# Patient Record
Sex: FEMALE | Race: WHITE | NOT HISPANIC OR LATINO | Employment: FULL TIME | URBAN - METROPOLITAN AREA
[De-identification: names, ages, dates, MRNs, and addresses within clinical notes are randomized per-mention and may not be internally consistent; named-entity substitution may affect disease eponyms.]

---

## 2021-03-24 LAB
EXTERNAL ABO GROUPING: NORMAL
EXTERNAL ANTIBODY SCREEN: NORMAL
EXTERNAL HEMATOCRIT: 38.2 %
EXTERNAL HEMOGLOBIN: 12.8 G/DL
EXTERNAL HEPATITIS B SURFACE ANTIGEN: NEGATIVE
EXTERNAL HIV-1/2 AB-AG: NORMAL
EXTERNAL PLATELET COUNT: 271 K/ΜL
EXTERNAL RH FACTOR: POSITIVE
EXTERNAL RUBELLA IGG QUANTITATION: NORMAL
EXTERNAL SYPHILIS RPR SCREEN: NORMAL

## 2021-05-24 LAB — EXTERNAL AFP: NORMAL

## 2021-07-22 LAB
EXTERNAL HEMATOCRIT: 34.4 %
EXTERNAL HEMOGLOBIN: 11.2 G/DL
EXTERNAL PLATELET COUNT: 212 K/ΜL
EXTERNAL SYPHILIS RPR SCREEN: NORMAL

## 2021-08-27 ENCOUNTER — INITIAL PRENATAL (OUTPATIENT)
Dept: OBGYN CLINIC | Facility: CLINIC | Age: 21
End: 2021-08-27
Payer: COMMERCIAL

## 2021-08-27 ENCOUNTER — TELEPHONE (OUTPATIENT)
Dept: OBGYN CLINIC | Facility: CLINIC | Age: 21
End: 2021-08-27

## 2021-08-27 VITALS — SYSTOLIC BLOOD PRESSURE: 125 MMHG | DIASTOLIC BLOOD PRESSURE: 70 MMHG | WEIGHT: 198 LBS

## 2021-08-27 DIAGNOSIS — Z34.03 ENCOUNTER FOR SUPERVISION OF NORMAL FIRST PREGNANCY IN THIRD TRIMESTER: ICD-10-CM

## 2021-08-27 DIAGNOSIS — O99.210 MATERNAL OBESITY, ANTEPARTUM: ICD-10-CM

## 2021-08-27 DIAGNOSIS — Z34.03 ENCOUNTER FOR SUPERVISION OF NORMAL FIRST PREGNANCY IN THIRD TRIMESTER: Primary | ICD-10-CM

## 2021-08-27 DIAGNOSIS — Z3A.33 33 WEEKS GESTATION OF PREGNANCY: ICD-10-CM

## 2021-08-27 DIAGNOSIS — U07.1 COVID-19 AFFECTING PREGNANCY IN SECOND TRIMESTER: Primary | ICD-10-CM

## 2021-08-27 DIAGNOSIS — O98.512 COVID-19 AFFECTING PREGNANCY IN SECOND TRIMESTER: ICD-10-CM

## 2021-08-27 DIAGNOSIS — U07.1 COVID-19 AFFECTING PREGNANCY IN SECOND TRIMESTER: ICD-10-CM

## 2021-08-27 DIAGNOSIS — O98.512 COVID-19 AFFECTING PREGNANCY IN SECOND TRIMESTER: Primary | ICD-10-CM

## 2021-08-27 PROBLEM — Z34.00 SUPERVISION OF NORMAL FIRST PREGNANCY: Status: ACTIVE | Noted: 2021-08-27

## 2021-08-27 LAB
SL AMB  POCT GLUCOSE, UA: NEGATIVE
SL AMB POCT URINE PROTEIN: NEGATIVE

## 2021-08-27 PROCEDURE — OBC: Performed by: CLINICAL NURSE SPECIALIST

## 2021-08-27 PROCEDURE — PNV: Performed by: CLINICAL NURSE SPECIALIST

## 2021-08-27 PROCEDURE — 81002 URINALYSIS NONAUTO W/O SCOPE: CPT | Performed by: CLINICAL NURSE SPECIALIST

## 2021-08-27 NOTE — PROGRESS NOTES
Prenatal Visit  Subjective:   Anibal Moreno is a 23 y o  female  She is a No obstetric history on file  here for initial PN visit/New OB exam  New ob transfer  Up to date on prenatal care  Records in care everywhere    She is reporting the following pregnancy related complaints:   Mild b/l lower abd/pelvic discomfort  Reports related to babies position and comes and goes   Denies ctx other than occasional BH type ctx   Denies LOF, VB or unusual d/c   Reports very active fetus    Just completed OB intake today- see other visit same day  Past history review including family genetic history reveal the following risk factors:  ·  COVID-19 affecting pregnancy in second trimester    ·  Maternal obesity, antepartum         Objective:  Patient's last menstrual period was 01/10/2021  LMP 01/10/2021   Pregravid Weight/BMI: 77 1 kg (170 lb) (BMI 30 12)      OBGyn Exam- see prenatal physical form    Assessment & Plan:       1  Encounter for supervision of normal first pregnancy in third trimester  Assessment & Plan:  She is a  who is 32w5d   Transfer of care due to relocation  We are able to see most prenatal records in Care everywhere and she is up to date    She completed both her initial prenatal blood work as well as her 3rd trimester blood work  Opted for alternate glucose testing for gestational diabetes by doing 2 weeks of glucose monitoring through [address]  Those records were requested and are not in the chart    She declined optional genetic screening but did have AFP which was normal  Level 2 ultrasound normal   Does not know gender is planning surprise for delivery   she declined flu vaccine as well as Tdap vaccine   pregnancy has been complicated by COVID infection in the 2nd trimester when she was 16 weeks pregnant    She has recovered and denies any symptoms    A follow-up growth scan was ordered through our Maternal-Fetal Medicine Department fundal height is measuring slightly low at 31 cm and she is 32 weeks 5 days  She was oriented to practice and reviewed the expected course of the remainder of the pregnancy  She is planning to breastfeed      labor and preeclampsia precautions were reviewed and educational handout given -see AVS     return to office in 2 weeks for routine prenatal visit        2  COVID-19 affecting pregnancy in second trimester  Assessment & Plan:  +Covid hx around 16 wks  Recovered   Denies lingering sxs  Growth US ordered      3  Maternal obesity, antepartum  Assessment & Plan:   Pre gestational BMI is 30  Was not started on low-dose aspirin by previous OB gyn and at this point would not be recommended since  She is > 28 weeks  Normal early hemoglobin A1c  she declined Glucola  In 3rd trimester and opted for alternate testing with 2 weeks of blood glucose monitoring through [facility]  We do not have those records as of yet and they were requested today  Patient reports testing within normal and not diagnosed with gestational diabetes      4  33 weeks gestation of pregnancy  -     POCT urine dip  -     Ambulatory Referral to Maternal Fetal Medicine;  Future; Expected date: 2021        CODI Louise  2021

## 2021-08-27 NOTE — ASSESSMENT & PLAN NOTE
She is a  who is 32w5d   Transfer of care due to relocation  We are able to see most prenatal records in Care everywhere and she is up to date    She completed both her initial prenatal blood work as well as her 3rd trimester blood work  Opted for alternate glucose testing for gestational diabetes by doing 2 weeks of glucose monitoring through [address]  Those records were requested and are not in the chart    She declined optional genetic screening but did have AFP which was normal  Level 2 ultrasound normal   Does not know gender is planning surprise for delivery   she declined flu vaccine as well as Tdap vaccine   pregnancy has been complicated by COVID infection in the 2nd trimester when she was 16 weeks pregnant  She has recovered and denies any symptoms    A follow-up growth scan was ordered through our Maternal-Fetal Medicine Department fundal height is measuring slightly low at 31 cm and she is 32 weeks 5 days  She was oriented to practice and reviewed the expected course of the remainder of the pregnancy       She is planning to breastfeed      labor and preeclampsia precautions were reviewed and educational handout given -see AVS     return to office in 2 weeks for routine prenatal visit

## 2021-08-27 NOTE — ASSESSMENT & PLAN NOTE
Pre gestational BMI is 30  Was not started on low-dose aspirin by previous OB gyn and at this point would not be recommended since  She is > 28 weeks  Normal early hemoglobin A1c  she declined Glucola  In 3rd trimester and opted for alternate testing with 2 weeks of blood glucose monitoring through [facility]  We do not have those records as of yet and they were requested today    Patient reports testing within normal and not diagnosed with gestational diabetes

## 2021-08-27 NOTE — PROGRESS NOTES
Subjective  Patient ID: Shelle Halsted is a 23 y o  female here for OB intake  She is accompanied by: self  Transfer to practice  Previous prenatal care at [facility]  Pregnancy was unplanned/surpised  Does not know Gender    Her Patient's last menstrual period was 01/10/2021  giving her an MARQUEZ of 10/17/21  She reports her menstrual cycle is regular    She is up to date on her Prenatal care  PNBW up to date  Declined optional genetic screening  MSAFP nml  Did alternate GDM testing- nml per pt - records requested  nml NT and Level 2 US  Declined Flu and tdap    Obstetric history reviewed/updated:  OB History    Para Term  AB Living   1 0 0 0 0 0   SAB TAB Ectopic Multiple Live Births   0 0 0 0 0      # Outcome Date GA Lbr Parth/2nd Weight Sex Delivery Anes PTL Lv   1 Current                 Previous Pregnancy Complications/Hx: n/a- G1    The following portions of the patient's history were reviewed and updated as appropriate: allergies, current medications, past family history, past medical history, past social history, past surgical history, and problem list     Denies personal hx of thyroid disorder, DM/Pre-diabetes, PCOS, metformin use, CHTN  Negative family hx of DM (T1 or T2), Pre-eclampsia/eclampsia, thyroid disorder  Family genetic history completed: unremarkable     Pre-Pregnancy weight: 77 1 kg (170 lb)  Pre-gravid BMI: 30 12  Infection/Exposure Risk assessment  Employed: Yes  Occupation: graduate admissions counselors    Infection Screening    Prior hx of MRSA?  no  Recent COVID Infection or exposure:   Yes Covid at 16 wks       Immunizations:   Vaccinated against Hep B: yes   Previous VZV vaccine or chicken pox disease   Yes + vaccine   influenza vaccine given this flu season: no    Discussed Tdap vaccine administration at 27-28 weeks  o Declines    Substance and Domestic Abuse Screening  None             Depression screening     PHQ-A Depression Screening ____________________________________________             /70   Wt 89 8 kg (198 lb)   LMP 01/10/2021   PE N/A-see other visit same day          Assessment/Plan:     OB intake completed  She is a  at [address]  Transfer in the 3rd trimester  She is up to date on her Prenatal care  · PNBW up to date  · Declined optional genetic screening  · MSAFP nml  · Did alternate GDM testing- nml per pt - records requested  · nml NT and Level 2 US  · Declined Flu and tdap    Oriented to practice and on call schedule  I reviewed risk factors for pregnancy based on her medical history     Diabetes risk Factors: The following hx was assessed r/t risk for diabetes in pregnancy: Pregestational DM, hx of GDM, BMI >35, 1st degree relative w/ T2 DM, personal hx of PCOS, Metformin use, Prior baby LGA/macrosomia  Pertinent positive: obesity     Hypertension  The following hx was assessed r/t risk for HTN disorder in pregnancy:   (Risk level High) prior hx of CHTN, gHTN, Pre-eclampsia (or eclampsia or HELLP syndrome), FH  pre-eclampsia, Multifetal gestation, Type 1 or Type 2 DM, renal disease, Autoimmune disease, Nulliparity;   (Risk level Mod) BMI > 30, > age 28, > 10 yr pregnancy interval, Previous IUGR/SGA baby, race  Pertinent positive: obesity & nulliparity    Prenatal lab work reviewed    Pregnancy Education  [facility]  [place] P[facility] Ess[facility] Book reviewed and discussed  Call group and instructions to call the office/answering service in case of an emergency  Discussed appropriate weight gain in pregnancy based on pre-gravid BMI    Discussed healthy lifestyle choices for pregnancy     OB packet/Handouts given addressing   Nutrition, Immunizations, and Medications during pregnancy   Warning Signs During Pregnancy   Baby and Me phone [facility]  [address] [place] [address] and Parenting Classes   [address] and Ultrasounds in Pregnancy    CoronaVirus and [place] precautions discussed and encouraged patient to visit CDC website for up-to-date information  Warning signs reviewed as well as reasons to call

## 2021-08-27 NOTE — TELEPHONE ENCOUNTER
Fax send to [facility] maternal fetal medicine #[phone] for records of glucose monitoring  Phone #[phone]

## 2021-08-27 NOTE — PATIENT INSTRUCTIONS
LABOR PRECAUTIONS   Call if having > 4-6 contractions in an hour (average occurring every 10-15 minutes)   Feeling of significant pelvic, lower abdomen, or vaginal pressure   Lower back pain that comes and goes   Any vaginal bleeding or spotting   Watery or bloody vaginal discharge       Labor   WHAT YOU NEED TO KNOW:   What is  labor?  (premature) labor occurs when the uterus contracts and your cervix opens earlier than normal  The cervix is the opening of your uterus   labor happens after the 20th week of pregnancy but before the 37th week  You may have premature rupture of membranes (PROM)  PROM means your water broke before labor began  An early labor could cause you to have your baby before he or she is ready to be born  What causes  labor? The cause is sometimes unknown  The following may cause early labor:  · A large uterus or a short cervix  can cause you to go into labor early  · A chronic illness , such as high blood pressure, diabetes, or obesity, can cause early labor  · An infection , such as a urinary tract infection or vaginal infection, can weaken the membranes (linings) of the amniotic sac around your baby  This could lead to premature rupture of the membranes and  labor  · Problems with the placenta , such as placenta previa or placental separation, may cause  labor  · Injury to your abdomen or uterus  may also cause some cases of  labor  What increases my risk for  labor? · You are pregnant with 2 or more babies  · You are under 16 or over 28years of age  · You get pregnant again less than 6 months after delivery  · You had a  labor or  birth in the past   · You did not have prenatal care  · You smoke, drink alcohol, or use street drugs while pregnant  · You are underweight  Too little weight gain during pregnancy may also increase your risk for early labor      What are the signs and symptoms of  labor? You may not know that you are having  labor  It is common to have  contractions (tightening and relaxing of the uterus) and not notice them  The following are signs and symptoms that suggest a  labor:  · Contractions that get stronger and closer together  · Changes in vaginal discharge, such as more discharge or discharge that is watery or bloody   · Low back pain   · Pressure in the lower abdomen   · Vaginal spotting or bleeding    How is  labor treated? Early treatment may delay delivery  You may need any of the following:  · Bed rest  may be recommended  You may need to lie on your left side, which improves circulation to your uterus and baby  Your healthcare provider will tell you when it is okay to get out of bed  · Medicine  may be given to stop contractions if your baby is not ready to be born  You may also need certain medicines if your  labor cannot be stopped and your healthcare provider thinks you will have your baby early  These medicines help your baby's lungs, brain, and digestive organs mature  They also help decrease your baby's risk of being born with cerebral palsy  If you have PROM, fluid from your vagina or rectum will be checked for a strep infection  You may be given antibiotics to prevent a strep infection during delivery  Antibiotics may also be used to prevent labor from starting  You may also need steroids to decrease the risk for complications due to  labor  Call your local emergency number (911 in the [address] [place] [address],[address]) if:   · You see or feel like there is something in your vagina  When should I call my doctor? · You have bright red, painless vaginal bleeding  · Your symptoms do not get better or they get worse  · Your water broke or you feel warm water gushing or trickling from your vagina  · You have contractions that get stronger and closer together for more than 1 hour     · You notice a decrease in your baby's movement  · You have abdominal cramps, pressure, or tightening  · You have a change in vaginal discharge  · You have a fever  · You have burning when you urinate or you are urinating less than is normal for you  · You have questions or concerns about your condition or care  CARE AGREEMENT:   You have the right to help plan your care  Learn about your health condition and how it may be treated  Discuss treatment options with your healthcare providers to decide what care you want to receive  You always have the right to refuse treatment  The above information is an  only  It is not intended as medical advice for individual conditions or treatments  Talk to your doctor, nurse or pharmacist before following any medical regimen to see if it is safe and effective for you  © Copyright [address] Information is for End User's use only and may not be sold, redistributed or otherwise used for commercial purposes  All illustrations and images included in CareNotes® are the copyrighted property of [employer] D A M [employer] Inc  or [employer]     Pt currently denies HA, vision changes or RUQ pain  No significant swelling noted  BP:  Urine : Wt:     Signs and symptoms of Pre-eclampsia - a disorder in pregnancy involving elevated Blood pressure in pregnancy  Please call immediately with any of the following:   Severe headache that does not improve with tylenol/rest/increased fluids   Vision disturances such as blurry vision, white spots or flashing lights in vision   Abdominal pain in the right upper quadrant- unrelated to fetal movement   Weight gain > 2-3 pounds in one week   Severe swelling- particularly of the hands or face

## 2021-09-01 ENCOUNTER — OB ABSTRACT (OUTPATIENT)
Dept: OBGYN CLINIC | Facility: CLINIC | Age: 21
End: 2021-09-01

## 2021-09-10 ENCOUNTER — ROUTINE PRENATAL (OUTPATIENT)
Dept: OBGYN CLINIC | Facility: CLINIC | Age: 21
End: 2021-09-10

## 2021-09-10 VITALS — SYSTOLIC BLOOD PRESSURE: 116 MMHG | DIASTOLIC BLOOD PRESSURE: 80 MMHG | WEIGHT: 201.4 LBS | HEART RATE: 81 BPM

## 2021-09-10 DIAGNOSIS — Z34.03 ENCOUNTER FOR SUPERVISION OF NORMAL FIRST PREGNANCY IN THIRD TRIMESTER: ICD-10-CM

## 2021-09-10 DIAGNOSIS — O99.213 OBESITY AFFECTING PREGNANCY, ANTEPARTUM, THIRD TRIMESTER: Primary | ICD-10-CM

## 2021-09-10 DIAGNOSIS — Z3A.34 34 WEEKS GESTATION OF PREGNANCY: ICD-10-CM

## 2021-09-10 LAB
SL AMB  POCT GLUCOSE, UA: ABNORMAL
SL AMB POCT URINE PROTEIN: ABNORMAL

## 2021-09-10 PROCEDURE — PNV: Performed by: OBSTETRICS & GYNECOLOGY

## 2021-09-10 NOTE — PROGRESS NOTES
S: 23 y o   34w5d here for routine prenatal visit  Chief Complaint   Patient presents with    Routine Prenatal Visit     patient c/o lower abdominal pain, patient denies any vaginal bleeding,LOF         OB complaints:  Contractions: no  Leakage: no  Bleeding: no  Fetal movement: yes  Some lower pelvic pressure/pain with certain movements and  position changes  Denies cramping/CTX type pain  O:  /80 (BP Location: Left arm, Patient Position: Sitting, Cuff Size: Standard)   Pulse 81   Wt 91 4 kg (201 lb 6 4 oz)   LMP 01/10/2021       Review of Systems   Constitutional: Negative for chills and fever  Eyes: Negative for visual disturbance  Respiratory: Negative for chest tightness and shortness of breath  Cardiovascular: Negative for chest pain  Gastrointestinal: Negative for abdominal pain, diarrhea, nausea and vomiting  Genitourinary: Positive for pelvic pain  Negative for vaginal bleeding  As noted in HPI   Skin: Negative for rash  Neurological: Negative for headaches  All other systems reviewed and are negative  Physical Exam  Constitutional:       General: She is not in acute distress  Appearance: Normal appearance  HENT:      Head: Normocephalic and atraumatic  Cardiovascular:      Rate and Rhythm: Normal rate  Pulmonary:      Effort: Pulmonary effort is normal  No respiratory distress  Abdominal:      General: There is no distension  Palpations: Abdomen is soft  Tenderness: There is no abdominal tenderness  There is no guarding or rebound  Comments: gravid   Neurological:      General: No focal deficit present  Mental Status: She is alert  Psychiatric:         Mood and Affect: Mood normal          Behavior: Behavior normal    Vitals and nursing note reviewed             Fundal Height (cm): 34 cm  Fetal Heart Rate: 140          Problem List Items Addressed This Visit        Other    Encounter for supervision of normal first pregnancy in third trimester     Normal discomforts of pregnancy reviewed  Labor consents and patient education materials provided today, can return next week   Again declines flu and tdap vaccines  GBS next visit   OB, COVID precautions reviewed         Obesity affecting pregnancy, antepartum, third trimester - Primary     Growth US scheduled for next week          34 weeks gestation of pregnancy    Relevant Orders    POCT urine dip (Completed)                          Chacho Hylton MD  9/10/2021  8:27 AM

## 2021-09-10 NOTE — PATIENT INSTRUCTIONS
Pregnancy at 28 to 38 Weeks   AMBULATORY CARE:   What changes are happening to your body: You are considered full term at the beginning of 37 weeks  Your breathing may be easier if your baby has moved down into a head-down position  You may need to urinate more often because the baby may be pressing on your bladder  You may also feel more discomfort and get tired easily  Seek care immediately if:   · You develop a severe headache that does not go away  · You have new or increased vision changes, such as blurred or spotted vision  · You have new or increased swelling in your face or hands  · You have vaginal spotting or bleeding  · Your water broke or you feel warm water gushing or trickling from your vagina  Contact your healthcare provider if:   · You have more than 5 contractions in 1 hour  · You notice any changes in your baby's movements  · You have abdominal cramps, pressure, or tightening  · You have a change in vaginal discharge  · You have chills or a fever  · You have vaginal itching, burning, or pain  · You have yellow, green, white, or foul-smelling vaginal discharge  · You have pain or burning when you urinate, less urine than usual, or pink or bloody urine  · You have questions or concerns about your condition or care  How to care for yourself at this stage of your pregnancy:   · Eat a variety of healthy foods  Healthy foods include fruits, vegetables, whole-grain breads, low-fat dairy foods, beans, lean meats, and fish  Drink liquids as directed  Ask how much liquid to drink each day and which liquids are best for you  Limit caffeine to less than 200 milligrams each day  Limit your intake of fish to 2 servings each week  Choose fish low in mercury such as canned light tuna, shrimp, salmon, cod, or tilapia  Do not  eat fish high in mercury such as swordfish, tilefish, lyndsay mackerel, and shark  · Take prenatal vitamins as directed    Your need for certain vitamins and minerals, such as folic acid, increases during pregnancy  Prenatal vitamins provide some of the extra vitamins and minerals you need  Prenatal vitamins may also help to decrease the risk of certain birth defects  · Rest as needed  Put your feet up if you have swelling in your ankles and feet  · Do not smoke  Smoking increases your risk of a miscarriage and other health problems during your pregnancy  Smoking can cause your baby to be born early or weigh less at birth  Ask your healthcare provider for information if you need help quitting  · Do not drink alcohol  Alcohol passes from your body to your baby through the placenta  It can affect your baby's brain development and cause fetal alcohol syndrome (FAS)  FAS is a group of conditions that causes mental, behavior, and growth problems  · Talk to your healthcare provider before you take any medicines  Many medicines may harm your baby if you take them when you are pregnant  Do not take any medicines, vitamins, herbs, or supplements without first talking to your healthcare provider  Never use illegal or street drugs (such as marijuana or cocaine) while you are pregnant  · Talk to your healthcare provider before you travel  You may not be able to travel in an airplane after 36 weeks  He may also recommend that you avoid long road trips  Safety tips during pregnancy:   · Avoid hot tubs and saunas  Do not use a hot tub or sauna while you are pregnant, especially during your first trimester  Hot tubs and saunas may raise your baby's temperature and increase the risk of birth defects  · Avoid toxoplasmosis  This is an infection caused by eating raw meat or being around infected cat feces  It can cause birth defects, miscarriages, and other problems  Wash your hands after you touch raw meat  Make sure any meat is well-cooked before you eat it  Avoid raw eggs and unpasteurized milk   Use gloves or ask someone else to clean your cat's litter box while you are pregnant  · Ask your healthcare provider about travel  The most comfortable time to travel is during the second trimester  Ask your healthcare provider if you can travel after 36 weeks  You may not be able to travel in an airplane after 36 weeks  He may also recommend that you avoid long road trips  Changes that are happening with your baby:  By 38 weeks, your baby may weigh between 6 and 9 pounds  Your baby may be about 14 inches long from the top of the head to the rump (baby's bottom)  Your baby hears well enough to know your voice  As your baby gets larger, you may feel fewer kicks and more stretching and rolling  Your baby may move into a head-down position  Your baby will also rest lower in your abdomen  What you need to know about prenatal care: Your healthcare provider will check your blood pressure and weight  You may also need the following:  · A urine test  may also be done to check for sugar and protein  These can be signs of gestational diabetes or infection  Protein in your urine may also be a sign of preeclampsia  Preeclampsia is a condition that can develop during week 20 or later of your pregnancy  It causes high blood pressure, and it can cause problems with your kidneys and other organs  · A blood test  may be done to check for anemia (low iron level)  · A Tdap vaccine  may be recommended by your healthcare provider  · A group B strep test  is a test that is done to check for group B strep infection  Group B strep is a type of bacteria that may be found in the vagina or rectum  It can be passed to your baby during delivery if you have it  Your healthcare provider will take swab your vagina or rectum and send the sample to the lab for tests  · Fundal height  is a measurement of your uterus to check your baby's growth  This number is usually the same as the number of weeks that you have been pregnant   Your healthcare provider may also check your baby's position  · Your baby's heart rate  will be checked  © Copyright [employer] 2021 Information is for End User's use only and may not be sold, redistributed or otherwise used for commercial purposes  All illustrations and images included in CareNotes® are the copyrighted property of A D A M , Inc  or Ketan Francois  The above information is an  only  It is not intended as medical advice for individual conditions or treatments  Talk to your doctor, nurse or pharmacist before following any medical regimen to see if it is safe and effective for you

## 2021-09-10 NOTE — ASSESSMENT & PLAN NOTE
Normal discomforts of pregnancy reviewed  Labor consents and patient education materials provided today, can return next week   Again declines flu and tdap vaccines  GBS next visit   OB, COVID precautions reviewed

## 2021-09-15 NOTE — PROGRESS NOTES
Please refer to the [facility] ultrasound report in Ob Procedures for additional information regarding today's visit

## 2021-09-16 ENCOUNTER — ROUTINE PRENATAL (OUTPATIENT)
Dept: PERINATAL CARE | Facility: OTHER | Age: 21
End: 2021-09-16
Payer: COMMERCIAL

## 2021-09-16 VITALS
HEIGHT: 63 IN | SYSTOLIC BLOOD PRESSURE: 122 MMHG | BODY MASS INDEX: 35.86 KG/M2 | WEIGHT: 202.4 LBS | HEART RATE: 105 BPM | DIASTOLIC BLOOD PRESSURE: 84 MMHG

## 2021-09-16 DIAGNOSIS — Z3A.33 33 WEEKS GESTATION OF PREGNANCY: ICD-10-CM

## 2021-09-16 DIAGNOSIS — Z36.3 ENCOUNTER FOR ANTENATAL SCREENING FOR MALFORMATIONS: ICD-10-CM

## 2021-09-16 DIAGNOSIS — Z3A.35 35 WEEKS GESTATION OF PREGNANCY: ICD-10-CM

## 2021-09-16 DIAGNOSIS — O43.193 MARGINAL INSERTION OF UMBILICAL CORD AFFECTING MANAGEMENT OF MOTHER IN THIRD TRIMESTER: ICD-10-CM

## 2021-09-16 DIAGNOSIS — O36.5930 INTRAUTERINE GROWTH RESTRICTION AFFECTING ANTEPARTUM CARE OF MOTHER IN THIRD TRIMESTER, SINGLE OR UNSPECIFIED FETUS: Primary | ICD-10-CM

## 2021-09-16 PROCEDURE — 76820 UMBILICAL ARTERY ECHO: CPT | Performed by: OBSTETRICS & GYNECOLOGY

## 2021-09-16 PROCEDURE — 59025 FETAL NON-STRESS TEST: CPT | Performed by: OBSTETRICS & GYNECOLOGY

## 2021-09-16 PROCEDURE — 99203 OFFICE O/P NEW LOW 30 MIN: CPT | Performed by: OBSTETRICS & GYNECOLOGY

## 2021-09-16 PROCEDURE — 76811 OB US DETAILED SNGL FETUS: CPT | Performed by: OBSTETRICS & GYNECOLOGY

## 2021-09-16 NOTE — LETTER
September 16, 2021     Refugio Carbajal, [address] [place] [id number] [facility]    Patient: Marilyn Bui   YOB: 2000   Date of Visit: 9/16/2021       Dear Dr Nydia Watson: Thank you for referring Marilyn Bui to me for evaluation  Below are my notes for this consultation  If you have questions, please do not hesitate to call me  I look forward to following your patient along with you  Sincerely,        Alexa Solis MD        CC: No Recipients  Alexa Solis MD  9/15/2021  8:10 AM  Sign when Signing Visit   Please refer to the [facility] ultrasound report in Ob Procedures for additional information regarding today's visit

## 2021-09-16 NOTE — PROGRESS NOTES
NST procedure and expected outcome explained to patient  Daily fetal kick count discussed with handout given  Patient verbalized understanding of all and was receptive      Nagi Pineda RN

## 2021-09-16 NOTE — LETTER
NST sleeve cover sheet    Patient name: Zabrina Corral  : 2000  MRN: [id number]    MARQUEZ: Estimated Date of Delivery: 10/17/21    Obstetrician: _______________________________    Reason(s) for testing:  __________________________________________      Testing frequency:    ___ 2x/wk  ___ 1x/wk  ___ Dopplers  ___ BPP?       Last growth scan: __________________________________________

## 2021-09-16 NOTE — PATIENT INSTRUCTIONS
Kick Counts in Pregnancy   WHAT YOU NEED TO KNOW:   Kick counts measure how much your baby is moving in your womb  A kick from your baby can be felt as a twist, turn, swish, roll, or jab  It is common to feel your baby kicking at 26 to 28 weeks of pregnancy  You may feel your baby kick as early as 20 weeks of pregnancy  You may want to start counting at 28 weeks  DISCHARGE INSTRUCTIONS:   Contact your healthcare provider immediately if:   · You feel a change in the number of kicks or movements of your baby  · You feel fewer than 10 kicks within 2 hours  · You have questions or concerns about your baby's movements  Why measure kick counts:  Your baby's movement may provide information about your baby's health  He or she may move less, or not at all, if there are problems  Your baby may move less if he or she is not getting enough oxygen or nutrition from the placenta  Do not smoke while you are pregnant  Smoking decreases the amount of oxygen that gets to your baby  Talk to your healthcare provider if you need help to quit smoking  Tell your healthcare provider as soon as you feel a change in your baby's movements  When to measure kick counts:   · Measure kick counts at the same time every day  · Measure kick counts when your baby is awake and most active  Your baby may be most active in the evening  How to measure kick counts:  Check that your baby is awake before you measure kick counts  You can wake up your baby by lightly pushing on your belly, walking, or drinking something cold  Your healthcare provider may tell you different ways to measure kick counts  You may be told to do the following:  · Use a chart or clock to keep track of the time you start and finish counting  · Sit in a chair or lie on your left side  · Place your hands on the largest part of your belly  · Count until you reach 10 kicks  Write down how much time it takes to count 10 kicks       · It may take 30 minutes to 2 hours to count 10 kicks  It should not take more than 2 hours to count 10 kicks  Follow up with your healthcare provider as directed:  Write down your questions so you remember to ask them during your visits  © Copyright [employer] 2021 Information is for End User's use only and may not be sold, redistributed or otherwise used for commercial purposes  All illustrations and images included in CareNotes® are the copyrighted property of A D A M , Inc  or Ketan Crabtree   The above information is an  only  It is not intended as medical advice for individual conditions or treatments  Talk to your doctor, nurse or pharmacist before following any medical regimen to see if it is safe and effective for you  Nonstress Test for Pregnancy   WHAT YOU NEED TO KNOW:   What do I need to know about a nonstress test?  A nonstress test measures your baby's heart rate and movements  Nonstress means that no stress will be placed on your baby during the test   How do I prepare for a nonstress test?  Your healthcare provider will talk to you about how to prepare for this test  He or she may tell you to eat and drink plenty of fluids before your test  If you smoke, you may be asked not to smoke within 2 hours before the test  He or she will also tell you which medicines to take or not take on the day of your test   What will happen during a nonstress test?  You may be asked to lie down or recline back for the test on a bed  One or 2 belts with sensors will be placed around your abdomen  Your baby's heart rate will be recorded with a machine  If your baby does not move, your baby may be asleep  Your healthcare provider may make a noise near your abdomen to try to wake your baby  The test usually takes about 20 minutes, but can take longer if your baby needs to be awakened  What do I need to know about the test results?   Your baby will be expected to move at least 2 times for a certain amount of time  Your baby's heart rate will be expected to go up by a certain number of beats per minute during movement  If your baby does not move as expected, the test may need to be repeated or you may need other tests  CARE AGREEMENT:   You have the right to help plan your care  Learn about your health condition and how it may be treated  Discuss treatment options with your healthcare providers to decide what care you want to receive  You always have the right to refuse treatment  The above information is an  only  It is not intended as medical advice for individual conditions or treatments  Talk to your doctor, nurse or pharmacist before following any medical regimen to see if it is safe and effective for you  © Copyright [employer] 2021 Information is for End User's use only and may not be sold, redistributed or otherwise used for commercial purposes   All illustrations and images included in CareNotes® are the copyrighted property of [employer]  or [address] [place] [address] [employer]

## 2021-09-22 ENCOUNTER — ULTRASOUND (OUTPATIENT)
Dept: PERINATAL CARE | Facility: OTHER | Age: 21
End: 2021-09-22
Payer: COMMERCIAL

## 2021-09-22 ENCOUNTER — ROUTINE PRENATAL (OUTPATIENT)
Dept: OBGYN CLINIC | Facility: CLINIC | Age: 21
End: 2021-09-22

## 2021-09-22 VITALS — SYSTOLIC BLOOD PRESSURE: 124 MMHG | BODY MASS INDEX: 35.96 KG/M2 | DIASTOLIC BLOOD PRESSURE: 78 MMHG | WEIGHT: 203 LBS

## 2021-09-22 VITALS
WEIGHT: 203.2 LBS | HEART RATE: 110 BPM | DIASTOLIC BLOOD PRESSURE: 81 MMHG | SYSTOLIC BLOOD PRESSURE: 119 MMHG | HEIGHT: 63 IN | BODY MASS INDEX: 36 KG/M2

## 2021-09-22 DIAGNOSIS — Z34.03 ENCOUNTER FOR SUPERVISION OF NORMAL FIRST PREGNANCY IN THIRD TRIMESTER: ICD-10-CM

## 2021-09-22 DIAGNOSIS — Z3A.36 36 WEEKS GESTATION OF PREGNANCY: ICD-10-CM

## 2021-09-22 DIAGNOSIS — O98.512 COVID-19 AFFECTING PREGNANCY IN SECOND TRIMESTER: ICD-10-CM

## 2021-09-22 DIAGNOSIS — O36.5930 INTRAUTERINE GROWTH RESTRICTION AFFECTING ANTEPARTUM CARE OF MOTHER IN THIRD TRIMESTER, SINGLE OR UNSPECIFIED FETUS: Primary | ICD-10-CM

## 2021-09-22 DIAGNOSIS — U07.1 COVID-19 AFFECTING PREGNANCY IN SECOND TRIMESTER: ICD-10-CM

## 2021-09-22 DIAGNOSIS — Z11.3 SCREENING FOR STDS (SEXUALLY TRANSMITTED DISEASES): ICD-10-CM

## 2021-09-22 DIAGNOSIS — O99.213 OBESITY AFFECTING PREGNANCY, ANTEPARTUM, THIRD TRIMESTER: ICD-10-CM

## 2021-09-22 LAB
SL AMB  POCT GLUCOSE, UA: NORMAL
SL AMB POCT URINE PROTEIN: NORMAL

## 2021-09-22 PROCEDURE — 87591 N.GONORRHOEAE DNA AMP PROB: CPT | Performed by: CLINICAL NURSE SPECIALIST

## 2021-09-22 PROCEDURE — 76820 UMBILICAL ARTERY ECHO: CPT | Performed by: OBSTETRICS & GYNECOLOGY

## 2021-09-22 PROCEDURE — 87150 DNA/RNA AMPLIFIED PROBE: CPT | Performed by: CLINICAL NURSE SPECIALIST

## 2021-09-22 PROCEDURE — 59025 FETAL NON-STRESS TEST: CPT | Performed by: OBSTETRICS & GYNECOLOGY

## 2021-09-22 PROCEDURE — 87491 CHLMYD TRACH DNA AMP PROBE: CPT | Performed by: CLINICAL NURSE SPECIALIST

## 2021-09-22 PROCEDURE — 76815 OB US LIMITED FETUS(S): CPT | Performed by: OBSTETRICS & GYNECOLOGY

## 2021-09-22 PROCEDURE — PNV: Performed by: CLINICAL NURSE SPECIALIST

## 2021-09-22 NOTE — ASSESSMENT & PLAN NOTE
Growth US last week showed EFW 7%  Started on a/n surveillance Just had a/n surveillance this am at [facility] -pt reports normal  Report not available at time of appt  Weekly NST/MARIAM/dopplers and rpt growth next week  Reviewed delivery timing - if growth remains stable will likely allow delivery around 39 wks  If growth drops off more will deliver prior to38 wks

## 2021-09-22 NOTE — ASSESSMENT & PLAN NOTE
No wt gain in the past few weeks    Normal Alternate GDM screening  Fetal Growth abnormal- see other A&P

## 2021-09-22 NOTE — PROGRESS NOTES
Prenatal Visit  Subjective:   Christy Devi is a 23 y o   36w3d here for Routine Prenatal Visit    Denies unusual vaginal discharge, LOF, VB, or ctx  She does report some cramping she feels more in her back like she is getting menses  Reports Fetus is active  Due for GBS  She does have increased risks for STI due to age    Objective:  Vitals:    21 0854   BP: 124/78       Pregravid Weight/BMI: 77 1 kg (170 lb) (BMI 30 12)  Current Weight: 92 1 kg (203 lb)   Total Weight Gain: 15 kg (33 lb)     OBGyn Exam  Physical Exam  Fetal Heart Rate: 150 , Fundal Height (cm): 34 cm    General: Not in acute distress and appearing well nourished and well groomed  Genitourinary:          Pelvic exam pt declined internal pelvic exam today   Cardiovascular:   Rate and Rhythm: Normal rate  Pulmonary:    Effort: normal, not labored  Abdominal:  Abdomen is soft and gravid    Musculoskeletal: Active movement of all extremities, no gross limitations of ROM     Edema: None  Neurological:   Mental Status: She is alert and oriented to person, place, and time  Skin: General: Skin is warm and dry  Psychiatric:    Mood and Affect: Mood normal       Behavior: Behavior normal    Assessment & Plan:      1  Intrauterine growth restriction affecting antepartum care of mother in third trimester, single or unspecified fetus  Assessment & Plan:  Growth US last week showed EFW 7%  Started on a/n surveillance Just had a/n surveillance this am at [facility] -pt reports normal  Report not available at time of appt  Weekly NST/MARIAM/dopplers and rpt growth next week  Reviewed delivery timing - if growth remains stable will likely allow delivery around 39 wks  If growth drops off more will deliver prior to38 wks  2  Encounter for supervision of normal first pregnancy in third trimester  Assessment & Plan:    36w3d  No S/S Labor  GBS culture was collected at this visit   Rescreen for GT/CT was collected/ordered    We reviewed the following today:  · Labor: I have reviewed the signs and symptoms of labor with the patient, including contractions q4-5 minutes for greater than 1 hour, vaginal bleeding, leaking fluid and decreased fetal movement  I have emphasized the continued importance of paying close attention to the baby's movements  I have instructed the patient to call the office with any of the above symptoms prior to coming to the hospital    · Reviewed benefits of perineal massage - to be done 1-4 times per week x 5-10 minutes- education in AVS given  · Anesthesia: I have reviewed the options for anesthesia in labor, including IV medications in early labor, regional anesthesia with an epidural or spinal, local anesthesia or general anesthesia if needed for a   I have reviewed that the anesthesia staff will see every patient on the labor floor to be prepared in the event of an emergency  They will review the risks and benefits of each option and sign an anesthesia consent  · Breastfeeding on Demand: I have reviewed the benefits to both mom and baby of breastfeeding on demand as well as exclusive breastfeeding within the first 6 months of life  3  Obesity affecting pregnancy, antepartum, third trimester  Assessment & Plan:  No wt gain in the past few weeks  Normal Alternate GDM screening  Fetal Growth abnormal- see other A&P      4  COVID-19 affecting pregnancy in second trimester    5   Screening for STDs (sexually transmitted diseases)  -     Chlamydia/GC amplified DNA by PCR    6  36 weeks gestation of pregnancy  -     POCT urine dip  -     Strep B DNA probe, amplification      CODI Diehl  2021

## 2021-09-22 NOTE — ASSESSMENT & PLAN NOTE
36w3d  No S/S Labor  GBS culture was collected at this visit  Rescreen for GT/CT was collected/ordered    We reviewed the following today:  · Labor: I have reviewed the signs and symptoms of labor with the patient, including contractions q4-5 minutes for greater than 1 hour, vaginal bleeding, leaking fluid and decreased fetal movement  I have emphasized the continued importance of paying close attention to the baby's movements  I have instructed the patient to call the office with any of the above symptoms prior to coming to the hospital    · Reviewed benefits of perineal massage - to be done 1-4 times per week x 5-10 minutes- education in AVS given  · Anesthesia: I have reviewed the options for anesthesia in labor, including IV medications in early labor, regional anesthesia with an epidural or spinal, local anesthesia or general anesthesia if needed for a   I have reviewed that the anesthesia staff will see every patient on the labor floor to be prepared in the event of an emergency  They will review the risks and benefits of each option and sign an anesthesia consent  · Breastfeeding on Demand: I have reviewed the benefits to both mom and baby of breastfeeding on demand as well as exclusive breastfeeding within the first 6 months of life

## 2021-09-22 NOTE — PATIENT INSTRUCTIONS
Perineal Massage      Starting after 34 wks- massage vaginal perineum for   - use almond, coconut or olive oil and water mixture on 1-2 fingers  - insert fingers  into the vagina and apply sweeping downward/sideward pressure from 3-9 o'clock  - Do this for 5-10 minutes at a time; 1-4 times per week    WHY? To help decrease chances of tearing during delivery       How Will I Know if Im in Labor?  Abdominal contractions will be strong and regular   The contractions will be strong enough that it will be difficult to walk, talk or breathe through them   Your water breaks - may be a gush or a slow leak    o To differentiate between amniotic fluid or urine perform a kegel exercise  - If its urine, when performing a kegel, the flow of urine will stop  - If its amniotic fluid, when performing a kegel, the flow of fluid will not stop    For a first time mom, think of the 511 rule   Your contractions are 5 minutes apart   The contractions last 1 minutes each   The contractions have been in that pattern for 1 hour    For a mom who has been through the birth process before   Contractions that occur every at least every 5-8 minutes apart and are becoming progressively  more uncomfortable  Please always call the office prior to going to the hospital   Pt currently denies HA, vision changes or RUQ pain  No significant swelling noted  BP:  Urine : Wt:     Signs and symptoms of Pre-eclampsia - a disorder in pregnancy involving elevated Blood pressure in pregnancy  Please call immediately with any of the following:   Severe headache that does not improve with tylenol/rest/increased fluids   Vision disturances such as blurry vision, white spots or flashing lights in vision   Abdominal pain in the right upper quadrant- unrelated to fetal movement   Weight gain > 2-3 pounds in one week   Severe swelling- particularly of the hands or face      Pregnancy at 35 to 38 Weeks   AMBULATORY CARE:   What changes are happening to your body: You are considered full term at the beginning of 37 weeks  Your breathing may be easier if your baby has moved down into a head-down position  You may need to urinate more often because the baby may be pressing on your bladder  You may also feel more discomfort and get tired easily  Seek care immediately if:   · You develop a severe headache that does not go away  · You have new or increased vision changes, such as blurred or spotted vision  · You have new or increased swelling in your face or hands  · You have vaginal spotting or bleeding  · Your water broke or you feel warm water gushing or trickling from your vagina  Contact your healthcare provider if:   · You have more than 5 contractions in 1 hour  · You notice any changes in your baby's movements  · You have abdominal cramps, pressure, or tightening  · You have a change in vaginal discharge  · You have chills or a fever  · You have vaginal itching, burning, or pain  · You have yellow, green, white, or foul-smelling vaginal discharge  · You have pain or burning when you urinate, less urine than usual, or pink or bloody urine  · You have questions or concerns about your condition or care  How to care for yourself at this stage of your pregnancy:   · Eat a variety of healthy foods  Healthy foods include fruits, vegetables, whole-grain breads, low-fat dairy foods, beans, lean meats, and fish  Drink liquids as directed  Ask how much liquid to drink each day and which liquids are best for you  Limit caffeine to less than 200 milligrams each day  Limit your intake of fish to 2 servings each week  Choose fish low in mercury such as canned light tuna, shrimp, salmon, cod, or tilapia  Do not  eat fish high in mercury such as swordfish, tilefish, lyndsay mackerel, and shark  · Take prenatal vitamins as directed    Your need for certain vitamins and minerals, such as folic acid, increases during pregnancy  Prenatal vitamins provide some of the extra vitamins and minerals you need  Prenatal vitamins may also help to decrease the risk of certain birth defects  · Rest as needed  Put your feet up if you have swelling in your ankles and feet  · Do not smoke  Smoking increases your risk of a miscarriage and other health problems during your pregnancy  Smoking can cause your baby to be born early or weigh less at birth  Ask your healthcare provider for information if you need help quitting  · Do not drink alcohol  Alcohol passes from your body to your baby through the placenta  It can affect your baby's brain development and cause fetal alcohol syndrome (FAS)  FAS is a group of conditions that causes mental, behavior, and growth problems  · Talk to your healthcare provider before you take any medicines  Many medicines may harm your baby if you take them when you are pregnant  Do not take any medicines, vitamins, herbs, or supplements without first talking to your healthcare provider  Never use illegal or street drugs (such as marijuana or cocaine) while you are pregnant  · Talk to your healthcare provider before you travel  You may not be able to travel in an airplane after 36 weeks  He may also recommend that you avoid long road trips  Safety tips during pregnancy:   · Avoid hot tubs and saunas  Do not use a hot tub or sauna while you are pregnant, especially during your first trimester  Hot tubs and saunas may raise your baby's temperature and increase the risk of birth defects  · Avoid toxoplasmosis  This is an infection caused by eating raw meat or being around infected cat feces  It can cause birth defects, miscarriages, and other problems  Wash your hands after you touch raw meat  Make sure any meat is well-cooked before you eat it  Avoid raw eggs and unpasteurized milk  Use gloves or ask someone else to clean your cat's litter box while you are pregnant       · Ask your healthcare provider about travel  The most comfortable time to travel is during the second trimester  Ask your healthcare provider if you can travel after 36 weeks  You may not be able to travel in an airplane after 36 weeks  He may also recommend that you avoid long road trips  Changes that are happening with your baby:  By 38 weeks, your baby may weigh between 6 and 9 pounds  Your baby may be about 14 inches long from the top of the head to the rump (baby's bottom)  Your baby hears well enough to know your voice  As your baby gets larger, you may feel fewer kicks and more stretching and rolling  Your baby may move into a head-down position  Your baby will also rest lower in your abdomen  What you need to know about prenatal care: Your healthcare provider will check your blood pressure and weight  You may also need the following:  · A urine test  may also be done to check for sugar and protein  These can be signs of gestational diabetes or infection  Protein in your urine may also be a sign of preeclampsia  Preeclampsia is a condition that can develop during week 20 or later of your pregnancy  It causes high blood pressure, and it can cause problems with your kidneys and other organs  · A blood test  may be done to check for anemia (low iron level)  · A Tdap vaccine  may be recommended by your healthcare provider  · A group B strep test  is a test that is done to check for group B strep infection  Group B strep is a type of bacteria that may be found in the vagina or rectum  It can be passed to your baby during delivery if you have it  Your healthcare provider will take swab your vagina or rectum and send the sample to the lab for tests  · Fundal height  is a measurement of your uterus to check your baby's growth  This number is usually the same as the number of weeks that you have been pregnant  Your healthcare provider may also check your baby's position       · Your baby's heart rate  will be checked  © Copyright [employer] 2021 Information is for End User's use only and may not be sold, redistributed or otherwise used for commercial purposes  All illustrations and images included in CareNotes® are the copyrighted property of A [employer] M [employer] Inc  or Ketan Francois  The above information is an  only  It is not intended as medical advice for individual conditions or treatments  Talk to your doctor, nurse or pharmacist before following any medical regimen to see if it is safe and effective for you

## 2021-09-23 LAB
C TRACH DNA SPEC QL NAA+PROBE: NEGATIVE
N GONORRHOEA DNA SPEC QL NAA+PROBE: NEGATIVE

## 2021-09-25 LAB — GP B STREP DNA SPEC QL NAA+PROBE: POSITIVE

## 2021-09-28 ENCOUNTER — ROUTINE PRENATAL (OUTPATIENT)
Dept: OBGYN CLINIC | Facility: CLINIC | Age: 21
End: 2021-09-28

## 2021-09-28 ENCOUNTER — ROUTINE PRENATAL (OUTPATIENT)
Dept: PERINATAL CARE | Facility: OTHER | Age: 21
End: 2021-09-28
Payer: COMMERCIAL

## 2021-09-28 ENCOUNTER — ULTRASOUND (OUTPATIENT)
Dept: PERINATAL CARE | Facility: OTHER | Age: 21
End: 2021-09-28
Payer: COMMERCIAL

## 2021-09-28 VITALS
HEART RATE: 91 BPM | WEIGHT: 203.4 LBS | DIASTOLIC BLOOD PRESSURE: 82 MMHG | SYSTOLIC BLOOD PRESSURE: 124 MMHG | BODY MASS INDEX: 36.04 KG/M2 | HEIGHT: 63 IN

## 2021-09-28 VITALS
BODY MASS INDEX: 36.77 KG/M2 | HEART RATE: 103 BPM | SYSTOLIC BLOOD PRESSURE: 110 MMHG | TEMPERATURE: 97.7 F | WEIGHT: 207.6 LBS | DIASTOLIC BLOOD PRESSURE: 80 MMHG

## 2021-09-28 DIAGNOSIS — O99.213 OBESITY AFFECTING PREGNANCY, ANTEPARTUM, THIRD TRIMESTER: ICD-10-CM

## 2021-09-28 DIAGNOSIS — O98.512 COVID-19 AFFECTING PREGNANCY IN SECOND TRIMESTER: ICD-10-CM

## 2021-09-28 DIAGNOSIS — U07.1 COVID-19 AFFECTING PREGNANCY IN SECOND TRIMESTER: ICD-10-CM

## 2021-09-28 DIAGNOSIS — Z3A.37 37 WEEKS GESTATION OF PREGNANCY: ICD-10-CM

## 2021-09-28 DIAGNOSIS — O36.5930 INTRAUTERINE GROWTH RESTRICTION AFFECTING ANTEPARTUM CARE OF MOTHER IN THIRD TRIMESTER, SINGLE OR UNSPECIFIED FETUS: Primary | ICD-10-CM

## 2021-09-28 DIAGNOSIS — Z3A.37 37 WEEKS GESTATION OF PREGNANCY: Primary | ICD-10-CM

## 2021-09-28 DIAGNOSIS — Z34.03 ENCOUNTER FOR SUPERVISION OF NORMAL FIRST PREGNANCY IN THIRD TRIMESTER: ICD-10-CM

## 2021-09-28 LAB
SL AMB  POCT GLUCOSE, UA: NEGATIVE
SL AMB POCT URINE PROTEIN: NEGATIVE

## 2021-09-28 PROCEDURE — 76820 UMBILICAL ARTERY ECHO: CPT | Performed by: OBSTETRICS & GYNECOLOGY

## 2021-09-28 PROCEDURE — 59025 FETAL NON-STRESS TEST: CPT | Performed by: OBSTETRICS & GYNECOLOGY

## 2021-09-28 PROCEDURE — NC001 PR NO CHARGE: Performed by: OBSTETRICS & GYNECOLOGY

## 2021-09-28 PROCEDURE — PNV: Performed by: OBSTETRICS & GYNECOLOGY

## 2021-09-28 PROCEDURE — 76816 OB US FOLLOW-UP PER FETUS: CPT | Performed by: OBSTETRICS & GYNECOLOGY

## 2021-09-28 PROCEDURE — 99214 OFFICE O/P EST MOD 30 MIN: CPT | Performed by: OBSTETRICS & GYNECOLOGY

## 2021-09-28 NOTE — LETTER
September 28, 2021     Lana Stringer, [address] [place], [place] [address] [facility]    Patient: Lenore Hinojosa   YOB: 2000   Date of Visit: 9/28/2021       Dear Dr Suha Hansen: Thank you for referring Lenore Hinojosa to me for evaluation  Below are my notes for this consultation  If you have questions, please do not hesitate to call me  I look forward to following your patient along with you  Sincerely,        Deshaun Johnston MD        CC: No Recipients  Deshaun Johnston MD  9/28/2021  9:21 AM  Sign when Signing Visit  Lenore Hinojosa has no complaints today  She reports regular fetal movements and does not report any problems  She is here today at 37w2d for an ultrasound for  Fetal growth and Dopplers as her prior ultrasound 35 weeks shows the fetus was in the 7th percentile with normal Dopplers at that visit  This is her 1st pregnancy  She declined genetic screening  This pregnancy was complicated by a COVID infection and she declined Tdap and flu vaccine  Ultrasound findings: The ultrasound today shows a fetus that is symmetric and is in the 8th percentile  The fetus has grown 12 ounces in the last 12 days which is reassuring     NST is reactive  Pregnancy ultrasound has limitations and is unable to detect all forms of fetal congenital abnormalities  The inaccuracy in the EFW can be off by 1 lb either way in the third trimester  The prior missed anatomy was reviewed and no malformations are seen but we were unable to complete all of the missed anatomy secondary to late gestational age and fetal position  Follow up recommended:   1  Recommend twice weekly NSTs and weekly MARIAM and Dopplers till delivery  2  As per ACOG delivery can be offered between 38 weeks and 0 days to 39 weeks and 6 days  Based on what I see today I would recommend delivery after 39 weeks       Pre visit time reviewing her records    5 minutes  Face to face time  5 minutes  Post visit time on documentation of note, updating her problem list, adding orders and prescriptions  5 minutes  Procedures that were completed today were charged separately  The level of decision making was straightforward       Parag Valencia MD

## 2021-09-28 NOTE — PROGRESS NOTES
OB/GYN  PN Visit  Belen Lopes  [id number]  9/28/2021  3:07 PM  Dr Nina White MD    S: 23 y o  Augustus Brown 37w2d here for PN visit  Chief Complaint   Patient presents with    Routine Prenatal Visit     1 week OBV  Patient reports no concerns         OB complaints:  Contractions: no  Leakage: no  Bleeding: no  Fetal movement: yes      O:  /80 (BP Location: Right arm, Cuff Size: Standard)   Pulse 103   Temp 97 7 °F (36 5 °C) (Temporal)   Wt 94 2 kg (207 lb 9 6 oz)   LMP 01/10/2021   BMI 36 77 kg/m²       Review of Systems   Constitutional: Negative  HENT: Negative  Eyes: Negative  Respiratory: Negative  Cardiovascular: Negative  Gastrointestinal: Negative  Endocrine: Negative  Genitourinary:        As noted in HPI   Musculoskeletal: Negative  Skin: Negative  Allergic/Immunologic: Negative  Neurological: Negative  Hematological: Negative  Psychiatric/Behavioral: Negative  Physical Exam  Constitutional:       General: She is not in acute distress  Appearance: She is well-developed  Abdominal:      Palpations: Abdomen is soft  Tenderness: There is no abdominal tenderness  There is no guarding  Neurological:      Mental Status: She is alert and oriented to person, place, and time  Skin:     General: Skin is warm and dry     Psychiatric:         Behavior: Behavior normal            Fundal Height (cm): 34 cm  Fetal Heart Rate: 150          Problem List        Other    COVID-19 affecting pregnancy in second trimester    Overview     16 wk + covid         Encounter for supervision of normal first pregnancy in third trimester    Overview     OB transfer at 32wks [facility])  Up to date  Declines Tdap and Flu  Declined genetics  Level 2 nml  Growth US ordered         Obesity affecting pregnancy, antepartum, third trimester    Overview     Pre-gest BMI 30  Declined glucola- alternate testing- 2 wk glucose monitoring (awaiting records-per pt wnl) 37 weeks gestation of pregnancy    Intrauterine growth restriction affecting antepartum care of mother in third trimester    Overview     7% at 35 weeks with nml dopplers                Patient is here for routine OB visit  She is being followed for intrauterine growth restriction  Patient had NST, MARIAM, BPP and growth scan today showing estimated fetal weight at the 8th percentile with normal fluid and normal Dopplers  discussed recommendation for delivery at 38-39 and 6/7 weeks  Patient wishes to be delivered after 39 weeks  Continue antepartum fetal surveillance with MFM  H/o COVID at 15 weeks  Labor precautions  Discussed birth plan:  She declines Hep B, Vitamin K or eye ointment  Undecided about pediatrician    She is requesting circumcision if infant is a male  Discussed that pediatricians will typically not perform circumcision if vitamin K is not administered  Birth control: undecided      Induction of labor process discussed  Discussed indication for induction such as elective (> or equal to 39 weeks), medical/obstetric indications and alternatives which is to await spontaneous labor  Discussed cervical ripening if cervix is unfavorable with prostaglandin (vaginal misoprostol) or mechanical dilation with insertion of balloon catheter  Discussed that when electively induced nulliparous or multiparous women are compared with expectantly managed women, there is no evidence that elective induction is associated with increased risk of       Follow-up in 1 week        Татьяна Maradiaga MD  2021  3:07 PM

## 2021-09-28 NOTE — PROGRESS NOTES
Leilani Alicea has no complaints today  She reports regular fetal movements and does not report any problems  She is here today at 37w2d for an ultrasound for  Fetal growth and Dopplers as her prior ultrasound 35 weeks shows the fetus was in the 7th percentile with normal Dopplers at that visit  This is her 1st pregnancy  She declined genetic screening  This pregnancy was complicated by a COVID infection and she declined Tdap and flu vaccine  Ultrasound findings: The ultrasound today shows a fetus that is symmetric and is in the 8th percentile  The fetus has grown 12 ounces in the last 12 days which is reassuring     NST is reactive  Pregnancy ultrasound has limitations and is unable to detect all forms of fetal congenital abnormalities  The inaccuracy in the EFW can be off by 1 lb either way in the third trimester  The prior missed anatomy was reviewed and no malformations are seen but we were unable to complete all of the missed anatomy secondary to late gestational age and fetal position  Follow up recommended:   1  Recommend twice weekly NSTs and weekly MARIAM and Dopplers till delivery  2  As per ACOG delivery can be offered between 38 weeks and 0 days to 39 weeks and 6 days  Based on what I see today I would recommend delivery after 39 weeks  Pre visit time reviewing her records    5 minutes  Face to face time  5 minutes  Post visit time on documentation of note, updating her problem list, adding orders and prescriptions  5 minutes  Procedures that were completed today were charged separately  The level of decision making was straightforward       David Acharya MD

## 2021-09-28 NOTE — ASSESSMENT & PLAN NOTE
She is being followed for intrauterine growth restriction  Patient had NST, MARIAM, BPP and growth scan today showing estimated fetal weight at the 8th percentile with normal fluid and normal Dopplers  discussed recommendation for delivery at 38-39 and 6/7 weeks  Patient wishes to be delivered after 39 weeks  Continue antepartum fetal surveillance with MFM    IOL set up for 10/12/21 at 39 2/7 weeks

## 2021-09-28 NOTE — PATIENT INSTRUCTIONS
Induction of Labor   WHAT YOU NEED TO KNOW:   What is induction of labor? Induction of labor is a procedure to induce (start) your labor before it begins on its own  Medicines and other methods are used to start contractions and help your cervix soften, thin (efface), and dilate (open)  You may be given antibiotics to fight a bacterial infection you have or prevent an infection during delivery  Why might I need induction of labor? · A health problem you have, such as high blood pressure or diabetes    · A health problem your baby has, such as a slow heartbeat or poor growth inside the womb     · Problems related to your pregnancy, such as infection of the amniotic fluid, your water breaks before labor begins, or you have too little amniotic fluid    What happens during induction of labor? Your healthcare provider may use one or more of the following to induce labor:  · Cervical ripening  is a process that helps to soften and thin out your cervix  Medicines called prostaglandins may be used to ripen your cervix  These medicines can be inserted into your vagina or taken as a pill  Other methods can also be used to dilate the cervix  This includes a catheter with an inflatable balloon on the end that is inserted into your cervix  Saline injected through the catheter helps the balloon to expand  A substance that absorbs water may also be inserted into your cervix to help dilate it  · Stripping the membranes  is a procedure that causes your body to release prostaglandins naturally  Prostaglandins soften the cervix and may help to cause contractions  Your healthcare provider will sweep a gloved finger over the membranes that connect the amniotic sac to the uterus wall  · Rupturing the amniotic sac  is a procedure that is used to cause your water to break  Your healthcare provider will use a small tool to make a hole in your amniotic sac  This may help contractions to start       · Oxytocin  may be given through an IV to cause contractions to start and stay strong and regular  What are the risks of induction of labor? Medicines used to induce labor may cause too many contractions  This can lower your baby's heartbeat and decrease his or her oxygen supply  Induction of labor also increases the risk of umbilical cord prolapse  This condition causes the umbilical cord to slip back into the vagina before delivery  It can compress the cord and decrease your baby's oxygen supply  Medical induction may cause an infection in you or your baby  Medical induction may also increase your risk for a  section (), especially if it is the first time you give birth  Your uterus may rupture if you have had a  before  CARE AGREEMENT:   You have the right to help plan your care  Learn about your health condition and how it may be treated  Discuss treatment options with your healthcare providers to decide what care you want to receive  You always have the right to refuse treatment  The above information is an  only  It is not intended as medical advice for individual conditions or treatments  Talk to your doctor, nurse or pharmacist before following any medical regimen to see if it is safe and effective for you  ©  [employer]  Information is for End User's use only and may not be sold, redistributed or otherwise used for commercial purposes   All illustrations and images included in CareNotes® are the copyrighted property of [employer]  or [address] [place] [address] [employer]

## 2021-10-05 ENCOUNTER — ULTRASOUND (OUTPATIENT)
Dept: PERINATAL CARE | Facility: OTHER | Age: 21
End: 2021-10-05
Payer: COMMERCIAL

## 2021-10-05 VITALS
DIASTOLIC BLOOD PRESSURE: 78 MMHG | SYSTOLIC BLOOD PRESSURE: 117 MMHG | HEART RATE: 108 BPM | BODY MASS INDEX: 36.79 KG/M2 | WEIGHT: 207.6 LBS | HEIGHT: 63 IN

## 2021-10-05 DIAGNOSIS — O36.5930 INTRAUTERINE GROWTH RESTRICTION AFFECTING ANTEPARTUM CARE OF MOTHER IN THIRD TRIMESTER, SINGLE OR UNSPECIFIED FETUS: ICD-10-CM

## 2021-10-05 DIAGNOSIS — Z3A.38 38 WEEKS GESTATION OF PREGNANCY: Primary | ICD-10-CM

## 2021-10-05 DIAGNOSIS — O36.5930 POOR FETAL GROWTH AFFECTING MANAGEMENT OF MOTHER IN THIRD TRIMESTER, SINGLE OR UNSPECIFIED FETUS: ICD-10-CM

## 2021-10-05 PROCEDURE — 76815 OB US LIMITED FETUS(S): CPT | Performed by: OBSTETRICS & GYNECOLOGY

## 2021-10-05 PROCEDURE — 59025 FETAL NON-STRESS TEST: CPT | Performed by: OBSTETRICS & GYNECOLOGY

## 2021-10-05 PROCEDURE — 76820 UMBILICAL ARTERY ECHO: CPT | Performed by: OBSTETRICS & GYNECOLOGY

## 2021-10-07 ENCOUNTER — ROUTINE PRENATAL (OUTPATIENT)
Dept: PERINATAL CARE | Facility: OTHER | Age: 21
End: 2021-10-07
Payer: COMMERCIAL

## 2021-10-07 ENCOUNTER — ROUTINE PRENATAL (OUTPATIENT)
Dept: OBGYN CLINIC | Facility: CLINIC | Age: 21
End: 2021-10-07

## 2021-10-07 VITALS — WEIGHT: 206.4 LBS | DIASTOLIC BLOOD PRESSURE: 72 MMHG | BODY MASS INDEX: 36.56 KG/M2 | SYSTOLIC BLOOD PRESSURE: 112 MMHG

## 2021-10-07 VITALS
HEART RATE: 98 BPM | SYSTOLIC BLOOD PRESSURE: 122 MMHG | DIASTOLIC BLOOD PRESSURE: 81 MMHG | HEIGHT: 63 IN | BODY MASS INDEX: 36.64 KG/M2 | WEIGHT: 206.8 LBS

## 2021-10-07 DIAGNOSIS — B95.1 POSITIVE GBS TEST: ICD-10-CM

## 2021-10-07 DIAGNOSIS — O36.5930 INTRAUTERINE GROWTH RESTRICTION AFFECTING ANTEPARTUM CARE OF MOTHER IN THIRD TRIMESTER, SINGLE OR UNSPECIFIED FETUS: ICD-10-CM

## 2021-10-07 DIAGNOSIS — O98.512 COVID-19 AFFECTING PREGNANCY IN SECOND TRIMESTER: ICD-10-CM

## 2021-10-07 DIAGNOSIS — Z3A.38 38 WEEKS GESTATION OF PREGNANCY: ICD-10-CM

## 2021-10-07 DIAGNOSIS — Z34.03 ENCOUNTER FOR SUPERVISION OF NORMAL FIRST PREGNANCY IN THIRD TRIMESTER: ICD-10-CM

## 2021-10-07 DIAGNOSIS — Z36.3 ENCOUNTER FOR ANTENATAL SCREENING FOR MALFORMATIONS: Primary | ICD-10-CM

## 2021-10-07 DIAGNOSIS — U07.1 COVID-19 AFFECTING PREGNANCY IN SECOND TRIMESTER: ICD-10-CM

## 2021-10-07 DIAGNOSIS — O99.213 OBESITY AFFECTING PREGNANCY, ANTEPARTUM, THIRD TRIMESTER: ICD-10-CM

## 2021-10-07 DIAGNOSIS — O36.5930 INTRAUTERINE GROWTH RESTRICTION AFFECTING ANTEPARTUM CARE OF MOTHER IN THIRD TRIMESTER, SINGLE OR UNSPECIFIED FETUS: Primary | ICD-10-CM

## 2021-10-07 LAB
SL AMB  POCT GLUCOSE, UA: NORMAL
SL AMB POCT URINE PROTEIN: NORMAL

## 2021-10-07 PROCEDURE — 59025 FETAL NON-STRESS TEST: CPT | Performed by: OBSTETRICS & GYNECOLOGY

## 2021-10-07 PROCEDURE — PNV: Performed by: OBSTETRICS & GYNECOLOGY

## 2021-10-11 ENCOUNTER — HOSPITAL ENCOUNTER (INPATIENT)
Facility: HOSPITAL | Age: 21
LOS: 2 days | Discharge: HOME | End: 2021-10-13
Attending: OBSTETRICS & GYNECOLOGY | Admitting: OBSTETRICS & GYNECOLOGY
Payer: COMMERCIAL

## 2021-10-11 ENCOUNTER — ROUTINE PRENATAL (OUTPATIENT)
Dept: OBGYN CLINIC | Facility: CLINIC | Age: 21
End: 2021-10-11
Payer: COMMERCIAL

## 2021-10-11 VITALS
TEMPERATURE: 97.2 F | WEIGHT: 207.8 LBS | BODY MASS INDEX: 36.81 KG/M2 | SYSTOLIC BLOOD PRESSURE: 112 MMHG | DIASTOLIC BLOOD PRESSURE: 72 MMHG | HEART RATE: 110 BPM

## 2021-10-11 DIAGNOSIS — Z3A.39 39 WEEKS GESTATION OF PREGNANCY: Primary | ICD-10-CM

## 2021-10-11 DIAGNOSIS — Z34.03 ENCOUNTER FOR SUPERVISION OF NORMAL FIRST PREGNANCY IN THIRD TRIMESTER: ICD-10-CM

## 2021-10-11 DIAGNOSIS — O99.213 OBESITY AFFECTING PREGNANCY, ANTEPARTUM, THIRD TRIMESTER: ICD-10-CM

## 2021-10-11 DIAGNOSIS — O98.512 COVID-19 AFFECTING PREGNANCY IN SECOND TRIMESTER: ICD-10-CM

## 2021-10-11 DIAGNOSIS — O36.5930 INTRAUTERINE GROWTH RESTRICTION AFFECTING ANTEPARTUM CARE OF MOTHER IN THIRD TRIMESTER, SINGLE OR UNSPECIFIED FETUS: Primary | ICD-10-CM

## 2021-10-11 DIAGNOSIS — U07.1 COVID-19 AFFECTING PREGNANCY IN SECOND TRIMESTER: ICD-10-CM

## 2021-10-11 DIAGNOSIS — O36.5930 INTRAUTERINE GROWTH RESTRICTION AFFECTING ANTEPARTUM CARE OF MOTHER IN THIRD TRIMESTER, SINGLE OR UNSPECIFIED FETUS: ICD-10-CM

## 2021-10-11 DIAGNOSIS — Z3A.38 38 WEEKS GESTATION OF PREGNANCY: ICD-10-CM

## 2021-10-11 LAB
ABO GROUP BLD: NORMAL
ALBUMIN SERPL BCP-MCNC: 3 G/DL (ref 3.5–5)
ALP SERPL-CCNC: 190 U/L (ref 46–116)
ALT SERPL W P-5'-P-CCNC: 22 U/L (ref 12–78)
ANION GAP SERPL CALCULATED.3IONS-SCNC: 8 MMOL/L (ref 4–13)
AST SERPL W P-5'-P-CCNC: 17 U/L (ref 5–45)
BACTERIA UR QL AUTO: ABNORMAL /HPF
BASOPHILS # BLD AUTO: 0.03 THOUSANDS/ΜL (ref 0–0.1)
BASOPHILS NFR BLD AUTO: 0 % (ref 0–1)
BILIRUB SERPL-MCNC: 0.16 MG/DL (ref 0.2–1)
BILIRUB UR QL STRIP: NEGATIVE
BLD GP AB SCN SERPL QL: NEGATIVE
BUN SERPL-MCNC: 10 MG/DL (ref 5–25)
CALCIUM ALBUM COR SERPL-MCNC: 9.6 MG/DL (ref 8.3–10.1)
CALCIUM SERPL-MCNC: 8.8 MG/DL (ref 8.3–10.1)
CHLORIDE SERPL-SCNC: 102 MMOL/L (ref 100–108)
CLARITY UR: ABNORMAL
CO2 SERPL-SCNC: 26 MMOL/L (ref 21–32)
COLOR UR: YELLOW
CREAT SERPL-MCNC: 0.72 MG/DL (ref 0.6–1.3)
CREAT UR-MCNC: 80.8 MG/DL
EOSINOPHIL # BLD AUTO: 0.18 THOUSAND/ΜL (ref 0–0.61)
EOSINOPHIL NFR BLD AUTO: 2 % (ref 0–6)
ERYTHROCYTE [DISTWIDTH] IN BLOOD BY AUTOMATED COUNT: 13.2 % (ref 11.6–15.1)
GFR SERPL CREATININE-BSD FRML MDRD: 118 ML/MIN/1.73SQ M
GLUCOSE SERPL-MCNC: 92 MG/DL (ref 65–140)
GLUCOSE UR STRIP-MCNC: NEGATIVE MG/DL
HCT VFR BLD AUTO: 37.1 % (ref 34.8–46.1)
HGB BLD-MCNC: 12.2 G/DL (ref 11.5–15.4)
HGB UR QL STRIP.AUTO: NEGATIVE
IMM GRANULOCYTES # BLD AUTO: 0.04 THOUSAND/UL (ref 0–0.2)
IMM GRANULOCYTES NFR BLD AUTO: 1 % (ref 0–2)
KETONES UR STRIP-MCNC: NEGATIVE MG/DL
LEUKOCYTE ESTERASE UR QL STRIP: ABNORMAL
LYMPHOCYTES # BLD AUTO: 1.81 THOUSANDS/ΜL (ref 0.6–4.47)
LYMPHOCYTES NFR BLD AUTO: 22 % (ref 14–44)
MCH RBC QN AUTO: 27.8 PG (ref 26.8–34.3)
MCHC RBC AUTO-ENTMCNC: 32.9 G/DL (ref 31.4–37.4)
MCV RBC AUTO: 85 FL (ref 82–98)
MONOCYTES # BLD AUTO: 0.57 THOUSAND/ΜL (ref 0.17–1.22)
MONOCYTES NFR BLD AUTO: 7 % (ref 4–12)
NEUTROPHILS # BLD AUTO: 5.66 THOUSANDS/ΜL (ref 1.85–7.62)
NEUTS SEG NFR BLD AUTO: 68 % (ref 43–75)
NITRITE UR QL STRIP: NEGATIVE
NON-SQ EPI CELLS URNS QL MICRO: ABNORMAL /HPF
NRBC BLD AUTO-RTO: 0 /100 WBCS
PH UR STRIP.AUTO: 7 [PH]
PLATELET # BLD AUTO: 216 THOUSANDS/UL (ref 149–390)
PMV BLD AUTO: 12.3 FL (ref 8.9–12.7)
POTASSIUM SERPL-SCNC: 3.8 MMOL/L (ref 3.5–5.3)
PROT SERPL-MCNC: 7 G/DL (ref 6.4–8.2)
PROT UR STRIP-MCNC: NEGATIVE MG/DL
PROT UR-MCNC: 13 MG/DL
PROT/CREAT UR: 0.16 MG/G{CREAT} (ref 0–0.1)
RBC # BLD AUTO: 4.39 MILLION/UL (ref 3.81–5.12)
RBC #/AREA URNS AUTO: ABNORMAL /HPF
RH BLD: POSITIVE
SL AMB  POCT GLUCOSE, UA: NORMAL
SL AMB POCT URINE PROTEIN: NORMAL
SODIUM SERPL-SCNC: 136 MMOL/L (ref 136–145)
SP GR UR STRIP.AUTO: 1.01 (ref 1–1.03)
SPECIMEN EXPIRATION DATE: NORMAL
UROBILINOGEN UR QL STRIP.AUTO: 0.2 E.U./DL
WBC # BLD AUTO: 8.29 THOUSAND/UL (ref 4.31–10.16)
WBC #/AREA URNS AUTO: ABNORMAL /HPF

## 2021-10-11 PROCEDURE — 81001 URINALYSIS AUTO W/SCOPE: CPT | Performed by: OBSTETRICS & GYNECOLOGY

## 2021-10-11 PROCEDURE — 82570 ASSAY OF URINE CREATININE: CPT | Performed by: OBSTETRICS & GYNECOLOGY

## 2021-10-11 PROCEDURE — 59025 FETAL NON-STRESS TEST: CPT | Performed by: OBSTETRICS & GYNECOLOGY

## 2021-10-11 PROCEDURE — 3E0P7VZ INTRODUCTION OF HORMONE INTO FEMALE REPRODUCTIVE, VIA NATURAL OR ARTIFICIAL OPENING: ICD-10-PCS | Performed by: STUDENT IN AN ORGANIZED HEALTH CARE EDUCATION/TRAINING PROGRAM

## 2021-10-11 PROCEDURE — 86901 BLOOD TYPING SEROLOGIC RH(D): CPT | Performed by: OBSTETRICS & GYNECOLOGY

## 2021-10-11 PROCEDURE — 84156 ASSAY OF PROTEIN URINE: CPT | Performed by: OBSTETRICS & GYNECOLOGY

## 2021-10-11 PROCEDURE — 76815 OB US LIMITED FETUS(S): CPT | Performed by: OBSTETRICS & GYNECOLOGY

## 2021-10-11 PROCEDURE — 87086 URINE CULTURE/COLONY COUNT: CPT | Performed by: OBSTETRICS & GYNECOLOGY

## 2021-10-11 PROCEDURE — 86592 SYPHILIS TEST NON-TREP QUAL: CPT | Performed by: OBSTETRICS & GYNECOLOGY

## 2021-10-11 PROCEDURE — 99203 OFFICE O/P NEW LOW 30 MIN: CPT

## 2021-10-11 PROCEDURE — NC001 PR NO CHARGE: Performed by: OBSTETRICS & GYNECOLOGY

## 2021-10-11 PROCEDURE — 85025 COMPLETE CBC W/AUTO DIFF WBC: CPT | Performed by: OBSTETRICS & GYNECOLOGY

## 2021-10-11 PROCEDURE — 86900 BLOOD TYPING SEROLOGIC ABO: CPT | Performed by: OBSTETRICS & GYNECOLOGY

## 2021-10-11 PROCEDURE — PNV: Performed by: OBSTETRICS & GYNECOLOGY

## 2021-10-11 PROCEDURE — 86850 RBC ANTIBODY SCREEN: CPT | Performed by: OBSTETRICS & GYNECOLOGY

## 2021-10-11 PROCEDURE — 4A1HXCZ MONITORING OF PRODUCTS OF CONCEPTION, CARDIAC RATE, EXTERNAL APPROACH: ICD-10-PCS | Performed by: STUDENT IN AN ORGANIZED HEALTH CARE EDUCATION/TRAINING PROGRAM

## 2021-10-11 PROCEDURE — 80053 COMPREHEN METABOLIC PANEL: CPT | Performed by: OBSTETRICS & GYNECOLOGY

## 2021-10-11 RX ORDER — SODIUM CHLORIDE, SODIUM LACTATE, POTASSIUM CHLORIDE, CALCIUM CHLORIDE 600; 310; 30; 20 MG/100ML; MG/100ML; MG/100ML; MG/100ML
125 INJECTION, SOLUTION INTRAVENOUS CONTINUOUS
Status: DISCONTINUED | OUTPATIENT
Start: 2021-10-11 | End: 2021-10-13 | Stop reason: HOSPADM

## 2021-10-11 RX ORDER — ONDANSETRON 2 MG/ML
4 INJECTION INTRAMUSCULAR; INTRAVENOUS EVERY 6 HOURS PRN
Status: DISCONTINUED | OUTPATIENT
Start: 2021-10-11 | End: 2021-10-12

## 2021-10-11 RX ORDER — OXYTOCIN/RINGER'S LACTATE 30/500 ML
1-30 PLASTIC BAG, INJECTION (ML) INTRAVENOUS
Status: DISCONTINUED | OUTPATIENT
Start: 2021-10-11 | End: 2021-10-12

## 2021-10-11 RX ADMIN — Medication 25 MCG: at 14:35

## 2021-10-11 RX ADMIN — Medication 2 MILLI-UNITS/MIN: at 19:11

## 2021-10-11 RX ADMIN — SODIUM CHLORIDE 5 MILLION UNITS: 0.9 INJECTION, SOLUTION INTRAVENOUS at 14:43

## 2021-10-11 RX ADMIN — SODIUM CHLORIDE, SODIUM LACTATE, POTASSIUM CHLORIDE, AND CALCIUM CHLORIDE 125 ML/HR: .6; .31; .03; .02 INJECTION, SOLUTION INTRAVENOUS at 14:34

## 2021-10-11 RX ADMIN — SODIUM CHLORIDE 2.5 MILLION UNITS: 9 INJECTION, SOLUTION INTRAVENOUS at 18:34

## 2021-10-11 RX ADMIN — SODIUM CHLORIDE 2.5 MILLION UNITS: 9 INJECTION, SOLUTION INTRAVENOUS at 22:54

## 2021-10-12 ENCOUNTER — ANESTHESIA (INPATIENT)
Dept: ANESTHESIOLOGY | Facility: HOSPITAL | Age: 21
End: 2021-10-12
Payer: COMMERCIAL

## 2021-10-12 ENCOUNTER — ANESTHESIA EVENT (INPATIENT)
Dept: ANESTHESIOLOGY | Facility: HOSPITAL | Age: 21
End: 2021-10-12
Payer: COMMERCIAL

## 2021-10-12 LAB
BACTERIA UR CULT: NORMAL
BASE EXCESS BLDCOA CALC-SCNC: -3.8 MMOL/L (ref 3–11)
BASE EXCESS BLDCOV CALC-SCNC: -2.4 MMOL/L (ref 1–9)
HCO3 BLDCOA-SCNC: 22.1 MMOL/L (ref 17.3–27.3)
HCO3 BLDCOV-SCNC: 21.7 MMOL/L (ref 12.2–28.6)
O2 CT VFR BLDCOA CALC: 14.4 ML/DL
OXYHGB MFR BLDCOA: 61.7 %
OXYHGB MFR BLDCOV: 67.8 %
PCO2 BLDCOA: 43.2 MM[HG] (ref 30–60)
PCO2 BLDCOV: 35.8 MM HG (ref 27–43)
PH BLDCOA: 7.33 [PH] (ref 7.23–7.43)
PH BLDCOV: 7.4 [PH] (ref 7.19–7.49)
PO2 BLDCOA: 26.7 MM HG (ref 5–25)
PO2 BLDCOV: 27.8 MM HG (ref 15–45)
RPR SER QL: NORMAL
SAO2 % BLDCOV: 15.1 ML/DL

## 2021-10-12 PROCEDURE — 99024 POSTOP FOLLOW-UP VISIT: CPT | Performed by: OBSTETRICS & GYNECOLOGY

## 2021-10-12 PROCEDURE — 82805 BLOOD GASES W/O2 SATURATION: CPT | Performed by: OBSTETRICS & GYNECOLOGY

## 2021-10-12 PROCEDURE — 0HQ9XZZ REPAIR PERINEUM SKIN, EXTERNAL APPROACH: ICD-10-PCS | Performed by: STUDENT IN AN ORGANIZED HEALTH CARE EDUCATION/TRAINING PROGRAM

## 2021-10-12 PROCEDURE — 59400 OBSTETRICAL CARE: CPT | Performed by: OBSTETRICS & GYNECOLOGY

## 2021-10-12 PROCEDURE — 0UQGXZZ REPAIR VAGINA, EXTERNAL APPROACH: ICD-10-PCS | Performed by: STUDENT IN AN ORGANIZED HEALTH CARE EDUCATION/TRAINING PROGRAM

## 2021-10-12 RX ORDER — ROPIVACAINE HYDROCHLORIDE 2 MG/ML
INJECTION, SOLUTION EPIDURAL; INFILTRATION; PERINEURAL
Status: COMPLETED
Start: 2021-10-12 | End: 2021-10-12

## 2021-10-12 RX ORDER — LIDOCAINE HYDROCHLORIDE AND EPINEPHRINE 15; 5 MG/ML; UG/ML
INJECTION, SOLUTION EPIDURAL
Status: COMPLETED | OUTPATIENT
Start: 2021-10-12 | End: 2021-10-12

## 2021-10-12 RX ORDER — DIAPER,BRIEF,INFANT-TODD,DISP
1 EACH MISCELLANEOUS 4 TIMES DAILY PRN
Status: DISCONTINUED | OUTPATIENT
Start: 2021-10-12 | End: 2021-10-13 | Stop reason: HOSPADM

## 2021-10-12 RX ORDER — DOCUSATE SODIUM 100 MG/1
100 CAPSULE, LIQUID FILLED ORAL 2 TIMES DAILY
Status: DISCONTINUED | OUTPATIENT
Start: 2021-10-12 | End: 2021-10-13 | Stop reason: HOSPADM

## 2021-10-12 RX ORDER — SIMETHICONE 80 MG
80 TABLET,CHEWABLE ORAL 4 TIMES DAILY PRN
Status: DISCONTINUED | OUTPATIENT
Start: 2021-10-12 | End: 2021-10-13 | Stop reason: HOSPADM

## 2021-10-12 RX ORDER — ONDANSETRON 2 MG/ML
4 INJECTION INTRAMUSCULAR; INTRAVENOUS EVERY 8 HOURS PRN
Status: DISCONTINUED | OUTPATIENT
Start: 2021-10-12 | End: 2021-10-13 | Stop reason: HOSPADM

## 2021-10-12 RX ORDER — ROPIVACAINE HYDROCHLORIDE 2 MG/ML
INJECTION, SOLUTION EPIDURAL; INFILTRATION; PERINEURAL AS NEEDED
Status: DISCONTINUED | OUTPATIENT
Start: 2021-10-12 | End: 2021-10-12 | Stop reason: HOSPADM

## 2021-10-12 RX ORDER — IBUPROFEN 600 MG/1
600 TABLET ORAL EVERY 6 HOURS PRN
Status: DISCONTINUED | OUTPATIENT
Start: 2021-10-12 | End: 2021-10-13 | Stop reason: HOSPADM

## 2021-10-12 RX ORDER — CHLOROPROCAINE HYDROCHLORIDE 30 MG/ML
INJECTION, SOLUTION EPIDURAL; INFILTRATION; INTRACAUDAL; PERINEURAL AS NEEDED
Status: DISCONTINUED | OUTPATIENT
Start: 2021-10-12 | End: 2021-10-12 | Stop reason: HOSPADM

## 2021-10-12 RX ORDER — DIPHENHYDRAMINE HCL 25 MG
25 TABLET ORAL EVERY 6 HOURS PRN
Status: DISCONTINUED | OUTPATIENT
Start: 2021-10-12 | End: 2021-10-13 | Stop reason: HOSPADM

## 2021-10-12 RX ORDER — ACETAMINOPHEN 325 MG/1
650 TABLET ORAL EVERY 4 HOURS PRN
Status: DISCONTINUED | OUTPATIENT
Start: 2021-10-12 | End: 2021-10-13 | Stop reason: HOSPADM

## 2021-10-12 RX ORDER — CALCIUM CARBONATE 200(500)MG
1000 TABLET,CHEWABLE ORAL DAILY PRN
Status: DISCONTINUED | OUTPATIENT
Start: 2021-10-12 | End: 2021-10-13 | Stop reason: HOSPADM

## 2021-10-12 RX ADMIN — ROPIVACAINE HYDROCHLORIDE: 2 INJECTION, SOLUTION EPIDURAL; INFILTRATION at 01:34

## 2021-10-12 RX ADMIN — SODIUM CHLORIDE, SODIUM LACTATE, POTASSIUM CHLORIDE, AND CALCIUM CHLORIDE 999 ML/HR: .6; .31; .03; .02 INJECTION, SOLUTION INTRAVENOUS at 00:34

## 2021-10-12 RX ADMIN — ROPIVACAINE HYDROCHLORIDE 3 ML: 2 INJECTION, SOLUTION EPIDURAL; INFILTRATION at 01:27

## 2021-10-12 RX ADMIN — SODIUM CHLORIDE 2.5 MILLION UNITS: 9 INJECTION, SOLUTION INTRAVENOUS at 07:00

## 2021-10-12 RX ADMIN — SODIUM CHLORIDE, SODIUM LACTATE, POTASSIUM CHLORIDE, AND CALCIUM CHLORIDE 125 ML/HR: .6; .31; .03; .02 INJECTION, SOLUTION INTRAVENOUS at 02:27

## 2021-10-12 RX ADMIN — IBUPROFEN 600 MG: 600 TABLET ORAL at 20:16

## 2021-10-12 RX ADMIN — CHLOROPROCAINE HYDROCHLORIDE 5 ML: 30 INJECTION, SOLUTION EPIDURAL; INFILTRATION; INTRACAUDAL; PERINEURAL at 06:01

## 2021-10-12 RX ADMIN — SODIUM CHLORIDE 2.5 MILLION UNITS: 9 INJECTION, SOLUTION INTRAVENOUS at 02:52

## 2021-10-12 RX ADMIN — BENZOCAINE AND LEVOMENTHOL: 200; 5 SPRAY TOPICAL at 11:25

## 2021-10-12 RX ADMIN — LIDOCAINE HYDROCHLORIDE AND EPINEPHRINE 3 ML: 15; 5 INJECTION, SOLUTION EPIDURAL at 01:18

## 2021-10-12 RX ADMIN — WITCH HAZEL 1 PAD: 500 SOLUTION RECTAL; TOPICAL at 11:26

## 2021-10-12 RX ADMIN — IBUPROFEN 600 MG: 600 TABLET ORAL at 12:29

## 2021-10-12 RX ADMIN — DOCUSATE SODIUM 100 MG: 100 CAPSULE ORAL at 11:26

## 2021-10-12 RX ADMIN — ROPIVACAINE HYDROCHLORIDE 10 ML/HR: 2 INJECTION, SOLUTION EPIDURAL; INFILTRATION at 01:30

## 2021-10-12 RX ADMIN — ROPIVACAINE HYDROCHLORIDE 3 ML: 2 INJECTION, SOLUTION EPIDURAL; INFILTRATION at 01:23

## 2021-10-12 RX ADMIN — DOCUSATE SODIUM 100 MG: 100 CAPSULE ORAL at 17:53

## 2021-10-12 RX ADMIN — ROPIVACAINE HYDROCHLORIDE 4 ML: 2 INJECTION, SOLUTION EPIDURAL; INFILTRATION at 01:19

## 2021-10-13 VITALS
SYSTOLIC BLOOD PRESSURE: 130 MMHG | BODY MASS INDEX: 36.68 KG/M2 | OXYGEN SATURATION: 97 % | TEMPERATURE: 98.1 F | RESPIRATION RATE: 18 BRPM | WEIGHT: 207 LBS | HEIGHT: 63 IN | DIASTOLIC BLOOD PRESSURE: 98 MMHG | HEART RATE: 72 BPM

## 2021-10-13 PROCEDURE — 99024 POSTOP FOLLOW-UP VISIT: CPT | Performed by: OBSTETRICS & GYNECOLOGY

## 2021-10-13 RX ORDER — ACETAMINOPHEN 325 MG/1
650 TABLET ORAL EVERY 4 HOURS PRN
Qty: 30 TABLET | Refills: 0 | Status: SHIPPED | OUTPATIENT
Start: 2021-10-13 | End: 2022-04-08

## 2021-10-13 RX ORDER — IBUPROFEN 600 MG/1
600 TABLET ORAL EVERY 6 HOURS PRN
Qty: 30 TABLET | Refills: 0 | Status: SHIPPED | OUTPATIENT
Start: 2021-10-13 | End: 2022-04-08

## 2021-10-13 RX ADMIN — DOCUSATE SODIUM 100 MG: 100 CAPSULE ORAL at 09:14

## 2021-10-16 ENCOUNTER — APPOINTMENT (EMERGENCY)
Dept: CT IMAGING | Facility: HOSPITAL | Age: 21
End: 2021-10-16
Payer: COMMERCIAL

## 2021-10-16 ENCOUNTER — HOSPITAL ENCOUNTER (OUTPATIENT)
Facility: HOSPITAL | Age: 21
Discharge: HOME | End: 2021-10-16
Attending: OBSTETRICS & GYNECOLOGY | Admitting: OBSTETRICS & GYNECOLOGY
Payer: COMMERCIAL

## 2021-10-16 ENCOUNTER — HOSPITAL ENCOUNTER (EMERGENCY)
Facility: HOSPITAL | Age: 21
Discharge: HOME | End: 2021-10-17
Attending: EMERGENCY MEDICINE | Admitting: EMERGENCY MEDICINE
Payer: COMMERCIAL

## 2021-10-16 ENCOUNTER — NURSE TRIAGE (OUTPATIENT)
Dept: OTHER | Facility: OTHER | Age: 21
End: 2021-10-16

## 2021-10-16 VITALS
SYSTOLIC BLOOD PRESSURE: 148 MMHG | OXYGEN SATURATION: 98 % | TEMPERATURE: 98 F | RESPIRATION RATE: 20 BRPM | HEART RATE: 79 BPM | DIASTOLIC BLOOD PRESSURE: 88 MMHG

## 2021-10-16 DIAGNOSIS — R51.9 HEADACHE: Primary | ICD-10-CM

## 2021-10-16 DIAGNOSIS — R06.00 DYSPNEA ON EXERTION: ICD-10-CM

## 2021-10-16 DIAGNOSIS — R07.89 CHEST DISCOMFORT: ICD-10-CM

## 2021-10-16 LAB
ALBUMIN SERPL BCP-MCNC: 2.9 G/DL (ref 3.5–5)
ALP SERPL-CCNC: 134 U/L (ref 46–116)
ALT SERPL W P-5'-P-CCNC: 36 U/L (ref 12–78)
ANION GAP SERPL CALCULATED.3IONS-SCNC: 9 MMOL/L (ref 4–13)
AST SERPL W P-5'-P-CCNC: 24 U/L (ref 5–45)
BASOPHILS # BLD AUTO: 0.04 THOUSANDS/ΜL (ref 0–0.1)
BASOPHILS NFR BLD AUTO: 0 % (ref 0–1)
BILIRUB SERPL-MCNC: 0.13 MG/DL (ref 0.2–1)
BUN SERPL-MCNC: 15 MG/DL (ref 5–25)
CALCIUM ALBUM COR SERPL-MCNC: 9.7 MG/DL (ref 8.3–10.1)
CALCIUM SERPL-MCNC: 8.8 MG/DL (ref 8.3–10.1)
CHLORIDE SERPL-SCNC: 107 MMOL/L (ref 100–108)
CO2 SERPL-SCNC: 26 MMOL/L (ref 21–32)
CREAT SERPL-MCNC: 0.78 MG/DL (ref 0.6–1.3)
EOSINOPHIL # BLD AUTO: 0.42 THOUSAND/ΜL (ref 0–0.61)
EOSINOPHIL NFR BLD AUTO: 4 % (ref 0–6)
ERYTHROCYTE [DISTWIDTH] IN BLOOD BY AUTOMATED COUNT: 14 % (ref 11.6–15.1)
GFR SERPL CREATININE-BSD FRML MDRD: 107 ML/MIN/1.73SQ M
GLUCOSE SERPL-MCNC: 90 MG/DL (ref 65–140)
HCT VFR BLD AUTO: 32.4 % (ref 34.8–46.1)
HGB BLD-MCNC: 10.4 G/DL (ref 11.5–15.4)
IMM GRANULOCYTES # BLD AUTO: 0.05 THOUSAND/UL (ref 0–0.2)
IMM GRANULOCYTES NFR BLD AUTO: 1 % (ref 0–2)
LYMPHOCYTES # BLD AUTO: 2.74 THOUSANDS/ΜL (ref 0.6–4.47)
LYMPHOCYTES NFR BLD AUTO: 28 % (ref 14–44)
MCH RBC QN AUTO: 27.2 PG (ref 26.8–34.3)
MCHC RBC AUTO-ENTMCNC: 32.1 G/DL (ref 31.4–37.4)
MCV RBC AUTO: 85 FL (ref 82–98)
MONOCYTES # BLD AUTO: 0.63 THOUSAND/ΜL (ref 0.17–1.22)
MONOCYTES NFR BLD AUTO: 7 % (ref 4–12)
NEUTROPHILS # BLD AUTO: 5.85 THOUSANDS/ΜL (ref 1.85–7.62)
NEUTS SEG NFR BLD AUTO: 60 % (ref 43–75)
NRBC BLD AUTO-RTO: 0 /100 WBCS
PLATELET # BLD AUTO: 223 THOUSANDS/UL (ref 149–390)
PMV BLD AUTO: 11.2 FL (ref 8.9–12.7)
POTASSIUM SERPL-SCNC: 3.8 MMOL/L (ref 3.5–5.3)
PROT SERPL-MCNC: 6.8 G/DL (ref 6.4–8.2)
RBC # BLD AUTO: 3.83 MILLION/UL (ref 3.81–5.12)
SODIUM SERPL-SCNC: 142 MMOL/L (ref 136–145)
TROPONIN I SERPL-MCNC: <0.02 NG/ML
WBC # BLD AUTO: 9.73 THOUSAND/UL (ref 4.31–10.16)

## 2021-10-16 PROCEDURE — G1004 CDSM NDSC: HCPCS

## 2021-10-16 PROCEDURE — 80053 COMPREHEN METABOLIC PANEL: CPT | Performed by: EMERGENCY MEDICINE

## 2021-10-16 PROCEDURE — 36415 COLL VENOUS BLD VENIPUNCTURE: CPT | Performed by: EMERGENCY MEDICINE

## 2021-10-16 PROCEDURE — 93005 ELECTROCARDIOGRAM TRACING: CPT

## 2021-10-16 PROCEDURE — 85025 COMPLETE CBC W/AUTO DIFF WBC: CPT | Performed by: EMERGENCY MEDICINE

## 2021-10-16 PROCEDURE — 71275 CT ANGIOGRAPHY CHEST: CPT

## 2021-10-16 PROCEDURE — 99285 EMERGENCY DEPT VISIT HI MDM: CPT | Performed by: EMERGENCY MEDICINE

## 2021-10-16 PROCEDURE — 99211 OFF/OP EST MAY X REQ PHY/QHP: CPT

## 2021-10-16 PROCEDURE — 84484 ASSAY OF TROPONIN QUANT: CPT | Performed by: EMERGENCY MEDICINE

## 2021-10-16 PROCEDURE — 99284 EMERGENCY DEPT VISIT MOD MDM: CPT

## 2021-10-16 RX ORDER — ACETAMINOPHEN 325 MG/1
975 TABLET ORAL ONCE
Status: DISCONTINUED | OUTPATIENT
Start: 2021-10-16 | End: 2021-10-17 | Stop reason: HOSPADM

## 2021-10-16 RX ADMIN — IOHEXOL 85 ML: 350 INJECTION, SOLUTION INTRAVENOUS at 23:48

## 2021-10-17 VITALS
WEIGHT: 192.46 LBS | BODY MASS INDEX: 34.09 KG/M2 | RESPIRATION RATE: 18 BRPM | SYSTOLIC BLOOD PRESSURE: 134 MMHG | OXYGEN SATURATION: 97 % | DIASTOLIC BLOOD PRESSURE: 86 MMHG | HEART RATE: 83 BPM

## 2021-10-17 LAB
ATRIAL RATE: 68 BPM
BACTERIA UR QL AUTO: ABNORMAL /HPF
BILIRUB UR QL STRIP: NEGATIVE
CLARITY UR: ABNORMAL
COLOR UR: YELLOW
GLUCOSE UR STRIP-MCNC: NEGATIVE MG/DL
HGB UR QL STRIP.AUTO: ABNORMAL
KETONES UR STRIP-MCNC: NEGATIVE MG/DL
LEUKOCYTE ESTERASE UR QL STRIP: ABNORMAL
MUCOUS THREADS UR QL AUTO: ABNORMAL
NITRITE UR QL STRIP: NEGATIVE
NON-SQ EPI CELLS URNS QL MICRO: ABNORMAL /HPF
P AXIS: 17 DEGREES
PH UR STRIP.AUTO: 6.5 [PH] (ref 4.5–8)
PR INTERVAL: 148 MS
PROT UR STRIP-MCNC: NEGATIVE MG/DL
QRS AXIS: 65 DEGREES
QRSD INTERVAL: 90 MS
QT INTERVAL: 380 MS
QTC INTERVAL: 404 MS
RBC #/AREA URNS AUTO: ABNORMAL /HPF
SP GR UR STRIP.AUTO: 1.01 (ref 1–1.03)
T WAVE AXIS: 29 DEGREES
UROBILINOGEN UR QL STRIP.AUTO: 0.2 E.U./DL
VENTRICULAR RATE: 68 BPM
WBC #/AREA URNS AUTO: ABNORMAL /HPF

## 2021-10-17 PROCEDURE — 81001 URINALYSIS AUTO W/SCOPE: CPT

## 2021-10-17 PROCEDURE — 93010 ELECTROCARDIOGRAM REPORT: CPT | Performed by: INTERNAL MEDICINE

## 2021-10-22 ENCOUNTER — OFFICE VISIT (OUTPATIENT)
Dept: POSTPARTUM | Facility: CLINIC | Age: 21
End: 2021-10-22
Payer: COMMERCIAL

## 2021-10-22 ENCOUNTER — TELEPHONE (OUTPATIENT)
Dept: OBGYN CLINIC | Facility: CLINIC | Age: 21
End: 2021-10-22

## 2021-10-22 VITALS — SYSTOLIC BLOOD PRESSURE: 138 MMHG | DIASTOLIC BLOOD PRESSURE: 96 MMHG

## 2021-10-22 DIAGNOSIS — Z71.89 COUNSELING FOR PARENT-CHILD PROBLEM: Primary | ICD-10-CM

## 2021-10-22 DIAGNOSIS — Z62.820 COUNSELING FOR PARENT-CHILD PROBLEM: Primary | ICD-10-CM

## 2021-10-22 PROCEDURE — 99404 PREV MED CNSL INDIV APPRX 60: CPT | Performed by: PEDIATRICS

## 2021-10-25 ENCOUNTER — TELEPHONE (OUTPATIENT)
Dept: OBGYN CLINIC | Facility: CLINIC | Age: 21
End: 2021-10-25

## 2021-11-05 ENCOUNTER — OFFICE VISIT (OUTPATIENT)
Dept: POSTPARTUM | Facility: CLINIC | Age: 21
End: 2021-11-05
Payer: COMMERCIAL

## 2021-11-05 VITALS — SYSTOLIC BLOOD PRESSURE: 116 MMHG | DIASTOLIC BLOOD PRESSURE: 84 MMHG

## 2021-11-05 DIAGNOSIS — Z71.89 ENCOUNTER FOR BREAST FEEDING COUNSELING: Primary | ICD-10-CM

## 2021-11-05 DIAGNOSIS — R52 PAIN AGGRAVATED BY BREAST FEEDING: ICD-10-CM

## 2021-11-05 DIAGNOSIS — O92.79 PAIN AGGRAVATED BY BREAST FEEDING: ICD-10-CM

## 2021-11-05 PROCEDURE — 99404 PREV MED CNSL INDIV APPRX 60: CPT | Performed by: PEDIATRICS

## 2021-11-09 ENCOUNTER — OFFICE VISIT (OUTPATIENT)
Dept: POSTPARTUM | Facility: CLINIC | Age: 21
End: 2021-11-09

## 2021-11-09 ENCOUNTER — POSTPARTUM VISIT (OUTPATIENT)
Dept: OBGYN CLINIC | Facility: CLINIC | Age: 21
End: 2021-11-09

## 2021-11-09 VITALS
WEIGHT: 185.8 LBS | BODY MASS INDEX: 32.91 KG/M2 | TEMPERATURE: 98.2 F | HEART RATE: 82 BPM | DIASTOLIC BLOOD PRESSURE: 78 MMHG | SYSTOLIC BLOOD PRESSURE: 120 MMHG

## 2021-11-09 DIAGNOSIS — I73.9 VASOSPASM (HCC): Primary | ICD-10-CM

## 2021-11-09 DIAGNOSIS — N39.46 MIXED STRESS AND URGE URINARY INCONTINENCE: ICD-10-CM

## 2021-11-09 PROBLEM — R32 URINARY INCONTINENCE: Status: ACTIVE | Noted: 2021-11-09

## 2021-11-09 PROCEDURE — PNV: Performed by: OBSTETRICS & GYNECOLOGY

## 2021-12-15 ENCOUNTER — OFFICE VISIT (OUTPATIENT)
Dept: POSTPARTUM | Facility: CLINIC | Age: 21
End: 2021-12-15

## 2021-12-30 ENCOUNTER — OFFICE VISIT (OUTPATIENT)
Dept: POSTPARTUM | Facility: CLINIC | Age: 21
End: 2021-12-30
Payer: COMMERCIAL

## 2021-12-30 ENCOUNTER — TELEPHONE (OUTPATIENT)
Dept: OBGYN CLINIC | Facility: CLINIC | Age: 21
End: 2021-12-30

## 2021-12-30 DIAGNOSIS — Z71.89 ENCOUNTER FOR BREAST FEEDING COUNSELING: Primary | ICD-10-CM

## 2021-12-30 PROCEDURE — 99404 PREV MED CNSL INDIV APPRX 60: CPT | Performed by: PEDIATRICS

## 2022-01-31 ENCOUNTER — OFFICE VISIT (OUTPATIENT)
Dept: POSTPARTUM | Facility: CLINIC | Age: 22
End: 2022-01-31
Payer: COMMERCIAL

## 2022-01-31 DIAGNOSIS — O92.79 PAIN AGGRAVATED BY BREAST FEEDING: ICD-10-CM

## 2022-01-31 DIAGNOSIS — Z71.89 ENCOUNTER FOR BREAST FEEDING COUNSELING: Primary | ICD-10-CM

## 2022-01-31 DIAGNOSIS — R52 PAIN AGGRAVATED BY BREAST FEEDING: ICD-10-CM

## 2022-01-31 PROCEDURE — 99403 PREV MED CNSL INDIV APPRX 45: CPT | Performed by: PEDIATRICS

## 2022-01-31 NOTE — PROGRESS NOTES
BREAST FEEDING FOLLOW UP VISIT    Informant/Relationship: Nelly Connell    Discussion of General Lactation Issues: Nelly Connell feels everything is going well  They had a rough week last week but things have gotten better  She does feel that Trent's upper lip is still causing pain at times  Infant is 1 months old today  Interval Breastfeeding History:    Frequency of breast feeding: On demand every 2 5-3 hours during the day  Sleeps 6-75 hours at night  Does mother feel breastfeeding is effective: Yes  Does infant appear satisfied after nursing:Yes  Stooling pattern normal:Yes  Urinating frequently:Yes  Using shield or shells:No    Alternative/Artificial Feedings:   Bottle: Nelly Connell is trying but Alexandria Grady is struggling to take a bottle  Cup: No  Syringe/Finger: No           Formula Type: none                     Amount: n/a            Breast Milk:                      Amount: about an ounce            Frequency Q 2 57 5 Hr between feedings  Elimination Problems: No      Equipment:  Nipple Shield             Type: none             Size: n/a             Frequency of Use: n/a  Pump            Type: Lansinoh manual pump and Spectra s2            Frequency of Use: once a day  Expresses about 1-1 5 ounces per session  Shells            Type: none            Frequency of use: n/a    Equipment Problems: no    Mom:  Breast: Large slightly asymmetrical breasts   R>L  Nipple Assessment in General: Small everted nipples bilaterally   Both nipples erythematous at the base at 10 o'clock on the right and 2 o'clock on the left  Irritated Romero's glands at those locations  This corresponds with Trent's upper lip placement during feedings  Mother's Awareness of Feeding Cues                 [id number]:  Yes                  YBOVXBLMBZ: Yes  Support System: FOB, extended family  History of Breastfeeding: none  Changes/Stressors/Violence: Angy is concerned that Trent's upper lip is still not flanging at every feeding  Concerns/Goals: Angy desires to  breastfeed for a year      Problems with Mom: intermittent nipple pain  2 episodes of fever with breast pain and erythema which resolved after 24 hours with moist heat and milk removal         Physical Exam  Constitutional:       Appearance: Normal appearance  HENT:      Head: Normocephalic and atraumatic  Pulmonary:      Effort: Pulmonary effort is normal    Musculoskeletal:         General: Normal range of motion  Cervical back: Normal range of motion and neck supple  Neurological:      Mental Status: She is alert and oriented to person, place, and time  Skin:     General: Skin is warm and dry  Psychiatric:         Mood and Affect: Mood normal          Behavior: Behavior normal          Thought Content: Thought content normal          Judgment: Judgment normal          Infant:  Behaviors: Alert  Color: Pink  Birth weight: 2905gram  Current weight: 5340gram    Problems with infant: none    General Appearance:  Alert, active, no distress                            Head:  Normocephalic, AFOF, sutures opposed                            Eyes:   Conjunctiva clear, no drainage                            Ears:   Normally placed, no anomolies                           Nose:   no drainage or erythema                          Mouth:  No lesions  Tongue extends to the lower lip   Tongue lateralizes and elevates well   Some good cupping of my finger and effective sucking noted on exam today  Labial frenulum is very thick, elasticity has increased since last exam but still limits ability to flange upper lip to the nares   Upper alveolar ridge is significantly notched and blanches when the upper lip is flanged   Large sucking blister in the center of the upper lip   Cathy palate   Recessed chin                             Neck:  Supple, symmetrical, trachea midline                Respiratory:  No grunting, flaring, retractions, breath sounds clear and equal         Cardiovascular:  Regular rate and rhythm  No murmur  Adequate perfusion/capillary refill                   Abdomen:    Soft, non-tender, no masses, bowel sounds present            Genitourinary:  Normal female genitalia                         Spine:   No abnormalities noted       Musculoskeletal:   Full range of motion         Skin/Hair/Nails:   Skin warm, dry, and intact, hemangioma on her back on the spine at the shoulder level               Neurologic:   No abnormal movement     Latch:  Efficiency:               Lips Flanged: Yes              Depth of latch: good              Audible Swallow: Yes, frequently              Visible Milk: Yes              Wide Open/ Asymmetrical: Yes              Suck Swallow Cycle: Breathing: unlabored, Coordinated: yes  Nipple Assessment after latch: Normal: elongated/eraser, no discoloration and no damage noted  Latch Problems: None    Position:  Infant's Ergonomics/Body               Body Alignment: Yes               Head Supported: Yes               Close to Mom's body/ Lifted/ Supported: Yes               Mom's Ergonomics/Body: Yes                           Supported: Yes                           Sitting Back: Yes                           Brings Baby to her breast: Yes  Positioning Problems: None      Handouts:   None    Education:  Reviewed Mom/Breast care: Discussed concerns with recurrent mastitis and suggested additional evaluation if symptoms return  Plan:   Reassurance and support given  Breastfeeding continues to improve  Tri Pack is growing well at this time  I encouraged Marco A Stevens to continue with the current plan  I encouraged her to return for more evaluation if she develops symptoms of mastitis again, if her nipple pain does not continue to improve/resolve or if there are concerns with Trent's weight gain at any time        I have spent 45 minutes with Patient and family today in which greater than 50% of this time was spent in counseling/coordination of care regarding Patient and family education

## 2022-01-31 NOTE — PATIENT INSTRUCTIONS
Continue with your current plan  Please call for another appointment if your symptoms of mastitis occur again, your nipple pain does not continue to improve, or if there are any concerns with Trent's weight gain  Please call with any questions or concerns

## 2022-02-09 NOTE — PROGRESS NOTES
I have reviewed the notes, assessments, and/or procedures performed by Ramila Glover RN, IBCLC, I concur with her/his documentation of Ellen Luevano MD 02/09/22

## 2022-03-19 ENCOUNTER — PATIENT MESSAGE (OUTPATIENT)
Dept: OBGYN CLINIC | Facility: CLINIC | Age: 22
End: 2022-03-19

## 2022-03-19 DIAGNOSIS — N94.6 DYSMENORRHEA: Primary | ICD-10-CM

## 2022-03-21 NOTE — TELEPHONE ENCOUNTER
Pt with severe dysmenorrhea s/p  in october  Recommend visit  Order for [address] placed    Please contact pt to schedule appt

## 2022-04-01 ENCOUNTER — HOSPITAL ENCOUNTER (OUTPATIENT)
Dept: ULTRASOUND IMAGING | Facility: MEDICAL CENTER | Age: 22
Discharge: HOME | End: 2022-04-01
Payer: COMMERCIAL

## 2022-04-01 DIAGNOSIS — N94.6 DYSMENORRHEA: ICD-10-CM

## 2022-04-01 PROCEDURE — 76856 US EXAM PELVIC COMPLETE: CPT

## 2022-04-01 PROCEDURE — 76830 TRANSVAGINAL US NON-OB: CPT

## 2022-04-07 PROBLEM — O99.213 OBESITY AFFECTING PREGNANCY, ANTEPARTUM, THIRD TRIMESTER: Status: RESOLVED | Noted: 2021-08-27 | Resolved: 2022-04-07

## 2022-04-07 PROBLEM — R32 URINARY INCONTINENCE: Status: RESOLVED | Noted: 2021-11-09 | Resolved: 2022-04-07

## 2022-04-07 PROBLEM — O36.5930 INTRAUTERINE GROWTH RESTRICTION AFFECTING ANTEPARTUM CARE OF MOTHER IN THIRD TRIMESTER: Status: RESOLVED | Noted: 2021-09-16 | Resolved: 2022-04-07

## 2022-04-07 PROBLEM — O36.5930 POOR FETAL GROWTH AFFECTING MANAGEMENT OF MOTHER IN THIRD TRIMESTER: Status: RESOLVED | Noted: 2021-10-05 | Resolved: 2022-04-07

## 2022-04-07 PROBLEM — Z34.03 ENCOUNTER FOR SUPERVISION OF NORMAL FIRST PREGNANCY IN THIRD TRIMESTER: Status: RESOLVED | Noted: 2021-08-27 | Resolved: 2022-04-07

## 2022-04-07 PROBLEM — U07.1 COVID-19 AFFECTING PREGNANCY IN SECOND TRIMESTER: Status: RESOLVED | Noted: 2021-08-27 | Resolved: 2022-04-07

## 2022-04-07 PROBLEM — B95.1 POSITIVE GBS TEST: Status: RESOLVED | Noted: 2021-10-07 | Resolved: 2022-04-07

## 2022-04-07 PROBLEM — O98.512 COVID-19 AFFECTING PREGNANCY IN SECOND TRIMESTER: Status: RESOLVED | Noted: 2021-08-27 | Resolved: 2022-04-07

## 2022-04-07 NOTE — PROGRESS NOTES
Assessment/Plan:  Problem List Items Addressed This Visit     None      Visit Diagnoses     Pelvic pain    -  Primary    Relevant Orders    Urinalysis with microscopic    Urine culture    POCT urine dip (Completed)    Chlamydia/GC amplified DNA by PCR    Ambulatory Referral to Physical Therapy    Pelvic pressure in female        Relevant Orders    Urinalysis with microscopic    Urine culture    POCT urine dip (Completed)    Chlamydia/GC amplified DNA by PCR    UTI symptoms        Relevant Medications    cephalexin (KEFLEX) 500 mg capsule    Dysmenorrhea             Acute simple cystitis is suspected based on signs and symptoms  For most women with suspected simple acute cystitis, no additional testing is warranted to make the diagnosis  However, it  may be useful to obtain a urinalysis if diagnosis is unclear or if with complicated UTI and also to obtain a culture to check for antibiotic resistance  Typically acute cystitis is treated with empiric antibiotics  Patient was prescribed with first-line antibiotics  Discussed that the symptoms should respond within 48 hours  If patient has persistent symptoms after 48-72 hours of antimicrobial therapy, she was advised to call or if with back pain or fevers  Patient inquired about pelvic floor physical therapy  Since she is postpartum, she was referred due to complaints of pelvic pain and low back pain  Discussed with patient dysmenorrhea and treatment options including ibuprofen and oral contraceptive pills  She states she has ibuprofen and declined contraception  Advised patient to continue to monitor symptoms, menstrual count 0 and to call if with worsening symptoms  Discussed with patient other differential diagnosis such as endometriosis and potential need for additional evaluation such as laparoscopy  In the meantime, we will follow up with urine culture results and call patient  Gonorrhea and chlamydia testing was also collected      Pelvic ultrasound results normal and discussed with patient  Follow-up in 2 months for annual gyn exam and follow-up of symptoms  Subjective   Patient ID: Calderon Perales is a 19 y o  female  Patient is here for a problem visit  Chief Complaint   Patient presents with    Menstrual Problem     Patient with c/o pressure and pain while on her period  Pt has had 2 cycles since delivery  She is 6 months postpartum  Breastfeeding  She uses condoms for contraception  No h/o STD, partner x 2 years,  Periods started Feb 3, 2022    She has been having pain with her periods for 3 days  Pain last for about 3 days after period starts, 8/10 with cycles, has a a hard time standing  She states periods are heavier, uses about 4-5 tampons/day, lasting for 5-6 days  She has not been taking medication for pain  Her cycles ate 37 days long    She had sharp pain with urination 2 days ago  She was also having frequency  She has no hematuria  She had some back pain but no fevers  She has no vaginal discharge/odor  She had an US last week that was normal     She was never on OCPs and declines this  Menstrual History:  OB History        1    Para   1    Term   1            AB   0    Living   1       SAB   0    IAB   0    Ectopic        Multiple   0    Live Births   1                Menarche age: 13  Patient's last menstrual period was 2022 (exact date)           Past Medical History:   Diagnosis Date    Depression        Past Surgical History:   Procedure Laterality Date    FOOT SURGERY      fracture from gymnastics       Social History     Tobacco Use    Smoking status: Never Smoker    Smokeless tobacco: Never Used   Vaping Use    Vaping Use: Never used   Substance Use Topics    Alcohol use: Not Currently    Drug use: Never        No Known Allergies      Current Outpatient Medications:     Prenatal Multivit-Min-Fe-FA (PRE-SYLVIE PO), Take by mouth, Disp: , Rfl:     cephalexin (KEFLEX) 500 mg capsule, Take 1 capsule (500 mg total) by mouth every 12 (twelve) hours for 7 days, Disp: 14 capsule, Rfl: 0      Review of Systems   Constitutional: Negative  HENT: Negative  Eyes: Negative  Respiratory: Negative  Cardiovascular: Negative  Gastrointestinal: Negative  Endocrine: Negative  Genitourinary:        As noted in HPI   Musculoskeletal: Negative  Skin: Negative  Allergic/Immunologic: Negative  Neurological: Negative  Hematological: Negative  Psychiatric/Behavioral: Negative  /70 (BP Location: Right arm, Patient Position: Sitting, Cuff Size: Adult)   Pulse 76   Temp 97 7 °F (36 5 °C) (Tympanic)   Wt 70 8 kg (156 lb)   LMP 03/12/2022 (Exact Date)   BMI 27 63 kg/m²       Physical Exam  Constitutional:       General: She is not in acute distress  Appearance: She is well-developed  Genitourinary:      Bladder and urethral meatus normal       No lesions in the vagina  Right Labia: No rash, tenderness, lesions, skin changes or Bartholin's cyst      Left Labia: No tenderness, lesions, skin changes, Bartholin's cyst or rash  No vaginal discharge, erythema, tenderness or bleeding  No vaginal prolapse present  No vaginal atrophy present  Right Adnexa: not tender, not full and no mass present  Left Adnexa: not tender, not full and no mass present  No cervical polyp  Uterus is not enlarged or tender  No uterine mass detected  Pelvic exam was performed with patient in the lithotomy position  Rectum:      No tenderness  Cardiovascular:      Rate and Rhythm: Normal rate  Pulmonary:      Effort: Pulmonary effort is normal    Abdominal:      General: There is no distension  Palpations: Abdomen is soft  Tenderness: There is no abdominal tenderness  Neurological:      Mental Status: She is alert and oriented to person, place, and time  Skin:     General: Skin is warm and dry  Psychiatric:         Behavior: Behavior normal

## 2022-04-07 NOTE — PATIENT INSTRUCTIONS
Pelvic Pain   WHAT YOU NEED TO KNOW:   Pelvic pain may be caused by a number of conditions, such as irritable bowel syndrome, appendicitis, or constipation  A urinary tract infection, prostate inflammation, menstrual cramps, or kidney stones can also cause pelvic pain  You may have pain on one or both sides of your pelvis  Pelvic pain can develop if you have trauma to your pelvis or if you sit or stand for a long time  DISCHARGE INSTRUCTIONS:   Medicines: You may need any of the following:  · NSAIDs , such as ibuprofen, help decrease swelling, pain, and fever  This medicine is available with or without a doctor's order  NSAIDs can cause stomach bleeding or kidney problems in certain people  If you take blood thinner medicine, always ask your healthcare provider if NSAIDs are safe for you  Always read the medicine label and follow directions  · Prescription pain medicine  may be given  Ask how to take this medicine safely  · Birth control medicines  may help decrease pain in women  · Take your medicine as directed  Contact your healthcare provider if you think your medicine is not helping or if you have side effects  Tell him or her if you are allergic to any medicine  Keep a list of the medicines, vitamins, and herbs you take  Include the amounts, and when and why you take them  Bring the list or the pill bottles to follow-up visits  Carry your medicine list with you in case of an emergency  Call 911 for any of the following:   · You have severe chest pain and sudden trouble breathing  Contact your healthcare provider if:   · You have a fever  · You have nausea, vomiting, or diarrhea  · Your pain does not go away, or it gets worse, even after treatment  · You have numbness in your legs or toes  · You have heavy or unusual vaginal bleeding  · You have questions or concerns about your condition or care  Manage your pelvic pain:   · Keep a pain record    Write down when your pain happens and how severe it is  Include any other symptoms you have with your pain  A record will help you keep track of pain cycles  Bring the record with you to your follow-up visits  It may also help your healthcare provider find out what is causing your pain  · Learn ways to relax  Deep breathing, meditation, and relaxation techniques can help decrease your pain  When you are tense, your pain may increase  · Treat or prevent constipation  Drink liquids as directed  You may need to drink more liquid than usual  Ask your healthcare provider how much liquid to drink each day  Eat high-fiber foods  High-fiber foods include fruit, vegetables, whole-grain breads and cereals, and beans  You may need to change the foods you eat if you have irritable bowel syndrome  Follow up with your healthcare provider as directed: You may need physical therapy  You may need to see an orthopedic specialist  Write down your questions so you remember to ask them during your visits  © [employer] 2022 Information is for End User's use only and may not be sold, redistributed or otherwise used for commercial purposes  All illustrations and images included in CareNotes® are the copyrighted property of [employer]  or [address] [place] [address]  The above information is an  only  It is not intended as medical advice for individual conditions or treatments  Talk to your doctor, nurse or pharmacist before following any medical regimen to see if it is safe and effective for you  Dysmenorrhea   WHAT YOU NEED TO KNOW:   Dysmenorrhea is painful menstrual cramps at or around the time of your monthly period  DISCHARGE INSTRUCTIONS:   Medicines: You may need any of the following:  · NSAIDs  help decrease swelling, pain, and fever  This medicine is available with or without a doctor's order  NSAIDs can cause stomach bleeding or kidney problems in certain people   If you take blood thinner medicine, always ask your healthcare provider if NSAIDs are safe for you  Always read the medicine label and follow directions  · Birth control medicine  may help decrease your pain  This medicine may be birth control pills or an IUD that does not contain copper  · Take your medicine as directed  Contact your healthcare provider if you think your medicine is not helping or if you have side effects  Tell him or her if you are allergic to any medicine  Keep a list of the medicines, vitamins, and herbs you take  Include the amounts, and when and why you take them  Bring the list or the pill bottles to follow-up visits  Carry your medicine list with you in case of an emergency  Eat low-fat foods: Increase the amount of vegetables and raw seeds you eat  Ask your healthcare provider if you should take vitamin B or magnesium supplements  These will help decrease your pain  Do not eat dairy products or eggs  Apply heat:  Apply heat on your lower abdomen for 20 to 30 minutes every 2 hours for as many days as directed  Heat helps decrease pain and muscle spasms  Manage your stress:  Stress can make your symptoms worse  Try relaxation exercises, such as deep breathing  Exercise regularly:  Ask your healthcare provider about the best exercise plan for you  Exercise can help decrease pain  Keep a record of your pain:  Write down when your pain and periods start and stop  Bring the record with you to your follow-up visits  Do not smoke:  Avoid others who smoke  If you smoke, it is never too late to quit  Smoking can increase your risk for dysmenorrhea  Ask your healthcare provider for information if you need help quitting  Follow up with your healthcare provider or OB-GYN as directed:  Write down your questions so you remember to ask them during your visits  Contact your healthcare provider or OB-GYN if:   · You have anxiety or feel depressed      · Your periods are early, late, or more painful than usual     · You have questions or concerns about your condition or care  Return to the emergency department if:   · You have severe pain  · You have heavy vaginal bleeding and you feel faint  · You have sudden chest pain and trouble breathing  © Copyright [employer] 2022 Information is for End User's use only and may not be sold, redistributed or otherwise used for commercial purposes  All illustrations and images included in CareNotes® are the copyrighted property of [employer]  or [facility] Sandie Francois  The above information is an  only  It is not intended as medical advice for individual conditions or treatments  Talk to your doctor, nurse or pharmacist before following any medical regimen to see if it is safe and effective for you

## 2022-04-08 ENCOUNTER — OFFICE VISIT (OUTPATIENT)
Dept: OBGYN CLINIC | Facility: CLINIC | Age: 22
End: 2022-04-08
Payer: COMMERCIAL

## 2022-04-08 VITALS
HEART RATE: 76 BPM | TEMPERATURE: 97.7 F | SYSTOLIC BLOOD PRESSURE: 110 MMHG | BODY MASS INDEX: 27.63 KG/M2 | WEIGHT: 156 LBS | DIASTOLIC BLOOD PRESSURE: 70 MMHG

## 2022-04-08 DIAGNOSIS — R39.9 UTI SYMPTOMS: ICD-10-CM

## 2022-04-08 DIAGNOSIS — N94.6 DYSMENORRHEA: ICD-10-CM

## 2022-04-08 DIAGNOSIS — R10.2 PELVIC PAIN: Primary | ICD-10-CM

## 2022-04-08 DIAGNOSIS — R10.2 PELVIC PRESSURE IN FEMALE: ICD-10-CM

## 2022-04-08 LAB
BACTERIA UR QL AUTO: ABNORMAL /HPF
BILIRUB UR QL STRIP: NEGATIVE
BV WHIFF TEST VAG QL: NORMAL
CLARITY UR: ABNORMAL
CLUE CELLS SPEC QL WET PREP: NORMAL
COLOR UR: YELLOW
GLUCOSE UR STRIP-MCNC: NEGATIVE MG/DL
HGB UR QL STRIP.AUTO: NEGATIVE
HYALINE CASTS #/AREA URNS LPF: ABNORMAL /LPF
KETONES UR STRIP-MCNC: NEGATIVE MG/DL
LEUKOCYTE ESTERASE UR QL STRIP: ABNORMAL
MUCOUS THREADS UR QL AUTO: ABNORMAL
NITRITE UR QL STRIP: POSITIVE
NON-SQ EPI CELLS URNS QL MICRO: ABNORMAL /HPF
PH SMN: 4.5 [PH]
PH UR STRIP.AUTO: 6 [PH]
PROT UR STRIP-MCNC: ABNORMAL MG/DL
RBC #/AREA URNS AUTO: ABNORMAL /HPF
SL AMB  POCT GLUCOSE, UA: ABNORMAL
SL AMB LEUKOCYTE ESTERASE,UA: ABNORMAL
SL AMB POCT BILIRUBIN,UA: ABNORMAL
SL AMB POCT BLOOD,UA: ABNORMAL
SL AMB POCT CLARITY,UA: ABNORMAL
SL AMB POCT COLOR,UA: ABNORMAL
SL AMB POCT KETONES,UA: ABNORMAL
SL AMB POCT NITRITE,UA: ABNORMAL
SL AMB POCT PH,UA: 5
SL AMB POCT SPECIFIC GRAVITY,UA: 1.03
SL AMB POCT URINE PROTEIN: 30
SL AMB POCT UROBILINOGEN: 0.2
SP GR UR STRIP.AUTO: 1.03 (ref 1–1.03)
T VAGINALIS VAG QL WET PREP: NORMAL
UROBILINOGEN UR STRIP-ACNC: <2 MG/DL
WBC #/AREA URNS AUTO: ABNORMAL /HPF
YEAST VAG QL WET PREP: NORMAL

## 2022-04-08 PROCEDURE — 87077 CULTURE AEROBIC IDENTIFY: CPT | Performed by: OBSTETRICS & GYNECOLOGY

## 2022-04-08 PROCEDURE — 81001 URINALYSIS AUTO W/SCOPE: CPT | Performed by: OBSTETRICS & GYNECOLOGY

## 2022-04-08 PROCEDURE — 99214 OFFICE O/P EST MOD 30 MIN: CPT | Performed by: OBSTETRICS & GYNECOLOGY

## 2022-04-08 PROCEDURE — 81002 URINALYSIS NONAUTO W/O SCOPE: CPT | Performed by: OBSTETRICS & GYNECOLOGY

## 2022-04-08 PROCEDURE — 87186 SC STD MICRODIL/AGAR DIL: CPT | Performed by: OBSTETRICS & GYNECOLOGY

## 2022-04-08 PROCEDURE — 87210 SMEAR WET MOUNT SALINE/INK: CPT | Performed by: OBSTETRICS & GYNECOLOGY

## 2022-04-08 PROCEDURE — 87086 URINE CULTURE/COLONY COUNT: CPT | Performed by: OBSTETRICS & GYNECOLOGY

## 2022-04-08 PROCEDURE — 87491 CHLMYD TRACH DNA AMP PROBE: CPT | Performed by: OBSTETRICS & GYNECOLOGY

## 2022-04-08 PROCEDURE — 87591 N.GONORRHOEAE DNA AMP PROB: CPT | Performed by: OBSTETRICS & GYNECOLOGY

## 2022-04-08 RX ORDER — CEPHALEXIN 500 MG/1
500 CAPSULE ORAL EVERY 12 HOURS SCHEDULED
Qty: 14 CAPSULE | Refills: 0 | Status: SHIPPED | OUTPATIENT
Start: 2022-04-08 | End: 2022-04-15

## 2022-04-09 LAB
C TRACH DNA SPEC QL NAA+PROBE: NEGATIVE
N GONORRHOEA DNA SPEC QL NAA+PROBE: NEGATIVE

## 2022-04-10 LAB
BACTERIA UR CULT: ABNORMAL

## 2022-04-15 ENCOUNTER — EVALUATION (OUTPATIENT)
Dept: PHYSICAL THERAPY | Facility: CLINIC | Age: 22
End: 2022-04-15

## 2022-04-15 DIAGNOSIS — K59.00 CONSTIPATION, UNSPECIFIED CONSTIPATION TYPE: ICD-10-CM

## 2022-04-15 DIAGNOSIS — R10.2 PELVIC PAIN: ICD-10-CM

## 2022-04-15 DIAGNOSIS — M62.89 PELVIC FLOOR DYSFUNCTION: Primary | ICD-10-CM

## 2022-04-15 DIAGNOSIS — N39.3 STRESS INCONTINENCE: ICD-10-CM

## 2022-04-15 PROCEDURE — 97161 PT EVAL LOW COMPLEX 20 MIN: CPT

## 2022-04-15 NOTE — PROGRESS NOTES
PT Evaluation     Today's date: 4/15/2022  Patient name: Magali Garza  : 2000  MRN: [id number]  Referring provider: Nara Kenney MD  Dx:   Encounter Diagnosis     ICD-10-CM    1  Pelvic floor dysfunction  M62 89 PT plan of care cert/re-cert   2  Stress incontinence  N39 3 PT plan of care cert/re-cert   3  Pelvic pain  R10 2 PT plan of care cert/re-cert   4  Constipation, unspecified constipation type  K59 00        Start Time: 1100  Stop Time: 1200  Total time in clinic (min): 60 minutes    Assessment  Assessment details: Patient is a 20 yo female presenting to pelvic floor physical therapy with symptoms consistent with stress incontinence, pelvic floor dysfunction and constipation that started about 6 months ago  Patient is 6 months post partum  Patient presents with the following decreased hip and core strength, poor posture, decreased endurance and pain with increased standing/walking  Patient also presents with incontinence with sneezing as well as straining with bowel movements  Due to these impairments the patient is unable to fully participate in work, ADLs and recreational activities without pelvic pain and incontinence  PT educated the patient on the pelvic floor anatomy and function as well as bathroom mechanics and bladder logs  PT will address the noted impairments by performing hip, core and pelvic floor strengthening, stretching, balance, biofeedback, functional activities and manual techniques to allow the patient to return to her PLOF  PT recommended 1x/week for 8-10 weeks c a good prognosis 2* PLOF  JUN Salinas was present during the session  Patient provided verbal consent for Ulisses Cabrera to be in the session                  Impairments: abnormal or restricted ROM, impaired physical strength, lacks appropriate home exercise program, pain with function, poor posture  and poor body mechanics  Other impairment: stress incontinence, constipation    Symptom irritability: lowUnderstanding of Dx/Px/POC: good   Prognosis: good    Goals  STG: In four weeks the patient will:    1  Be (I) with her HEP  2  Increase hip and core strength to 4+/5 MMT score to assist c functional activities  3  Complete daily voiding and bowel log  4  Perform x10 diaphragmatic breathes without cues  5  Perform x 10 Kegels with a 4-5 second hold with proper breathing and relaxation of the pelvic floor muscles  6  Complete a fiber/ food log to assist c bowel movements  7  Use a squatty potty for bowel movements  8  Perform toilet sits after each meal to assist c a good bowel routine  LTG: In eight weeks, the patient will:    1  Perform x10 TA contractions with proper activation and no cues provided by PT  2   Void without straining in the proper body mechanics  3  tolerating speculum for well woman exam with minimal to no pain post    4  Increase hip and core strength to 5/5 MMT score to assist c prolonged activities  5  Sneeze without incontinence  6  Walk 1 mile without pelvic pain  7  Stand for 30-60 mins without pelvic pain  8  Have less pain during her period         Plan  Patient would benefit from: skilled physical therapy and women's health eval  Planned modality interventions: biofeedback, cryotherapy and thermotherapy: hydrocollator packs  Planned therapy interventions: abdominal trunk stabilization, joint mobilization, manual therapy, massage, Kenyon taping, balance, neuromuscular re-education, patient education, postural training, strengthening, stretching, therapeutic activities, therapeutic exercise, transfer training, home exercise program, functional ROM exercises, flexibility, breathing training and body mechanics training  Frequency: 1x week  Duration in visits: 10  Duration in weeks: 10  Plan of Care beginning date: 4/15/2022  Plan of Care expiration date: 6/24/2022  Treatment plan discussed with: patient        PT Pelvic Floor Subjective:   History of Present Illness:   Patient is currently 6 months post partum after a vaginal delivery of a baby girl  Patient noted during the pregnancy she has increased hip pain towards the end  She also noted that she had nausea for a majority of her pregnancy along with COVID  Patient noted after she delivered she did not have an urge to void  This lasted about two weeks  Patient noted around 2 months post partum she has increased pelvic pain and a feeling that her pelvic floor was prolapsing  Patient noted this occurred when she stood or was on her feet too much  Patient noted the pelvic pain did decrease, however after 4 months when she received her period again the pain started again  Patient noted she is still currently breastfeeding  Patient noted currently she has the increased pelvic pain when ever she has her period and it decreases when her period is done  Patient noted last week she was dx with a UTI for the first time in her lift and feels a lot better after being on the anti-biotics  Patient noted no numbness or tingling  Recurrent probem    Quality of life: good    Social Support:     Lives in:  Multiple-level home    Lives with:  Spouse and young children    Relationship status: /committed    Work status: employed full time (Desk job)    Life stress level: 7    History of Depression: yes  Hand dominance:  Right  Diet and Exercise:      Exercise type: walking    wants to get back to walking at least a mile    Exercise frequency: never    Not exercising due to: lack of time  OB/ gyn History    Gestational History:     Prior Pregnancy: Yes      Number of prior pregnancies: 1    Number of term pregnancies: 1    Delivery Type: vaginal delivery      Number of vaginal deliveries: 1    Delivery Complications:  First degree tear to the L side  18 hours of labor, pushed for 7 pushes       Menstrual History:    Date of last menstrual period: 3/12/2022    Menstrual irregularities irregular menses    Painful periods:  Difficulty managing menstrual pain (Heavier than before pregnancy)    Tolerates tampons: yes  Bladder Function:     Voiding Difficulties positive for: urgency and incomplete emptying      Voiding Difficulties negative for: straining      Voiding Difficulties comments:     Voiding frequency: every 1-2 hours    Urinary leakage: urine leakage    Urinary leakage aggravated by: sneezing    Urinary leakage not aggravated by: coughing    Nocturia (episodes per night): 1    Painful urination: No      Fluid Intake Type: Water and coffee    Intake (ounces): Water: 90, Coffee: 16,   Incontinence Management:     Pads/Diaper Use:  None    Patient has attempted at least 4 weeks of independent pelvic floor strengthening exercises without a resolution of symptoms  Bowel Function:     Voiding DIfficulties: painful defecating and constipation      Bowel frequency: every 3 days    [place] Stool Scale: type 3 and type 5    Stool softener use: no stool softeners    Uses "squatty potty": no Squatty Potty  Sexual Function:     Sexually Active:  Sexually active    Pain during intercourse: No    Pain:     Current pain ratin    At best pain ratin    At worst pain ratin    Location:  Inner pelvic area    Onset:  4-6 months ago    Quality:  Pressure    Aggravating factors:  Walking and sit to stand transition    Pain duration: Start of period to the first 3 days      Progression:  Worsening  Patient Goals:     Patient goals for therapy:  Fully empty bladder or bowels, improved pain management, improved quality of life, relaxation, return to sport/leisure activities, improved comfort, improved bladder or bowel function, decreased pain and decreased interpersonal problems    Other patient goals:  "to walk a mile without pain " "to run, jump without incontinence " "to sneeze without incontinence "       Objective     Postural Observations  Seated posture: fair  Standing posture: fair        Strength/Myotome Testing     Left Hip   Planes of Motion Flexion: 4  Extension: 4  Abduction: 4  Adduction: 4    Right Hip   Planes of Motion   Flexion: 4  Extension: 4  Abduction: 4  Adduction: 4    Left Knee   Flexion: 4  Extension: 4+    Right Knee   Flexion: 4  Extension: 4+    Left Ankle/Foot   Dorsiflexion: 4+  Plantar flexion: 4+    Right Ankle/Foot   Dorsiflexion: 4+  Plantar flexion: 4+  Pelvic Floor Exam     Diastatis   Palpation of linea alba: To be assessed in upcoming sessions  General Perineum Exam:     General perineum exam comments: To be assessed in upcoming sessions  Visual Inspection of Perineum:   PFM Contraction Comments: To be assessed in upcoming sessions  Pelvic Floor Muscle Exam       PERFECT Score   Right power: To be assessed in upcoming sessions  Left power: To be assessed in upcoming sessions                  Precautions: hx of depression, post partum      Manuals 4/15            Internal exam nv            External exam nv            Abdominal assessment nv                         Neuro Re-Ed             Pelvic floor anatomy and function, bladder logs and bathroom mechanics edu MW  Fiber chart            TA nv            kegel nv            Diaphragmatic breathing nv                                                   Ther Ex             Open books nv            Butterfly stretch nv            Piriformis stetch nv            Hip add nv            Hip abd nv                                                   Ther Activity                                       Gait Training                                       Modalities

## 2022-04-20 ENCOUNTER — OFFICE VISIT (OUTPATIENT)
Dept: PHYSICAL THERAPY | Facility: CLINIC | Age: 22
End: 2022-04-20

## 2022-04-20 ENCOUNTER — APPOINTMENT (OUTPATIENT)
Dept: PHYSICAL THERAPY | Facility: CLINIC | Age: 22
End: 2022-04-20

## 2022-04-20 DIAGNOSIS — M62.89 PELVIC FLOOR DYSFUNCTION: Primary | ICD-10-CM

## 2022-04-20 DIAGNOSIS — K59.00 CONSTIPATION, UNSPECIFIED CONSTIPATION TYPE: ICD-10-CM

## 2022-04-20 DIAGNOSIS — R10.2 PELVIC PAIN: ICD-10-CM

## 2022-04-20 DIAGNOSIS — N39.3 STRESS INCONTINENCE: ICD-10-CM

## 2022-04-20 PROCEDURE — 97112 NEUROMUSCULAR REEDUCATION: CPT

## 2022-04-20 NOTE — PROGRESS NOTES
Daily Note     Today's date: 2022  Patient name: Roxanna Marie  : 2000  MRN: [id number]  Referring provider: Deb Parker MD  Dx:   Encounter Diagnosis     ICD-10-CM    1  Pelvic floor dysfunction  M62 89    2  Stress incontinence  N39 3    3  Pelvic pain  R10 2    4  Constipation, unspecified constipation type  K59 00        Start Time: 08  Stop Time: 910  Total time in clinic (min): 45 minutes    Subjective: Patient noted that she started her period on Monday  Patient noted some pelvic pain with prolonged standing  Patient noted that she is holding her urine for too long  Objective: See treatment diary below  ABDOMINAL ASSESSMENT:  Diastasis recti: 1 5 fingers above umbilicus, 2 fingers at umbilicus, none present below umbilicus  Wide rib angle and lateral expansion with upper abdominal crunch    Assessment: PT held internal and external exam of the pelvic floor due to having her period  PT will examine next session  PT did assess if diastasis recti was present  Patient did present with diastasis recti at the umbilicus and above the umbilicus  She also presented with a wide rib angle which places her diaphragm in a dysfunctional position  Patient performed various stretches and strengthening exercises with min VCs  Patient also performed upper abdominal crunches to assist c rib angle  Patient provided verbal consent for abdominal assessment  PT added to her HEP as well as educated her on the fiber chart  Patient would benefit from continued PT to allow the patient to return to her PLOF  Plan: Continue per plan of care        Precautions: hx of depression, post partum      Manuals 4/15 4/20           Internal exam nv nv           External exam nv nv           Abdominal assessment nv MW                        Neuro Re-Ed             Pelvic floor anatomy and function, bladder logs and bathroom mechanics edu ALBINO  Fiber chart MW  Fiber chart           TA vincent kaur Diaphragmatic breathing nv Supine  2x10           DB + Upper abdominal crunch with PT or pt overpressure  x15                                     Ther Ex             Open books nv 5x15" ea           Butterfly stretch nv 3x30"           Piriformis stetch nv 3x30"           Hip add nv x20  5" hold           Hip abd nv GTB  x20  5" hold           Happy baby stretch  5x20"            Downward dog stretch  5x20"           Cat camel  x15 ea           Ther Activity                                       Gait Training                                       Modalities

## 2022-04-26 ENCOUNTER — OFFICE VISIT (OUTPATIENT)
Dept: PHYSICAL THERAPY | Facility: CLINIC | Age: 22
End: 2022-04-26

## 2022-04-26 DIAGNOSIS — K59.00 CONSTIPATION, UNSPECIFIED CONSTIPATION TYPE: ICD-10-CM

## 2022-04-26 DIAGNOSIS — M62.89 PELVIC FLOOR DYSFUNCTION: Primary | ICD-10-CM

## 2022-04-26 DIAGNOSIS — R10.2 PELVIC PAIN: ICD-10-CM

## 2022-04-26 DIAGNOSIS — N39.3 STRESS INCONTINENCE: ICD-10-CM

## 2022-04-26 PROCEDURE — 97112 NEUROMUSCULAR REEDUCATION: CPT

## 2022-04-26 NOTE — PROGRESS NOTES
Daily Note     Today's date: 2022  Patient name: Jared Pelayo  : 2000  MRN: [id number]  Referring provider: Ernestina Field MD  Dx:   Encounter Diagnosis     ICD-10-CM    1  Pelvic floor dysfunction  M62 89    2  Stress incontinence  N39 3    3  Pelvic pain  R10 2    4  Constipation, unspecified constipation type  K59 00        Start Time: 1245  Stop Time: 1330  Total time in clinic (min): 45 minutes    Subjective: Patient noted L anterior hip pain started this past Saturday that is a 3/10 pain  Patient noted walking really increases her pain  Patient noted she has had about 3-8 grams a fiber a day with one bowel movement this past week  Objective: See treatment diary below      Assessment: PT educated the patient on eating 10 grams of fiber this next week and drinking more water to assist c a bowel movement  PT performed hip flexor release as well as mobilizations to assist c pain levels  Patient noted improvements post session  Patient performed hip adduction at 50% with no pain  PT edu about her HEP  Patient would benefit from continued PT to allow the patient to return to her PLOF  Plan: Continue per plan of care        Precautions: hx of depression, post partum      Manuals 4/15 4/20 4/26          Internal exam nv nv nv          External exam nv nv nv          ILU massage   nv          Abdominal assessment nv MW           Hip flexor release   MW (L>R)          Hip distraction and flexion mob R   MW  Grade  III          Neuro Re-Ed             Pelvic floor anatomy and function, bladder logs and bathroom mechanics edu MW  Fiber chart MW  Fiber chart 10g fiber  32-50oz water edu          TA nv            kegel nv            Diaphragmatic breathing nv Supine  2x10 Supine  x10          DB + Upper abdominal crunch with PT or pt overpressure  x15 x10                                    Ther Ex             Open books nv 5x15" ea           Butterfly stretch nv 3x30" 3x30"           Piriformis stetch nv 3x30" 3x30" ea          Hip add nv x20  5" hold 2x10  5" hold  50%          Hip abd nv GTB  x20  5" hold GTB  x20  5" hold          Happy baby stretch  5x20"            Downward dog stretch  5x20"           Cat camel  x15 ea           Ther Activity                                       Gait Training                                       Modalities

## 2022-05-06 ENCOUNTER — OFFICE VISIT (OUTPATIENT)
Dept: PHYSICAL THERAPY | Facility: CLINIC | Age: 22
End: 2022-05-06

## 2022-05-06 DIAGNOSIS — K59.00 CONSTIPATION, UNSPECIFIED CONSTIPATION TYPE: ICD-10-CM

## 2022-05-06 DIAGNOSIS — M62.89 PELVIC FLOOR DYSFUNCTION: Primary | ICD-10-CM

## 2022-05-06 DIAGNOSIS — N39.3 STRESS INCONTINENCE: ICD-10-CM

## 2022-05-06 DIAGNOSIS — R10.2 PELVIC PAIN: ICD-10-CM

## 2022-05-06 PROCEDURE — 97112 NEUROMUSCULAR REEDUCATION: CPT

## 2022-05-06 NOTE — PROGRESS NOTES
Daily Note     Today's date: 2022  Patient name: Levar Bush  : 2000  MRN: [id number]  Referring provider: Rasheeda Steen MD  Dx:   Encounter Diagnosis     ICD-10-CM    1  Pelvic floor dysfunction  M62 89    2  Stress incontinence  N39 3    3  Pelvic pain  R10 2    4  Constipation, unspecified constipation type  K59 00        Start Time: 1115  Stop Time: 1200  Total time in clinic (min): 45 minutes    Subjective: Patient noted that she has increased her fiber to 10g per day and used a squatty potty and had 4 bowel movements this past week which is an improvement  Patient noted no incontinence since last session  Objective: See treatment diary below  PERFECT Score: 3/3/3 5  Sensation and reflexes: intact  TTP: L 2nd and 1st layer  Difficult with performing a kegel and bearing down  Moderate cues  Kegel and bearing down flipped at times  Assessment: PT performed an internal and external pelvic floor exam  Patient provided verbal consent for the pelvic floor exam  Patient performed a kegel and a bearing down motion with moderate cues and at times they were reversed  Patient was more successful when PT inserted one finger into the vaginal canal for tactile input  Patient was able to hold for 3 seconds, however she took about 2-3 seconds to relax her pelvic floor  Patient noted some tenderness on the 1st and 2nd layer on the L where potential tearing was from the vaginal delivery  Patient noted no pain post  PT educated the patient on perform kegels at home as well as continuing her fiber intake to assist c bowel movements  Patient would benefit from continued PT to allow the patient to return to her PLOF  Plan: Continue per plan of care        Precautions: hx of depression, post partum      Manuals 4/15 4/20 4/26 5         Internal exam nv nv nv MW         External exam nv nv nv MW         ILU massage   nv          Abdominal assessment nv MW           Hip flexor release   MW (L>R) MW Hip distraction and flexion mob R   MW  Grade  III MW  Grade  III         Neuro Re-Ed             Pelvic floor anatomy and function, bladder logs and bathroom mechanics edu MW  Fiber chart MW  Fiber chart 10g fiber  32-50oz water edu 10g fiber, water edu         TA nv            kegel nv   Supine  2x10  3" hold mod cues         Diaphragmatic breathing nv Supine  2x10 Supine  x10 Supine  2x10         DB + Upper abdominal crunch with PT or pt overpressure  x15 x10                                    Ther Ex             Open books nv 5x15" ea           Butterfly stretch nv 3x30" 3x30"           Piriformis stetch nv 3x30" 3x30" ea          Hip add nv x20  5" hold 2x10  5" hold  50%          Hip abd nv GTB  x20  5" hold GTB  x20  5" hold          Happy baby stretch  5x20"            Downward dog stretch  5x20"           Cat camel  x15 ea           Ther Activity                                       Gait Training                                       Modalities

## 2022-05-11 ENCOUNTER — APPOINTMENT (OUTPATIENT)
Dept: PHYSICAL THERAPY | Facility: CLINIC | Age: 22
End: 2022-05-11

## 2022-05-12 ENCOUNTER — OFFICE VISIT (OUTPATIENT)
Dept: PHYSICAL THERAPY | Facility: CLINIC | Age: 22
End: 2022-05-12

## 2022-05-12 DIAGNOSIS — N39.3 STRESS INCONTINENCE: ICD-10-CM

## 2022-05-12 DIAGNOSIS — R10.2 PELVIC PAIN: ICD-10-CM

## 2022-05-12 DIAGNOSIS — K59.00 CONSTIPATION, UNSPECIFIED CONSTIPATION TYPE: ICD-10-CM

## 2022-05-12 DIAGNOSIS — M62.89 PELVIC FLOOR DYSFUNCTION: Primary | ICD-10-CM

## 2022-05-12 PROCEDURE — 97112 NEUROMUSCULAR REEDUCATION: CPT

## 2022-05-12 NOTE — PROGRESS NOTES
Daily Note     Today's date: 2022  Patient name: Roxanna Marie  : 2000  MRN: [id number]  Referring provider: Deb Parker MD  Dx:   Encounter Diagnosis     ICD-10-CM    1  Pelvic floor dysfunction  M62 89    2  Stress incontinence  N39 3    3  Pelvic pain  R10 2    4  Constipation, unspecified constipation type  K59 00        Start Time: 1045  Stop Time: 1130  Total time in clinic (min): 45 minutes    Subjective: Patient noted a bowel movement every other day with a little more on the harder side  Patient noted that her incontinence has been minimal and has not had any hip pain this past week  Objective: See treatment diary below      Assessment: PT performed hip flexor release to assist c discomfort  Patient noted less tenderness  Patient performed various hip strengthening exercises to assist c stabilization  Patient required min VCs for form and noted increased difficulty on the R when compared to the L  PT added to her HEP  Patient would benefit from continued PT to allow the patient to return to her PLOF  Plan: Continue per plan of care        Precautions: hx of depression, post partum  [id number]       Manuals 4/15 4/20 4/26 5/6 5/12        Internal exam nv nv nv MW         External exam nv nv nv MW         ILU massage   nv          Abdominal assessment nv MW           Hip flexor release   MW (L>R) MW MW        Hip distraction and flexion mob R   MW  Grade  III MW  Grade  III         Neuro Re-Ed             Pelvic floor anatomy and function, bladder logs and bathroom mechanics edu MW  Fiber chart MW  Fiber chart 10g fiber  32-50oz water edu 10g fiber, water edu 10g fiber, water edu        TA nv            kegel nv   Supine  2x10  3" hold mod cues         Diaphragmatic breathing nv Supine  2x10 Supine  x10 Supine  2x10         DB + Upper abdominal crunch with PT or pt overpressure  x15 x10  x10        TA + bridge     2x10        Hip hikes     x15 ea        clams     x15 ea        Ther Ex             Open books nv 5x15" ea           Butterfly stretch nv 3x30" 3x30"   3x30"        Piriformis stetch nv 3x30" 3x30" ea  3x30" ea        Hip add nv x20  5" hold 2x10  5" hold  50%  hold        Hip abd nv GTB  x20  5" hold GTB  x20  5" hold  sidelying hip abd  x15 ea        Happy baby stretch  5x20"    5x20"        Downward dog stretch  5x20"           3 way hip     x20 ea (B)        Cat camel  x15 ea   x15 ea        Ther Activity                                       Gait Training                                       Modalities

## 2022-05-18 ENCOUNTER — APPOINTMENT (OUTPATIENT)
Dept: PHYSICAL THERAPY | Facility: CLINIC | Age: 22
End: 2022-05-18

## 2022-05-19 ENCOUNTER — OFFICE VISIT (OUTPATIENT)
Dept: PHYSICAL THERAPY | Facility: CLINIC | Age: 22
End: 2022-05-19

## 2022-05-19 DIAGNOSIS — K59.00 CONSTIPATION, UNSPECIFIED CONSTIPATION TYPE: ICD-10-CM

## 2022-05-19 DIAGNOSIS — M62.89 PELVIC FLOOR DYSFUNCTION: Primary | ICD-10-CM

## 2022-05-19 DIAGNOSIS — N39.3 STRESS INCONTINENCE: ICD-10-CM

## 2022-05-19 DIAGNOSIS — R10.2 PELVIC PAIN: ICD-10-CM

## 2022-05-19 PROCEDURE — 97112 NEUROMUSCULAR REEDUCATION: CPT

## 2022-05-19 NOTE — PROGRESS NOTES
Daily Note     Today's date: 2022  Patient name: Willy Gauthier  : 2000  MRN: [id number]  Referring provider: Teresita Avila MD  Dx:   Encounter Diagnosis     ICD-10-CM    1  Pelvic floor dysfunction  M62 89    2  Stress incontinence  N39 3    3  Pelvic pain  R10 2    4  Constipation, unspecified constipation type  K59 00        Start Time: 1500  Stop Time: 1555  Total time in clinic (min): 55 minutes    Subjective: Patient has had a bowel movement daily except one day  Patient only had one incontinence episode this past week with a sneeze  Patient noted no pelvic pain this past week  Objective: See treatment diary below      Assessment: PT introduced TA contractions as well as a hip flexor stretch to assist c stabilization and flexibility  Patient required min VCs for form as well as for breathing  PT performed hip flexor release with minimal tightness  Patient continues to improve with strength and stabilization 2* no pain over the past week as well as only one episode of incontinence  Patient is also able to have a bowel movement almost daily which is an improvement  PT added to her HEP  Patient would benefit from continued PT to allow the patient to return to her PLOF  Plan: Continue per plan of care        Precautions: hx of depression, post partum  [facility] Access Code: [email or web address]       Manuals 4/15 4/20 4/26 5/6 5/12 5/19       Internal exam nv nv nv MW         External exam nv nv nv MW         ILU massage   nv          Abdominal assessment nv MW           Hip flexor release   MW (L>R) MW MW MW       Hip distraction and flexion mob R   MW  Grade  III MW  Grade  III         Neuro Re-Ed             Pelvic floor anatomy and function, bladder logs and bathroom mechanics edu MW  Fiber chart MW  Fiber chart 10g fiber  32-50oz water edu 10g fiber, water edu 10g fiber, water edu HEP edu  MW       TA nv     Supine  x15  3" hold       DB + TA + fallouts      x10 ea       DB + TA + marching x10 ea       kegel nv   Supine  2x10  3" hold mod cues         Diaphragmatic breathing nv Supine  2x10 Supine  x10 Supine  2x10  Supine  3x10       DB + Upper abdominal crunch with PT or pt overpressure  x15 x10  x10        TA + bridge     2x10        Hip hikes     x15 ea        clams     x15 ea        Ther Ex             Open books nv 5x15" ea           Butterfly stretch nv 3x30" 3x30"   3x30"        Piriformis stetch nv 3x30" 3x30" ea  3x30" ea 3x30" ea       Hip add nv x20  5" hold 2x10  5" hold  50%  hold        Hip abd nv GTB  x20  5" hold GTB  x20  5" hold  sidelying hip abd  x15 ea        Hip flexor stretch in lunge      3x20" ea       QL lunge stretch      3x20" ea       Happy baby stretch  5x20"    5x20" 5x20":       Downward dog stretch  5x20"           3 way hip     x20 ea (B)        Cat camel  x15 ea   x15 ea        Ther Activity                                       Gait Training                                       Modalities

## 2022-05-25 ENCOUNTER — APPOINTMENT (OUTPATIENT)
Dept: PHYSICAL THERAPY | Facility: CLINIC | Age: 22
End: 2022-05-25

## 2022-05-26 ENCOUNTER — APPOINTMENT (OUTPATIENT)
Dept: PHYSICAL THERAPY | Facility: CLINIC | Age: 22
End: 2022-05-26

## 2022-06-01 ENCOUNTER — APPOINTMENT (OUTPATIENT)
Dept: PHYSICAL THERAPY | Facility: CLINIC | Age: 22
End: 2022-06-01

## 2022-06-03 ENCOUNTER — OFFICE VISIT (OUTPATIENT)
Dept: PHYSICAL THERAPY | Facility: CLINIC | Age: 22
End: 2022-06-03

## 2022-06-03 DIAGNOSIS — N39.3 STRESS INCONTINENCE: ICD-10-CM

## 2022-06-03 DIAGNOSIS — K59.00 CONSTIPATION, UNSPECIFIED CONSTIPATION TYPE: ICD-10-CM

## 2022-06-03 DIAGNOSIS — M62.89 PELVIC FLOOR DYSFUNCTION: Primary | ICD-10-CM

## 2022-06-03 DIAGNOSIS — R10.2 PELVIC PAIN: ICD-10-CM

## 2022-06-03 PROCEDURE — 97112 NEUROMUSCULAR REEDUCATION: CPT

## 2022-06-03 NOTE — PROGRESS NOTES
Daily Note     Today's date: 6/3/2022  Patient name: Asya Piña  : 2000  MRN: [id number]  Referring provider: Zehra Tyson MD  Dx:   Encounter Diagnosis     ICD-10-CM    1  Pelvic floor dysfunction  M62 89    2  Stress incontinence  N39 3    3  Pelvic pain  R10 2    4  Constipation, unspecified constipation type  K59 00        Start Time: 1100  Stop Time: 1145  Total time in clinic (min): 45 minutes    Subjective: Patient noted she only had 3-5/10 pain on the first day of her period rather than the 8-9/10 pain when starting PT  Patient noted that she has less pain throughout the day as well  Objective: See treatment diary below      Assessment: Patient continues to improve with strength and stabilization 2* less pain throughout the day and prior to her period  PT introduced quadruped TA contraction exercises to assist c stabilization during functional activities  Patient noted no increased pain, however did require min VCs for form  PT added to her HEP  Patient would benefit from continued PT to allow the patient to return to her PLOF  Plan: Continue per plan of care        Precautions: hx of depression, post partum  [facility]: [email or web address]       Manuals 4/15 4/20 4/26 5/6 5/12 5/19 6/3      Internal exam nv nv nv MW         External exam nv nv nv MW         ILU massage   nv          Abdominal assessment nv MW           Hip flexor release   MW (L>R) MW MW MW MW      Hip distraction and flexion mob R   MW  Grade  III MW  Grade  III         Neuro Re-Ed             Pelvic floor anatomy and function, bladder logs and bathroom mechanics edu MW  Fiber chart MW  Fiber chart 10g fiber  32-50oz water edu 10g fiber, water edu 10g fiber, water edu HEP edu  MW HEP   edu  MW      TA nv     Supine  x15  3" hold Supine  x20  5" hold      DB + TA + fallouts      x10 ea x10 ea      DB + TA + marching      x10 ea x10 ea      kegel nv   Supine  2x10  3" hold mod cues         TA + Quad alt UEs       x10 Quad serratus punch       x20      TA + Quad alt LEs       x10      Diaphragmatic breathing nv Supine  2x10 Supine  x10 Supine  2x10  Supine  3x10 supine + Quad  3x10 ea      DB + Upper abdominal crunch with PT or pt overpressure  x15 x10  x10        TA + bridge     2x10        Hip hikes     x15 ea        clams     x15 ea        Ther Ex             Open books nv 5x15" ea           Butterfly stretch nv 3x30" 3x30"   3x30"        Piriformis stetch nv 3x30" 3x30" ea  3x30" ea 3x30" ea       Hip add nv x20  5" hold 2x10  5" hold  50%  hold        Hip abd nv GTB  x20  5" hold GTB  x20  5" hold  sidelying hip abd  x15 ea        Hip flexor stretch in lunge      3x20" ea       QL lunge stretch      3x20" ea       Happy baby stretch  5x20"    5x20" 5x20":       Downward dog stretch  5x20"           3 way hip     x20 ea (B)  x20 ea (B)      Cat camel  x15 ea   x15 ea        Ther Activity                                       Gait Training                                       Modalities

## 2022-06-08 ENCOUNTER — APPOINTMENT (OUTPATIENT)
Dept: PHYSICAL THERAPY | Facility: CLINIC | Age: 22
End: 2022-06-08

## 2022-06-15 ENCOUNTER — OFFICE VISIT (OUTPATIENT)
Dept: PHYSICAL THERAPY | Facility: CLINIC | Age: 22
End: 2022-06-15

## 2022-06-15 DIAGNOSIS — R10.2 PELVIC PAIN: ICD-10-CM

## 2022-06-15 DIAGNOSIS — K59.00 CONSTIPATION, UNSPECIFIED CONSTIPATION TYPE: ICD-10-CM

## 2022-06-15 DIAGNOSIS — M62.89 PELVIC FLOOR DYSFUNCTION: Primary | ICD-10-CM

## 2022-06-15 DIAGNOSIS — N39.3 STRESS INCONTINENCE: ICD-10-CM

## 2022-06-15 PROCEDURE — 97112 NEUROMUSCULAR REEDUCATION: CPT

## 2022-06-15 NOTE — PROGRESS NOTES
Daily Note     Today's date: 6/15/2022  Patient name: David Zuluaga  : 2000  MRN: [id number]  Referring provider: James Reid MD  Dx:   Encounter Diagnosis     ICD-10-CM    1  Pelvic floor dysfunction  M62 89    2  Stress incontinence  N39 3    3  Pelvic pain  R10 2    4  Constipation, unspecified constipation type  K59 00        Start Time: 1030  Stop Time: 1115  Total time in clinic (min): 45 minutes    Subjective: Patient noted she breast fed her daughter in the car and moved the wrong way and noted a 6/10 pain in (B) anterior hips  Patient noted the pain went away the next day  Objective: See treatment diary below      Assessment: PT performed hip flexor release to assist c pain levels  Patient presented with increased trigger points on the L when compared to the L  PT educated the patient on performing her HEP to assist c pain levels  Overall, the patient continues to note improvements in pain levels and strength  Due to improvements the patient will be D/C in 1-2 visits  Patient would benefit from continued PT to allow the patient to return to her PLOF  Plan: Continue per plan of care        Precautions: hx of depression, post partum  [facility] Access Code: [email or web address]       Manuals 4/15 4/20 4/26 5/6 5/12 5/19 6/3 6/15     Internal exam nv nv nv MW         External exam nv nv nv MW         ILU massage   nv          Abdominal assessment nv MW           Hip flexor release   MW (L>R) MW MW MW MW MW     Hip distraction and flexion mob R   MW  Grade  III MW  Grade  III         Neuro Re-Ed             Pelvic floor anatomy and function, bladder logs and bathroom mechanics edu MW  Fiber chart MW  Fiber chart 10g fiber  32-50oz water edu 10g fiber, water edu 10g fiber, water edu HEP edu  MW HEP   edu  MW HEP edu  MW     TA nv     Supine  x15  3" hold Supine  x20  5" hold      DB + TA + fallouts      x10 ea x10 ea      DB + TA + marching      x10 ea x10 ea      kegel nv   Supine  2x10  3" hold mod cues         TA + Quad alt UEs       x10 x20     Quad serratus punch       x20 x20     TA + Quad alt LEs       x10 x20     Diaphragmatic breathing nv Supine  2x10 Supine  x10 Supine  2x10  Supine  3x10 supine + Quad  3x10 ea Quad  2x10     DB + Upper abdominal crunch with PT or pt overpressure  x15 x10  x10        TA + bridge     2x10        Hip hikes     x15 ea        clams     x15 ea        Ther Ex             Open books nv 5x15" ea      5x15" ea     Butterfly stretch nv 3x30" 3x30"   3x30"   3x30"     Piriformis stetch nv 3x30" 3x30" ea  3x30" ea 3x30" ea  3x30" ea     Hip add nv x20  5" hold 2x10  5" hold  50%  hold        Hip abd nv GTB  x20  5" hold GTB  x20  5" hold  sidelying hip abd  x15 ea        Hip flexor stretch in lunge      3x20" ea       QL lunge stretch      3x20" ea       Happy baby stretch  5x20"    5x20" 5x20":  5x20"     Downward dog stretch  5x20"           3 way hip     x20 ea (B)  x20 ea (B)      Cat camel  x15 ea   x15 ea        Ther Activity                                       Gait Training                                       Modalities

## 2022-06-22 ENCOUNTER — APPOINTMENT (OUTPATIENT)
Dept: PHYSICAL THERAPY | Facility: CLINIC | Age: 22
End: 2022-06-22

## 2022-06-24 ENCOUNTER — OFFICE VISIT (OUTPATIENT)
Dept: PHYSICAL THERAPY | Facility: CLINIC | Age: 22
End: 2022-06-24

## 2022-06-24 DIAGNOSIS — M62.89 PELVIC FLOOR DYSFUNCTION: Primary | ICD-10-CM

## 2022-06-24 DIAGNOSIS — K59.00 CONSTIPATION, UNSPECIFIED CONSTIPATION TYPE: ICD-10-CM

## 2022-06-24 DIAGNOSIS — R10.2 PELVIC PAIN: ICD-10-CM

## 2022-06-24 DIAGNOSIS — N39.3 STRESS INCONTINENCE: ICD-10-CM

## 2022-06-24 PROCEDURE — 97112 NEUROMUSCULAR REEDUCATION: CPT

## 2022-06-24 NOTE — PROGRESS NOTES
PT Re-Evaluation  and PT Discharge    Today's date: 2022  Patient name: Shania Bach  : 2000  MRN: [id number]  Referring provider: Will Burleson MD  Dx:   Encounter Diagnosis     ICD-10-CM    1  Pelvic floor dysfunction  M62 89    2  Stress incontinence  N39 3    3  Pelvic pain  R10 2    4  Constipation, unspecified constipation type  K59 00        Start Time: 1115  Stop Time: 1150  Total time in clinic (min): 35 minutes    Assessment  Assessment details: Patient is a 18 yo female presenting to pelvic floor physical therapy with symptoms consistent with stress incontinence, pelvic floor dysfunction and constipation that started about 6 months ago  Since starting physical therapy the patient has improved with strength, endurance, posture and incontinence  Patient is now able to have a bowel movement almost daily that does not require straining  Patient feels (I) with her HEP and ADLs  Due to noted improvements, the patient will be D/C from PT with a HEP  PT and patient agree with POC  Understanding of Dx/Px/POC: good   Prognosis: good    Goals  STG: In four weeks the patient will:    1  Be (I) with her HEP  (MET)  2  Increase hip and core strength to 4+/5 MMT score to assist c functional activities (MET)  3  Complete daily voiding and bowel log  (MET)  4  Perform x10 diaphragmatic breathes without cues (MET)  5  Perform x 10 Kegels with a 4-5 second hold with proper breathing and relaxation of the pelvic floor muscles  (MET)  6  Complete a fiber/ food log to assist c bowel movements  (MET)  7  Use a squatty potty for bowel movements  (MET)  8  Perform toilet sits after each meal to assist c a good bowel routine  (MET)      LTG: In eight weeks, the patient will:    1  Perform x10 TA contractions with proper activation and no cues provided by PT  (MET)  2    Void without straining in the proper body mechanics  (MET)  3  tolerating speculum for well woman exam with minimal to no pain post  (MET)  4  Increase hip and core strength to 5/5 MMT score to assist c prolonged activities  (in progress)  5  Sneeze without incontinence  (MET)  6  Walk 1 mile without pelvic pain  (MET)  7  Stand for 30-60 mins without pelvic pain  (MET)  8  Have less pain during her period  (MET)      Plan  Therapy options: D/C from PT  Frequency: 1x week  Duration in visits: 1  Plan of Care beginning date: 6/24/2022  Plan of Care expiration date: 6/24/2022  Treatment plan discussed with: patient        PT Pelvic Floor Subjective:   History of Present Illness:   Patient noted that she had no incontinence with sneezing or coughing  Patient noted that she now only has minimal pelvic pain with her period for one day  Patient is able to walk without pain  Patient feels (I) with her HEP  Patient also noted she is now able to have a bowel movement almost daily that does not require straining  Recurrent probem    Quality of life: good    Social Support:     Lives in:  Multiple-level home    Lives with:  Spouse and young children    Relationship status: /committed    Work status: employed full time (Desk job)    Life stress level: 7    History of Depression: yes  Hand dominance:  Right  Diet and Exercise:      Exercise type: walking    wants to get back to walking at least a mile    Exercise frequency: 2-3 times per week    Not exercising due to: lack of time  OB/ gyn History    Gestational History:     Prior Pregnancy: Yes      Number of prior pregnancies: 1    Number of term pregnancies: 1    Delivery Type: vaginal delivery      Number of vaginal deliveries: 1    Delivery Complications:  First degree tear to the L side  18 hours of labor, pushed for 7 pushes       Menstrual History:    Date of last menstrual period: 5/23/2022    Menstrual irregularities irregular menses    Painful periods:  Difficulty managing menstrual pain (Heavier than before pregnancy)    Tolerates tampons: yes  Bladder Function:     Voiding Difficulties negative for: urgency, straining and incomplete emptying      Voiding Difficulties comments:     Voiding frequency: every 1-2 hours    Urinary leakage: no urine leakage    Urinary leakage not aggravated by: coughing and sneezing    Nocturia (episodes per night): 1    Painful urination: No      Fluid Intake Type: Water and coffee    Intake (ounces): Water: 90, Coffee: 16,   Incontinence Management:     Pads/Diaper Use:  None    Patient has attempted at least 4 weeks of independent pelvic floor strengthening exercises without a resolution of symptoms  Bowel Function:     Bowel frequency: daily and every 2 days (almost everyday)    Sierra Stool Scale: type 5 and type 4    Stool softener use: no stool softeners    Uses "squatty potty": no Squatty Potty  Sexual Function:     Sexually Active:  Sexually active    Pain during intercourse: No    Pain:     Current pain ratin    At best pain ratin    At worst pain ratin    Location:  Inner pelvic area    Onset:  4-6 months ago    Quality:  Pressure    Pain duration: Start of period to the first 3 days      Progression:  Improved  Patient Goals:     Patient goals for therapy:  Fully empty bladder or bowels, improved pain management, improved quality of life, relaxation, return to sport/leisure activities, improved comfort, improved bladder or bowel function, decreased pain and decreased interpersonal problems    Other patient goals:  "to walk a mile without pain " (MET) "to run, jump without incontinence " (MET) "to sneeze without incontinence " (MET)      Objective     Postural Observations  Seated posture: fair  Standing posture: fair        Strength/Myotome Testing     Left Hip   Planes of Motion   Flexion: 4+  Extension: 4+  Abduction: 4+  Adduction: 4+    Right Hip   Planes of Motion   Flexion: 4+  Extension: 4+  Abduction: 4+  Adduction: 4+    Left Knee   Flexion: 4+  Extension: 4+    Right Knee   Flexion: 4+  Extension: 4+    Left Ankle/Foot Dorsiflexion: 4+  Plantar flexion: 4+    Right Ankle/Foot   Dorsiflexion: 4+  Plantar flexion: 4+  Pelvic Floor Exam     Diastatis   Diastasis recti present: yes  3" above umbilicus (# fingers): 1 5  Umbilicus (# fingers): 2  3" below umbilicus (# fingers): 0  able to engage transverse abdominis     General Perineum Exam:   perineum intact       General perineum exam comments: No exam performed at D/C    Visual Inspection of Perineum:   Excursion of perineal body in cephalad direction with contraction of pelvic floor muscles (PFM): good  Excursion of perineal body in caudal direction with relaxation of pelvic floor muscles (PFM): good   PFM Contraction Comments: No exam performed at D/C     Pelvic Floor Muscle Exam       PERFECT Score   Power right: 3/5 (No exam performed at D/C)  Power left: 3/5 (No exam performed at D/C)  Endurance (seconds to max): 3  Repetitions (before fatigue): 3 5  Fast flicks (in 10 seconds): 5               Precautions: hx of depression, post partum    [employer] Access Code: [email or web address]       Manuals 4/15 4/20 4/26 5/6 5/12 5/19 6/3 6/15 6/24    Internal exam nv nv nv MW         External exam nv nv nv MW         ILU massage   nv          Abdominal assessment nv MW           Hip flexor release   MW (L>R) MW MW MW MW MW MW    Hip distraction and flexion mob R   MW  Grade  III MW  Grade  III         Neuro Re-Ed             Pelvic floor anatomy and function, bladder logs and bathroom mechanics edu MW  Fiber chart MW  Fiber chart 10g fiber  32-50oz water edu 10g fiber, water edu 10g fiber, water edu HEP edu  MW HEP   edu  MW HEP edu  MW HEP edu  MW    TA nv     Supine  x15  3" hold Supine  x20  5" hold      DB + TA + fallouts      x10 ea x10 ea      DB + TA + marching      x10 ea x10 ea      kegel nv   Supine  2x10  3" hold mod cues         TA + Quad alt UEs       x10 x20     Quad serratus punch       x20 x20     TA + Quad alt LEs       x10 x20     Diaphragmatic breathing nv Supine  2x10 Supine  x10 Supine  2x10  Supine  3x10 supine + Quad  3x10 ea Quad  2x10     DB + Upper abdominal crunch with PT or pt overpressure  x15 x10  x10        TA + bridge     2x10        Hip hikes     x15 ea        clams     x15 ea        Ther Ex             Open books nv 5x15" ea      5x15" ea     Butterfly stretch nv 3x30" 3x30"   3x30"   3x30"     Piriformis stetch nv 3x30" 3x30" ea  3x30" ea 3x30" ea  3x30" ea     Hip add nv x20  5" hold 2x10  5" hold  50%  hold        Hip abd nv GTB  x20  5" hold GTB  x20  5" hold  sidelying hip abd  x15 ea        Hip flexor stretch in lunge      3x20" ea       QL lunge stretch      3x20" ea       Happy baby stretch  5x20"    5x20" 5x20":  5x20"     Downward dog stretch  5x20"           3 way hip     x20 ea (B)  x20 ea (B)      Cat camel  x15 ea   x15 ea        Ther Activity                                       Gait Training                                       Modalities

## 2023-05-02 ENCOUNTER — OFFICE VISIT (OUTPATIENT)
Dept: OBGYN CLINIC | Facility: CLINIC | Age: 23
End: 2023-05-02

## 2023-05-02 VITALS
SYSTOLIC BLOOD PRESSURE: 124 MMHG | HEART RATE: 99 BPM | HEIGHT: 63 IN | DIASTOLIC BLOOD PRESSURE: 72 MMHG | WEIGHT: 156.6 LBS | BODY MASS INDEX: 27.75 KG/M2

## 2023-05-02 DIAGNOSIS — O36.80X0 PREGNANCY WITH UNCERTAIN FETAL VIABILITY, SINGLE OR UNSPECIFIED FETUS: Primary | ICD-10-CM

## 2023-05-02 DIAGNOSIS — N91.2 AMENORRHEA: ICD-10-CM

## 2023-05-02 PROBLEM — Z87.59 HISTORY OF GESTATIONAL HYPERTENSION: Status: ACTIVE | Noted: 2023-05-02

## 2023-05-02 PROBLEM — Z87.59 HISTORY OF PRIOR PREGNANCY WITH IUGR NEWBORN: Status: ACTIVE | Noted: 2023-05-02

## 2023-05-02 LAB — SL AMB POCT URINE HCG: ABNORMAL

## 2023-05-02 NOTE — PROGRESS NOTES
Subjective   Patient ID: Gisela Valle is a 23 y o  female  Patient is here for a problem visit  Chief Complaint   Patient presents with    Amenorrhea     LMP-3/21/23     Sure LMP 3/21/23 - 6+0 WGA, 23   Tracking cycles, regular  No recent hormonal contraception   Planned and desired pregnancy     Mild nausea, no vomiting - had more severe nausea/vomiting with first pregnancy   No cramping or VB       Menstrual History:  OB History        2    Para   1    Term   1            AB   0    Living   1       SAB   0    IAB   0    Ectopic        Multiple   0    Live Births   1                Patient's last menstrual period was 2023  Past Medical History:   Diagnosis Date    Depression        Past Surgical History:   Procedure Laterality Date    FOOT SURGERY      fracture from gymnastics       Social History     Tobacco Use    Smoking status: Never    Smokeless tobacco: Never   Vaping Use    Vaping Use: Never used   Substance Use Topics    Alcohol use: Not Currently    Drug use: Never        No Known Allergies      Current Outpatient Medications:     Prenatal Multivit-Min-Fe-FA (PRE-SYLVIE PO), Take by mouth, Disp: , Rfl:       Review of Systems   Constitutional: Negative for appetite change, chills and fever  Eyes: Negative for visual disturbance  Respiratory: Negative for cough, chest tightness and shortness of breath  Cardiovascular: Negative for chest pain  Gastrointestinal: Negative for abdominal distention, abdominal pain, constipation, diarrhea, nausea and vomiting  Endocrine: Negative for cold intolerance and heat intolerance  Genitourinary: Negative for difficulty urinating, dyspareunia, dysuria, frequency, genital sores, pelvic pain, urgency, vaginal bleeding, vaginal discharge and vaginal pain  Musculoskeletal: Negative for arthralgias  Neurological: Negative for light-headedness and headaches  Hematological: Does not bruise/bleed easily  "  Psychiatric/Behavioral: Negative for behavioral problems  All other systems reviewed and are negative  /72   Pulse 99   Ht 5' 3\" (1 6 m)   Wt 71 kg (156 lb 9 6 oz)   LMP 03/21/2023   BMI 27 74 kg/m²       Physical Exam  Constitutional:       General: She is not in acute distress  Appearance: Normal appearance  Genitourinary:      Right Labia: No rash, tenderness, lesions or skin changes  Left Labia: No tenderness, lesions, skin changes or rash  Pelvic exam was performed with patient in the lithotomy position  HENT:      Head: Normocephalic and atraumatic  Cardiovascular:      Rate and Rhythm: Normal rate  Pulmonary:      Effort: Pulmonary effort is normal  No respiratory distress  Abdominal:      General: There is no distension  Palpations: Abdomen is soft  Tenderness: There is no abdominal tenderness  There is no guarding or rebound  Neurological:      General: No focal deficit present  Mental Status: She is alert  Psychiatric:         Mood and Affect: Mood normal          Behavior: Behavior normal    Vitals and nursing note reviewed  Results for orders placed or performed in visit on 05/02/23   POCT urine HCG   Result Value Ref Range    URINE HCG Postitive        AMB US OB < 14 weeks single or first gestation level 1  Narrative: FIRST TRIMESTER OBSTETRIC ULTRASOUND  05/02/23    Ally Uribe MD       INDICATION: Amenorrhea, viability    COMPARISON: None  TECHNIQUE:   Transvaginal imaging was performed to assess the gestation,   myometrial/endometrial architecture and ovarian parenchymal detail  The study includes volumetric sweeps and traditional still imaging   technique  FINDINGS:     A single intrauterine gestational sac is seen  Fetal pole and cardiac activity not seen  YOLK SAC:  Present and normal in size and appearance  UTERUS/ADNEXA:   No adnexal mass or pathologic cyst   No free fluid identified     " Impression:    Single intrauterine gestational sac and yolk sac seen  Fetal cardiac activity not detected  No adnexal masses seen  Final MARQUEZ: defer to next ultrasound in 2 weeks  Ultrasound Probe Disinfection    A transvaginal ultrasound was performed  Prior to use, disinfection was performed with High Level Disinfection   Process ([employer])  Probe serial number F: [id number] was used      Providence Mcburney, M D Leonjean paul Ryder  [address]  [address] [address]  Dept: [phone]  Dept Fax: [phone]: [phone]  Loc: [phone]  Loc Fax: [phone]          Appropriate laboratory testing, imaging studies, and prior external records were reviewed:     Assessment/Plan:       Problem List Items Addressed This Visit        Other    Pregnancy with uncertain fetal viability - Primary     GS with YS seen, no fetal pole  Likely due to early gestational age  No current concerning symptoms  Repeat US in 2 weeks  Precautions reviewed         Relevant Orders    AMB US OB < 14 weeks single or first gestation level 1 (Completed)   Other Visit Diagnoses     Amenorrhea        Relevant Orders    POCT urine HCG (Completed)

## 2023-05-02 NOTE — ASSESSMENT & PLAN NOTE
GS with YS seen, no fetal pole  Likely due to early gestational age  No current concerning symptoms  Repeat US in 2 weeks  Precautions reviewed

## 2023-05-16 ENCOUNTER — ULTRASOUND (OUTPATIENT)
Dept: OBGYN CLINIC | Facility: CLINIC | Age: 23
End: 2023-05-16

## 2023-05-16 VITALS
HEART RATE: 82 BPM | WEIGHT: 158 LBS | HEIGHT: 63 IN | BODY MASS INDEX: 28 KG/M2 | SYSTOLIC BLOOD PRESSURE: 124 MMHG | DIASTOLIC BLOOD PRESSURE: 72 MMHG

## 2023-05-16 DIAGNOSIS — O36.80X0 PREGNANCY WITH UNCERTAIN FETAL VIABILITY, SINGLE OR UNSPECIFIED FETUS: Primary | ICD-10-CM

## 2023-05-16 PROBLEM — Z3A.01 6 WEEKS GESTATION OF PREGNANCY: Status: ACTIVE | Noted: 2023-05-16

## 2023-05-16 NOTE — PROGRESS NOTES
Subjective   Patient ID: Ave Delgadillo is a 23 y o  female  Patient is here for a problem visit - repeat US     Chief Complaint   Patient presents with   • Pregnancy Ultrasound     Last seen 23  US with GS and YS without fetal pole, not consistent with LMP dating     Nausea is present although not vomiting  Small frequent meals and pressure point bands help  No cramping or VB     Menstrual History:  OB History        2    Para   1    Term   1            AB   0    Living   1       SAB   0    IAB   0    Ectopic        Multiple   0    Live Births   1                  Patient's last menstrual period was 2023  Past Medical History:   Diagnosis Date   • Depression        Past Surgical History:   Procedure Laterality Date   • FOOT SURGERY      fracture from gymnastics       Social History     Tobacco Use   • Smoking status: Never   • Smokeless tobacco: Never   Vaping Use   • Vaping Use: Never used   Substance Use Topics   • Alcohol use: Not Currently   • Drug use: Never        No Known Allergies      Current Outpatient Medications:   •  Prenatal Multivit-Min-Fe-FA (PRE-SYLVIE PO), Take by mouth, Disp: , Rfl:       Review of Systems   Constitutional: Negative for appetite change, chills and fever  Eyes: Negative for visual disturbance  Respiratory: Negative for cough, chest tightness and shortness of breath  Cardiovascular: Negative for chest pain  Gastrointestinal: Negative for abdominal distention, abdominal pain, constipation, diarrhea, nausea and vomiting  Endocrine: Negative for cold intolerance and heat intolerance  Genitourinary: Negative for difficulty urinating, dyspareunia, dysuria, frequency, genital sores, pelvic pain, urgency, vaginal bleeding, vaginal discharge and vaginal pain  Musculoskeletal: Negative for arthralgias  Neurological: Negative for light-headedness and headaches  Hematological: Does not bruise/bleed easily     Psychiatric/Behavioral: "Negative for behavioral problems  All other systems reviewed and are negative  /72 (BP Location: Right arm, Patient Position: Sitting, Cuff Size: Adult)   Pulse 82   Ht 5' 3\" (1 6 m)   Wt 71 7 kg (158 lb)   LMP 03/21/2023   BMI 27 99 kg/m²       Physical Exam  Constitutional:       General: She is not in acute distress  Appearance: Normal appearance  Genitourinary:      Right Labia: No rash, tenderness, lesions or skin changes  Left Labia: No tenderness, lesions, skin changes or rash  Pelvic exam was performed with patient in the lithotomy position  HENT:      Head: Normocephalic and atraumatic  Cardiovascular:      Rate and Rhythm: Normal rate  Pulmonary:      Effort: Pulmonary effort is normal  No respiratory distress  Abdominal:      General: There is no distension  Palpations: Abdomen is soft  Tenderness: There is no abdominal tenderness  There is no guarding or rebound  Neurological:      General: No focal deficit present  Mental Status: She is alert  Psychiatric:         Mood and Affect: Mood normal          Behavior: Behavior normal    Vitals and nursing note reviewed  AMB US OB < 14 weeks single or first gestation level 1  Narrative: FIRST TRIMESTER OBSTETRIC ULTRASOUND  05/16/23    Kathryn Munguia MD       INDICATION: Amenorrhea, viability    COMPARISON: None  TECHNIQUE:   Transvaginal imaging was performed to assess the gestation,   myometrial/endometrial architecture and ovarian parenchymal detail  The study includes volumetric sweeps and traditional still imaging   technique  FINDINGS:     A single intrauterine gestation is identified  Cardiac activity is detected at 151 bpm       YOLK SAC:  Present and normal in size and appearance  MEAN CROWN RUMP LENGTH:  6 8 mm = 6 weeks 4 days   AMNIOTIC FLUID/SAC SHAPE:  Within expected normal range       UTERUS/ADNEXA:   No adnexal mass or pathologic cyst   No free fluid " identified  Impression:    Single intrauterine pregnancy of 6 weeks 4 days gestational age  MARQUEZ by [address] 1/5/24  MARQUEZ by LMP 12/26/23  Fetal cardiac activity detected  No adnexal masses seen  Final MARQUEZ: 1/5/24 by ultrasound at this encounter  Ultrasound Probe Disinfection    A transvaginal ultrasound was performed  Prior to use, disinfection was performed with High Level Disinfection   Process ([employer])  Probe serial number F: [id number] was used      LEATHA Estevez   Rivka Stallion  [address]  [address] [facility]  [address] [place] [address]  Dept: [phone]  Dept Fax: [phone]: [phone]  Loc: [phone]  Loc Fax: [phone]        Appropriate laboratory testing, imaging studies, and prior external records were reviewed:     Assessment/Plan:       Problem List Items Addressed This Visit        Other    Pregnancy with uncertain fetal viability - Primary     SLIUP seen, MARQUEZ updated  Will return in 2 weeks for repeat US  Precautions reviewed         Relevant Orders    AMB US OB < 14 weeks single or first gestation level 1 (Completed)

## 2023-05-16 NOTE — PATIENT INSTRUCTIONS
- Avoiding fasting for prolonged periods of time - a simple snack such as crackers upon waking may be helpful    - Meals and snacks should be eaten slowly and in small amounts every one to two hours to avoid an overly full stomach  - Don't lie down immediately after eating  - Avoid triggers foods: examples include excessively spicy, odorous, high-fat, acidic, very sweet foods, coffee, soda  - Hard peppermint candies, salomón chews may be helpful    - Vitamin B6 (pyridoxine) 25-50 mg every 6-8 hours  - Unisom (doxylamine) 25 mg 1-2 tabs at night

## 2023-06-01 ENCOUNTER — TELEPHONE (OUTPATIENT)
Facility: HOSPITAL | Age: 23
End: 2023-06-01

## 2023-06-01 ENCOUNTER — ULTRASOUND (OUTPATIENT)
Dept: OBGYN CLINIC | Facility: CLINIC | Age: 23
End: 2023-06-01

## 2023-06-01 VITALS
BODY MASS INDEX: 28.49 KG/M2 | SYSTOLIC BLOOD PRESSURE: 112 MMHG | HEART RATE: 92 BPM | DIASTOLIC BLOOD PRESSURE: 76 MMHG | HEIGHT: 63 IN | WEIGHT: 160.8 LBS

## 2023-06-01 DIAGNOSIS — Z34.80 SUPERVISION OF OTHER NORMAL PREGNANCY, ANTEPARTUM: ICD-10-CM

## 2023-06-01 DIAGNOSIS — Z3A.08 8 WEEKS GESTATION OF PREGNANCY: Primary | ICD-10-CM

## 2023-06-01 PROBLEM — Z3A.01 6 WEEKS GESTATION OF PREGNANCY: Status: RESOLVED | Noted: 2023-05-16 | Resolved: 2023-06-01

## 2023-06-01 NOTE — TELEPHONE ENCOUNTER
Called patient to schedule MFM appointment, based on referral issued to Maternal Fetal Medicine by [facility] office  Left voicemail requesting patient to call back and schedule appointment, with office number for return call [phone]

## 2023-06-01 NOTE — ASSESSMENT & PLAN NOTE
SLIUP seen with appropriate interval growth  MFM referral placed  Will return in 3 weeks for OB intake  Precautions reviewed

## 2023-06-01 NOTE — PROGRESS NOTES
Subjective   Patient ID: Johnson Victor is a 23 y o  female  Patient is here for a problem visit  Chief Complaint   Patient presents with   • Pregnancy Ultrasound     Repeat US      US  - Destiny Daley at 6+4 WGA not c/w LMP   8+6 WGA today   Some constipation but no cramping or VB  Nausea is mild overall, no vomiting  Had severe N/V with first pregnancy     Menstrual History:  OB History        2    Para   1    Term   1            AB   0    Living   1       SAB   0    IAB   0    Ectopic        Multiple   0    Live Births   1                Patient's last menstrual period was 2023  Past Medical History:   Diagnosis Date   • Depression        Past Surgical History:   Procedure Laterality Date   • FOOT SURGERY      fracture from gymnastics       Social History     Tobacco Use   • Smoking status: Never   • Smokeless tobacco: Never   Vaping Use   • Vaping Use: Never used   Substance Use Topics   • Alcohol use: Not Currently   • Drug use: Never        No Known Allergies      Current Outpatient Medications:   •  Prenatal Multivit-Min-Fe-FA (PRE-SYLVIE PO), Take by mouth, Disp: , Rfl:       Review of Systems   Constitutional: Negative for appetite change, chills and fever  Eyes: Negative for visual disturbance  Respiratory: Negative for cough, chest tightness and shortness of breath  Cardiovascular: Negative for chest pain  Gastrointestinal: Positive for constipation and nausea  Negative for abdominal distention, abdominal pain, diarrhea and vomiting  Endocrine: Negative for cold intolerance and heat intolerance  Genitourinary: Negative for difficulty urinating, dyspareunia, dysuria, frequency, genital sores, pelvic pain, urgency, vaginal bleeding, vaginal discharge and vaginal pain  Musculoskeletal: Negative for arthralgias  Neurological: Negative for light-headedness and headaches  Hematological: Does not bruise/bleed easily     Psychiatric/Behavioral: Negative for "behavioral problems  All other systems reviewed and are negative  /76   Pulse 92   Ht 5' 3\" (1 6 m)   Wt 72 9 kg (160 lb 12 8 oz)   LMP 03/21/2023   BMI 28 48 kg/m²       Physical Exam  Constitutional:       General: She is not in acute distress  Appearance: Normal appearance  Genitourinary:      Right Labia: No rash, tenderness, lesions or skin changes  Left Labia: No tenderness, lesions, skin changes or rash  Pelvic exam was performed with patient in the lithotomy position  HENT:      Head: Normocephalic and atraumatic  Cardiovascular:      Rate and Rhythm: Normal rate  Pulmonary:      Effort: Pulmonary effort is normal  No respiratory distress  Abdominal:      General: There is no distension  Palpations: Abdomen is soft  Tenderness: There is no abdominal tenderness  There is no guarding or rebound  Neurological:      General: No focal deficit present  Mental Status: She is alert  Psychiatric:         Mood and Affect: Mood normal          Behavior: Behavior normal    Vitals and nursing note reviewed  AMB US OB < 14 weeks single or first gestation level 1  Narrative: FIRST TRIMESTER OBSTETRIC ULTRASOUND  06/01/23    Becky Jaquez MD       INDICATION: Amenorrhea, viability    COMPARISON: None  TECHNIQUE:   Transvaginal imaging was performed to assess the gestation,   myometrial/endometrial architecture and ovarian parenchymal detail  The study includes volumetric sweeps and traditional still imaging   technique  FINDINGS:     A single intrauterine gestation is identified  Cardiac activity is detected at 176 bpm       YOLK SAC:  Present and normal in size and appearance  MEAN CROWN RUMP LENGTH:  27 6 mm = 9 weeks 4 days   AMNIOTIC FLUID/SAC SHAPE:  Within expected normal range  UTERUS/ADNEXA:   No adnexal mass or pathologic cyst   No free fluid identified       Impression:    Single intrauterine pregnancy of 9 weeks 4 days " gestational age  MARQUEZ by [address] today 12/31/23  Fetal cardiac activity detected  No adnexal masses seen  Final MARQUEZ: 1/5/24 by previous ultrasound    Ultrasound Probe Disinfection    A transvaginal ultrasound was performed  Prior to use, disinfection was performed with High Level Disinfection   Process ([employer])  Probe serial number F: [id number] was used      LEATHA Gomez Fearing  [address]  [address] [address]  Dept: [phone]  Dept Fax: Corina Sparrow: [phone]  Loc: [phone]  Loc Fax: [phone]        Appropriate laboratory testing, imaging studies, and prior external records were reviewed:     Assessment/Plan:       Problem List Items Addressed This Visit        Other    Supervision of other normal pregnancy, antepartum     SLIUP seen with appropriate interval growth  MFM referral placed  Will return in 3 weeks for OB intake  Precautions reviewed         8 weeks gestation of pregnancy - Primary    Relevant Orders    Ambulatory Referral to Maternal Fetal Medicine    [facility] OB < 14 weeks single or first gestation level 1 (Completed)

## 2023-06-09 NOTE — PROGRESS NOTES
The patient was identified by name and date of birth  The patient was informed that this is a telemedicine visit and that the visit is being conducted through [facility] and patient was informed this is a secure, HIPAA-complaint platform  She agrees to proceed     My office door was closed  No one else was in the room  She acknowledged consent and understanding of privacy and security of the video platform  The patient has agreed to participate and understands they can discontinue the visit at any time  23 y o  y/o female here for OB intake  TV u/s done 23 confirms SLIUP 6w4d, final EDC 24 by [address]  Current Outpatient Medications on File Prior to Visit   Medication Sig Dispense Refill   • Prenatal Multivit-Min-Fe-FA (PRE-SYLVIE PO) Take by mouth       No current facility-administered medications on file prior to visit         Past Surgical History:   Procedure Laterality Date   • FOOT SURGERY      fracture from gymnastics       Past Medical History:   Diagnosis Date   • Depression        OB History        2    Para   1    Term   1            AB   0    Living   1       SAB   0    IAB   0    Ectopic        Multiple   0    Live Births   1                 Previous pregnancy history/ complications: gHTN, following for IUGR    Genetic screen:  Canavan disease- negative  Cerebral palsy- negative  Cleft lip/palate- negative  Congenital anomalies- negative  Congenital heart disease- negative  Consanguinity- negative  Cystic fibrosis- negative  Down's syndrome- negative  Hemophilia- negative  Bleckley's chorea- negative  Mental retardation/ intellectual disability/ cognitive delays- negative  Muscular dystrophy- negative  Neural tube defect- negative  Sickle cell anemia- negative  Ben-sachs disease- negative  Fragile X- negative  Thalassemia- negative  Autism- negative  Type 1 diabetes- negative  PKU- negative  Premature ovarian failure- negative      Age of patient: 23  Age of father of the baby: Mervin Kilgore    Exposure risk:  Occupation: virtual  Exposure chemicals/radiation:no  Alcohol exposure: no  Medications taking since LMP:no  Drug exposure:no  Smoker: no  Cat litter exposure: no    Depression screen:  12  Domestic violence screen: negative      ZIKA screening:  Travel/ or plans for travel outside / [place] area/ [place]:  no,  zika precautions given    TB screening:   ? HIV-no  ? Close contact with individuals with known or suspected TB-no  ? Medical conditions known to increase risk of disease if infected  ? Ie  Diabetes, lupus, cancer, alcoholism, drug addiction- no  ? Birth in or emigration from high prevalent countries- no  ? Medically underserved- no  ? Homeless- no  ? Living or working in long term care facilities - no    DRUG screening:  Parents:  Did any of your parents have a problem with alcohol or other drug use?  no    Partner: Does your partner have a problem with alcohol or drug use? no    Past: In the past, have you had difficulties in your life because of alcohol or other drugs, including prescription medication? no    Present:  In the past month have you drunk any alcohol or used other drugs?  no    Thyroid disease risk:  BMI >30: no  Type 1 diabetes: no  Fhx or personal hx of thyroid disease: no    Diabetes risk screening:   BMI 30 or greater: no  Hx of macrosomia ( infant weight 9 lb or greater): no  Previous GDM hx: no  Hx PCOS or insulin resistance: no  Hx of elevated HgbA1c, glucose tolerance, fasting glucose: no    HTN: no  Hx elevated HDL/ triglycerides:no  1st degree relative with diabetes: no  Hx cardiovascular ds: no   [place], , native [place], [place], : no        Glucola ordered: no    Other screening:  Ashkenazi Sikhism/ [place] descent: no, kenneth-sac screening offered: no    Hx of gastric bypass/ gastric sleeve/ gastric band: no    Hx of HSV for patient or partner: no    Hx of MRSA: no    Last pap: 4/15/21  Hx of abnormal pap smear: no  Hx of procedures to the cervix: no    Hx of chlamydia/ gonorrhea/ PID: no    Hx recurrent pregnancy loss/ stillbirth: no    Was this pregnancy a result of infertility treatment: no    Vaccine Hx:  Varicella: + vaccine  Hep B vaccine: completed   COVID vaccine :  Not completed     Hx of covid in last 3 months: no    ASA/ PEC screen:  Previous uncomplicated full-term delivery: no  Multifetal gestation- 2 points: 0  Chronic HTN- 2 points: 0  Type 1 or 2 pre-gestational diabetes- 2 points: 0  Renal disease- 2 points: 0  Autoimmune disease- 2 points: 0  History of preeclampsia- 2 points: 0  IVF pregnancy- 1 point: 0  >10 year pregnancy interval-1 point:   Previous pregnancy with IUGR/ SGA/ adverse pregnancy outcome-1 point: 1  Nulliparity- 1 point: 0  BMI >30- 1 point: 0  Family history of preeclampsia in mother or sister- 1 point: 0  Age >or = 28- 1 point: 0   Race- 1 point: 0  Low socioeconomic status-1 point: 0    TOTAL SCORE: 1    Pertinent + Pre eclampsia risk factors: IUGR    Pt has been recommended to take ASA 162mg during this pregnancy to lower her risk of preeclampsia: no    Other:  Pre pregnancy weight: 156lb    Ok with blood transfusion if needed: yes    Plans for feeding baby: breast    Blood type: A positive      Preferred lab: [facility]    ONAF form needed: no    Nausea: +  Vomiting: some vomiting over weekend  severe cramping/ pain: no  Bleeding since pregnant: no  Urinary complaints: no  Fever or rash since pregnant: no    PROBLEM LIST includes:    Hx of IUGR:  Baby weighed 6lb 6 5 oz at 39 wks  Hx of gHTN  Depression: referral to behavioral health sent  Family history of ovarian cancer:  Declines referral to genetic oncology at this time       -Pregnancy: H&P completed today  Prenatal course discussed with patient, including appointment schedule  Warning signs discussed and reviewed    OB folder to be given with warning signs of pregnancy, prenatal visit schedule, safe medications in pregnancy, childbirth classes, lab location info, MFM info      -Genetic testing: nuchal translucency/Sequential screening/ NIPT reviewed with patient - appt with MFM scheduled 6/29/23    -Carrier screening including Cystic fibrosis and spinal muscular atrophy reviewed: patient declines    -OB exam: need to set up visit , see other encounter       NEEDS:  ob folder,,gonorrhea/ chlamydia culture

## 2023-06-13 PROBLEM — Z3A.10 10 WEEKS GESTATION OF PREGNANCY: Status: ACTIVE | Noted: 2023-06-01

## 2023-06-15 ENCOUNTER — TELEMEDICINE (OUTPATIENT)
Dept: OBGYN CLINIC | Facility: CLINIC | Age: 23
End: 2023-06-15

## 2023-06-15 DIAGNOSIS — Z3A.10 10 WEEKS GESTATION OF PREGNANCY: ICD-10-CM

## 2023-06-15 DIAGNOSIS — F32.A DEPRESSION, UNSPECIFIED DEPRESSION TYPE: ICD-10-CM

## 2023-06-15 DIAGNOSIS — Z34.80 SUPERVISION OF OTHER NORMAL PREGNANCY, ANTEPARTUM: Primary | ICD-10-CM

## 2023-06-15 DIAGNOSIS — Z87.59 HISTORY OF GESTATIONAL HYPERTENSION: ICD-10-CM

## 2023-06-15 PROCEDURE — OBC: Performed by: PHYSICIAN ASSISTANT

## 2023-06-15 NOTE — PATIENT INSTRUCTIONS
"Again, congratulations on your pregnancy! NEXT STEPS  Get Prenatal bloodwork if not already done  Call [facility] to schedule next ultrasound (done 12-13 wks)   Contact information for [facility] (AKA Maternal Fetal [facility])- Main Number [phone]   Return to our office in 4 weeks for routine prenatal visit (or sooner if any problems/concerns arise- see packet for things to report)  Check out [employer] website to read the [employer]"      It can be found by going to [employer]-->select services-->select women's health-->select Obstetrics  [email or web address]  ----------------------------------------------------  [facility] [address], [address]  [address] [facility], [address]    Warning Signs During Pregnancy  The list below includes warning signs your providers would like you to be aware of  If you experience any of these at any time during your pregnancy, please call us as soon as possible  Vaginal bleeding   Sharp abdominal pain that does not go away   Fever (more than 100  4? F and is not relieved with Tylenol)   Persistent vomiting lasting greater than 24 hours   Chest pain/Shortness of breath   Pain or burning when you urinate    Call the OFFICE [phone] for any questions/emergencies  At night or on the weekend, calls go through a triage service, please indicate it is an emergency and the DOCTOR on call will be paged    ---------------------------------------------------------------    Discomforts of Early Pregnancy  Tips for coping with nausea and vomiting during pregnancy   Eat meals and snacks slowly   Eat every 1-2 hours to avoid a full stomach   Don’t skip meals, avoid empty stomach   Eat a snack prior to getting out of bed   Avoid food and beverages with a strong aroma   Avoid dehydration - drink enough fluid to keep the urine pale yellow   Drink fluids before a meal to minimize " the effect of a full stomach   Limit the amount of coffee and beverages that contain caffeine   Eliminate spicy, odorous, high fat (fried foods), acidic (tomato products) and sweet foods   Fluids that contain lemon (lemonade), mint (tea) or orange can usually be well tolerated   Snacks and meals that contain low-fat protein (lean meats, fish, poultry and eggs) along with eating easily digestible carbs (fruit, rice, toast, crackers and dry cereal) may be tolerated better   Foods with ginger may be well tolerated  May use ginger root powder, capsules or extract (up to 1000 mg per day)   Drink liquids in small amounts    If symptoms persist, please contact your provider  Tips for coping with constipation during pregnancy   Increase fiber and fluids   - Drink 8-10 cups of liquid, like water or low-sugar juice daily  - Keep urine pale with fluids (water, milk), fruit and vegetables   Eat a well-balanced diet that contains high fiber food (fruits, vegetables, whole grain breads and cereals, bran and dried beans)   Take a 30-minute walk daily   You may take a mild stool softener such as Colace®    If symptoms persist, please contact your provider  For any emergencies, PLEASE CALL THE OFFICE at [phone]  If the office is closed, the doctor on call will be paged by the answering service  Medications and Pregnancy- see handout in packetExpected Weight Gain During Pregnancy  If you have a healthy BMI (18-25) prior to pregnancy:  The recommended weight gain is between 25-35 pounds  Approximate weight gain  in the first trimester is 1-4 5 pounds  An expected weight gain during the second and  third trimester is approximately one pound per week  If you have a BMI of less than 18 prior to pregnancy,  you are considered underweight:  The recommended weight gain is between 28-40 pounds  Approximate weight gain  in the first trimester is 1-4 5 pounds   An expected weight gain during the second and  third trimester is just over one pound per week  If your BMI is 25 to 29 9: you are considered overweight:  You should gain 1/2 to 2/3 pound during the second and third trimester, for a total  weight gain of 15 to 25 pounds  If you have a BMI of greater than 30 prior to pregnancy,  you are considered overweight:  The recommended weight gain is between 15-25 pounds  Approximate weight gain  in the first trimester is 1-4 5 pounds  An expected weight gain during the second  and third trimester is approximately 0 5 pound per week  Foods to avoid during pregnancy:   Unpasteurized milk, juice and cheese  - Soft cheeses like feta or brie (if made with UNPASTEURIZED milk)   Unheated deli meats like lunchmeat and hotdogs   Undercooked poultry, beef, pork, seafood including raw sushi    What fish is safe to eat during pregnancy? Eat 8 to 12 ounces of fish a week  Pick from this group frequently, especially if you follow  the American Heart Association’s recommendation to eat fish at least 2 times a week  AVOID HIGH MERCURY FISH  A single meal of the following fish can put  you over the Environmental Protection  Agency’s safe limit for the month  High mercury fish:  Shark  Swordfish  [employer]  Tile Fish    Caffeine and Pregnancy  The [employer] and [employer] of Obstetrics and Gynecologists (ACOG) urge pregnant  women to limit their caffeine consumption to no more than 200 milligrams (mg) per day  This is  comparable to having one 12-ounce cup of coffee a day  This level has been shown not to increase risk  of miscarriage, growth or  labor complications  Effects of higher levels are not known  Exercise During Pregnancy  A daily exercise program that consists of 30 minutes a day is recommended     Low impact exercises like walking and swimming are great exercises throughout  all of pregnancy   If you’re an avid strength  avoid lifting very heavy weights - nothing more  than 30 pounds    Drink plenty of fluids while exercising to stay hydrated  Be careful to avoid overheating  ACTIVITIES TO AVOID   Exercises that can make you lose your balance  Activities that can put your baby at risk i e  horseback riding, scuba diving, skiing  or snowboarding  Any other sport that puts you at risk for getting hit in the  abdominal area  Do not use saunas, steam rooms or hot tubs (that have a higher temperature  than 100F)   After the first trimester, avoid exercises that require you to lay flat on your back  Avoid exceeding a heart rate greater than 140 beats per minute  As long as you are  able to hold a conversation while exercising your heart rate is likely acceptable    Vaccines and Pregnancy    Information for pregnant women  Vaccines help protect you and your baby against serious diseases  Whooping Cough Vaccine  Whooping cough (or pertussis) can be serious for anyone, but for your , it can be lifethreatening  Up to 20 babies die each year in the [facility] due to whooping cough  When you get the whooping cough vaccine during your pregnancy, your body will create protective  antibodies and pass some of them to your baby before birth  These antibodies will provide your  baby some short-term, early protection against whooping cough  Learn more at www cdc gov/pertussis/pregnant/     Flu Vaccine  Changes in your immune, heart, and lung functions during pregnancy make you more likely to get  seriously ill from the flu  Catching the flu also increases your chances for serious problems for your  developing baby, including premature labor and delivery  Get the flu shot if you are pregnant during  flu season--it’s the best way to protect yourself and your baby for several months after birth from flu-related  complications  Flu seasons vary in their timing from season to season, but CDC recommends getting vaccinated  by the end of October, if possible   This timing helps protect you before flu activity begins to increase  Find more on how to prevent the flu by visiting www cdc gov/flu/     Covid Vaccine  Similar to the flu, Changes in your immune, heart, and lung functions during pregnancy make you more likely to get  seriously ill from COVID  It  also increases your chances for serious problems for your  developing baby, including premature labor and delivery  Please consider getting your covid vaccine if you haven't already  This is endorsed by both Adela Parks of Obstetrics and Gynecology and Felipa Zhong of Maternal Fetal Medicine    Fetal Activity  You will most likely start to feel your baby move (also known as quickening) between  25 and 20 weeks of pregnancy  First time moms might feel their baby’s movements  closer to 25 weeks while a second time mom might feel those first movements closer  to 16 weeks

## 2023-06-22 ENCOUNTER — APPOINTMENT (OUTPATIENT)
Dept: LAB | Facility: MEDICAL CENTER | Age: 23
End: 2023-06-22
Payer: COMMERCIAL

## 2023-06-22 DIAGNOSIS — Z34.80 SUPERVISION OF OTHER NORMAL PREGNANCY, ANTEPARTUM: ICD-10-CM

## 2023-06-22 DIAGNOSIS — Z87.59 HISTORY OF GESTATIONAL HYPERTENSION: ICD-10-CM

## 2023-06-22 LAB
ABO GROUP BLD: NORMAL
ALBUMIN SERPL BCP-MCNC: 3.6 G/DL (ref 3.5–5)
ALP SERPL-CCNC: 38 U/L (ref 46–116)
ALT SERPL W P-5'-P-CCNC: 15 U/L (ref 12–78)
ANION GAP SERPL CALCULATED.3IONS-SCNC: 4 MMOL/L
AST SERPL W P-5'-P-CCNC: 11 U/L (ref 5–45)
BACTERIA UR QL AUTO: ABNORMAL /HPF
BASOPHILS # BLD AUTO: 0.03 THOUSANDS/ÂΜL (ref 0–0.1)
BASOPHILS NFR BLD AUTO: 1 % (ref 0–1)
BILIRUB SERPL-MCNC: 0.31 MG/DL (ref 0.2–1)
BILIRUB UR QL STRIP: NEGATIVE
BLD GP AB SCN SERPL QL: NEGATIVE
BUN SERPL-MCNC: 10 MG/DL (ref 5–25)
CALCIUM SERPL-MCNC: 9.2 MG/DL (ref 8.3–10.1)
CHLORIDE SERPL-SCNC: 107 MMOL/L (ref 96–108)
CLARITY UR: ABNORMAL
CO2 SERPL-SCNC: 25 MMOL/L (ref 21–32)
COLOR UR: ABNORMAL
CREAT SERPL-MCNC: 0.52 MG/DL (ref 0.6–1.3)
CREAT UR-MCNC: 75.2 MG/DL
EOSINOPHIL # BLD AUTO: 0.15 THOUSAND/ÂΜL (ref 0–0.61)
EOSINOPHIL NFR BLD AUTO: 2 % (ref 0–6)
ERYTHROCYTE [DISTWIDTH] IN BLOOD BY AUTOMATED COUNT: 13.4 % (ref 11.6–15.1)
GFR SERPL CREATININE-BSD FRML MDRD: 134 ML/MIN/1.73SQ M
GLUCOSE P FAST SERPL-MCNC: 86 MG/DL (ref 65–99)
GLUCOSE UR STRIP-MCNC: NEGATIVE MG/DL
HBV SURFACE AB SER-ACNC: 8.65 MIU/ML
HBV SURFACE AG SER QL: NORMAL
HCT VFR BLD AUTO: 38.2 % (ref 34.8–46.1)
HCV AB SER QL: NORMAL
HGB BLD-MCNC: 12.6 G/DL (ref 11.5–15.4)
HGB UR QL STRIP.AUTO: NEGATIVE
HIV 1+2 AB+HIV1 P24 AG SERPL QL IA: NORMAL
HIV 2 AB SERPL QL IA: NORMAL
HIV1 AB SERPL QL IA: NORMAL
HIV1 P24 AG SERPL QL IA: NORMAL
IMM GRANULOCYTES # BLD AUTO: 0.01 THOUSAND/UL (ref 0–0.2)
IMM GRANULOCYTES NFR BLD AUTO: 0 % (ref 0–2)
KETONES UR STRIP-MCNC: NEGATIVE MG/DL
LEUKOCYTE ESTERASE UR QL STRIP: ABNORMAL
LYMPHOCYTES # BLD AUTO: 1.81 THOUSANDS/ÂΜL (ref 0.6–4.47)
LYMPHOCYTES NFR BLD AUTO: 29 % (ref 14–44)
MCH RBC QN AUTO: 28 PG (ref 26.8–34.3)
MCHC RBC AUTO-ENTMCNC: 33 G/DL (ref 31.4–37.4)
MCV RBC AUTO: 85 FL (ref 82–98)
MONOCYTES # BLD AUTO: 0.47 THOUSAND/ÂΜL (ref 0.17–1.22)
MONOCYTES NFR BLD AUTO: 8 % (ref 4–12)
MUCOUS THREADS UR QL AUTO: ABNORMAL
NEUTROPHILS # BLD AUTO: 3.71 THOUSANDS/ÂΜL (ref 1.85–7.62)
NEUTS SEG NFR BLD AUTO: 60 % (ref 43–75)
NITRITE UR QL STRIP: NEGATIVE
NON-SQ EPI CELLS URNS QL MICRO: ABNORMAL /HPF
NRBC BLD AUTO-RTO: 0 /100 WBCS
PH UR STRIP.AUTO: 6.5 [PH]
PLATELET # BLD AUTO: 242 THOUSANDS/UL (ref 149–390)
PMV BLD AUTO: 11 FL (ref 8.9–12.7)
POTASSIUM SERPL-SCNC: 4 MMOL/L (ref 3.5–5.3)
PROT SERPL-MCNC: 7.3 G/DL (ref 6.4–8.4)
PROT UR STRIP-MCNC: NEGATIVE MG/DL
PROT UR-MCNC: 9 MG/DL
PROT/CREAT UR: 0.12 MG/G{CREAT} (ref 0–0.1)
RBC # BLD AUTO: 4.5 MILLION/UL (ref 3.81–5.12)
RBC #/AREA URNS AUTO: ABNORMAL /HPF
RH BLD: POSITIVE
RUBV IGG SERPL IA-ACNC: 25.7 IU/ML
SODIUM SERPL-SCNC: 136 MMOL/L (ref 135–147)
SP GR UR STRIP.AUTO: 1.01 (ref 1–1.03)
SPECIMEN EXPIRATION DATE: NORMAL
TREPONEMA PALLIDUM IGG+IGM AB [PRESENCE] IN SERUM OR PLASMA BY IMMUNOASSAY: NORMAL
UROBILINOGEN UR STRIP-ACNC: <2 MG/DL
WBC # BLD AUTO: 6.18 THOUSAND/UL (ref 4.31–10.16)
WBC #/AREA URNS AUTO: ABNORMAL /HPF

## 2023-06-22 PROCEDURE — 86762 RUBELLA ANTIBODY: CPT

## 2023-06-22 PROCEDURE — 36415 COLL VENOUS BLD VENIPUNCTURE: CPT

## 2023-06-22 PROCEDURE — 81001 URINALYSIS AUTO W/SCOPE: CPT

## 2023-06-22 PROCEDURE — 86780 TREPONEMA PALLIDUM: CPT

## 2023-06-22 PROCEDURE — 87340 HEPATITIS B SURFACE AG IA: CPT

## 2023-06-22 PROCEDURE — 82570 ASSAY OF URINE CREATININE: CPT

## 2023-06-22 PROCEDURE — 86803 HEPATITIS C AB TEST: CPT

## 2023-06-22 PROCEDURE — 84156 ASSAY OF PROTEIN URINE: CPT

## 2023-06-22 PROCEDURE — 86900 BLOOD TYPING SEROLOGIC ABO: CPT

## 2023-06-22 PROCEDURE — 86850 RBC ANTIBODY SCREEN: CPT

## 2023-06-22 PROCEDURE — 83020 HEMOGLOBIN ELECTROPHORESIS: CPT

## 2023-06-22 PROCEDURE — 85025 COMPLETE CBC W/AUTO DIFF WBC: CPT

## 2023-06-22 PROCEDURE — 87389 HIV-1 AG W/HIV-1&-2 AB AG IA: CPT

## 2023-06-22 PROCEDURE — 80053 COMPREHEN METABOLIC PANEL: CPT

## 2023-06-22 PROCEDURE — 87086 URINE CULTURE/COLONY COUNT: CPT

## 2023-06-22 PROCEDURE — 86706 HEP B SURFACE ANTIBODY: CPT

## 2023-06-22 PROCEDURE — 86901 BLOOD TYPING SEROLOGIC RH(D): CPT

## 2023-06-23 ENCOUNTER — TELEPHONE (OUTPATIENT)
Age: 23
End: 2023-06-23

## 2023-06-23 NOTE — TELEPHONE ENCOUNTER
Left message on machine for pt to call office re:     Labs show her hepatitis B antibody is on the lower side  Her level was 8 65, per lab >10 considered immune  This means her vaccine may be wearing off  She may want to consider revaccination  She should discuss with her PCP  Ok to get hep B vaccine while pregnant

## 2023-06-24 LAB
BACTERIA UR CULT: ABNORMAL
BACTERIA UR CULT: ABNORMAL
HGB A MFR BLD: 2.7 % (ref 1.8–3.2)
HGB A MFR BLD: 97.3 % (ref 96.4–98.8)
HGB F MFR BLD: 0 % (ref 0–2)
HGB FRACT BLD-IMP: NORMAL
HGB S MFR BLD: 0 %

## 2023-06-25 NOTE — TELEPHONE ENCOUNTER
Please call patient:    1) Labs show her hepatitis B antibody is on the lower side  Her level was 8 65, per lab >10 considered immune  This means her vaccine may be wearing off  She may want to consider revaccination  She should discuss with her PCP  Ok to get hep B vaccine while pregnant  2) urine is showing lactobacilli  This is a common vaginal contaminant  Is patient having any urinary symptoms? If no symptoms, nothing further needed  If symptoms, please get info on symptoms and send to a provider for consideration of treatment

## 2023-06-26 NOTE — TELEPHONE ENCOUNTER
Patient aware of results of hep b and patient is not having any symptoms, let patient know if s/s occur to give office a call back   Patient understood

## 2023-06-29 ENCOUNTER — ROUTINE PRENATAL (OUTPATIENT)
Dept: PERINATAL CARE | Facility: OTHER | Age: 23
End: 2023-06-29
Payer: COMMERCIAL

## 2023-06-29 VITALS
WEIGHT: 162.8 LBS | SYSTOLIC BLOOD PRESSURE: 100 MMHG | BODY MASS INDEX: 28.84 KG/M2 | HEART RATE: 96 BPM | DIASTOLIC BLOOD PRESSURE: 60 MMHG | HEIGHT: 63 IN

## 2023-06-29 DIAGNOSIS — Z36.82 NUCHAL TRANSLUCENCY OF FETUS ON PRENATAL ULTRASOUND: ICD-10-CM

## 2023-06-29 DIAGNOSIS — Z87.59 HISTORY OF GESTATIONAL HYPERTENSION: ICD-10-CM

## 2023-06-29 DIAGNOSIS — Z3A.12 12 WEEKS GESTATION OF PREGNANCY: ICD-10-CM

## 2023-06-29 DIAGNOSIS — O09.291 IUGR (INTRAUTERINE GROWTH RESTRICTION) IN PRIOR PREGNANCY, PREGNANT, FIRST TRIMESTER: Primary | ICD-10-CM

## 2023-06-29 PROCEDURE — 76801 OB US < 14 WKS SINGLE FETUS: CPT | Performed by: OBSTETRICS & GYNECOLOGY

## 2023-06-29 PROCEDURE — 76813 OB US NUCHAL MEAS 1 GEST: CPT | Performed by: OBSTETRICS & GYNECOLOGY

## 2023-06-29 RX ORDER — ASPIRIN 81 MG/1
162 TABLET, CHEWABLE ORAL DAILY
Qty: 180 TABLET | Refills: 2 | Status: SHIPPED | OUTPATIENT
Start: 2023-06-29

## 2023-06-29 NOTE — PROGRESS NOTES
OFFICE CONSULT  Referring physician:   Md Thong Saenz [id number]      Dear Dr Benjamin Costa      Thank you for requesting a  consultation on your patient Ms Kae Manuel for the following indications:  Genetic screening    She reports her cycles are normally 33 days long  Her initial ultrasound at 6 weeks has views of the crown-rump length with irregular borders and gave a due date of 2024  Her 9-week ultrasound views were much more crisp and gave a due date of 23  History  Medications: Prenatal vitamins  Allergies to medications: None  Past medical history: Depression  Past surgical history: History of a fracture from gymnastics requiring foot surgery  Past obstetrical history:  2 para 1  10/12/2021 at 39 weeks and 2 days she had a 6 pounds 6 5 ounce baby girl vaginally  In that pregnancy she was followed for possible growth restriction with normal Dopplers  She also developed COVID in that pregnancy and had mild gestational hypertension in labor and then had a severe elevated blood pressure at home postpartum that did not require medical management in the er and ultimately resolved  Social history: Does not report any substance use  First generation family history: Noncontributory    Ultrasound findings: The ultrasound shows a fetus that is more concordant with her 9-week ultrasound than her 6-week ultrasound  The nasal bone and nuchal translucency appears normal  No malformations are seen on today's early ultrasound  The patient was informed of the findings and counseled about the limitations of the exam in detecting all forms of fetal congenital abnormalities  She does not report any vaginal bleeding or uterine cramping or contractions  Specific counseling was provided on the following problems:  1   We discussed the options for genetic screening which include invasive testing on the fetal placenta or on fetal skin cells within the amniotic fluid and compared this to noninvasive testing which includes cell free DNA screening and the sequential screen  We reviewed the risks, the benefits and the limitations of each  In the end patient she is not sure if she wants to proceed with cell free DNA screening  She reports she will call the office if she decides to have it in the future  2  With a history of gestational hypertension that developed at the end of her last pregnancy she may be at increased risk for developing recurrent gestational hypertension or even preeclampsia in this pregnancy  Recommend baseline preeclamptic labs (which were completed and were normal) and would recommend she start baby aspirin 162 mg daily till 36 weeks and 0 days to decrease her recurrence risk  3  We reviewed the difficulty in dating her pregnancy since she does not have 28-day cycles and the views of her 6-week dating scan are difficult to visualize well  Having accurate dates is very important because she tends to have smaller babies and she wants to avoid early induction for poor dating  I feel that the 6-week ultrasound is less accurate than the 9-week ultrasound for dating based on my review of the images  If her 20-week ultrasound seems to correlate better with her 9-week ultrasound then with her 6-week ultrasound we discussed the possibility of updating her due date to match her 9-week ultrasound  Future tests recommended:  Screening for spina bifida with an MSAFP screen is a future test that can be prescribed through her OB office  This blood work should be drawn preferably at 16 to 18 weeks so that the results return prior to her next scan  The test though can be run until 21 weeks and 6 days if needed  Future ultrasounds ordered today:   1  Fetal Level II ultrasound imaging is recommended at 19-20 weeks' gestation        Pre visit time reviewing her records   15 minutes  Face to face time 10 minutes  Post visit time on documentation of note, updating her problem list, adding orders and prescriptions 15 minutes  Procedures that were completed today were charged separately  The level of decision making was low complexity      Sae Felipe MD

## 2023-06-29 NOTE — LETTER
2023     Aliyah Yeung, [address] [place] [address] [address]  [address] [place] [address]    Patient: Anibal Sanchez   YOB: 2000   Date of Visit: 2023       Dear Dr Elias Stanford: Thank you for referring Anibal Sanchez to me for evaluation  Below are my notes for this consultation  If you have questions, please do not hesitate to call me  I look forward to following your patient along with you  Sincerely,        Roseline Cordova MD        CC: No Recipients    Roseline Cordova MD  2023  9:11 PM  Sign when Signing Visit  OFFICE CONSULT  Referring physician:   Aliyah Yeung Md  [address]      Dear Dr Elias Stanford      Thank you for requesting a  consultation on your patient Ms Anibal Sanchez for the following indications:  Genetic screening    She reports her cycles are normally 33 days long  Her initial ultrasound at 6 weeks has views of the crown-rump length with irregular borders and gave a due date of 2024  Her 9-week ultrasound views were much more crisp and gave a due date of 23  History  Medications: Prenatal vitamins  Allergies to medications: None  Past medical history: Depression  Past surgical history: History of a fracture from gymnastics requiring foot surgery  Past obstetrical history:  2 para 1  10/12/2021 at 39 weeks and 2 days she had a 6 pounds 6 5 ounce baby girl vaginally  In that pregnancy she was followed for possible growth restriction with normal Dopplers  She also developed COVID in that pregnancy and had mild gestational hypertension in labor and then had a severe elevated blood pressure at home postpartum that did not require medical management in the er and ultimately resolved  Social history: Does not report any substance use  First generation family history: Noncontributory    Ultrasound findings:  The ultrasound shows a fetus that is more concordant with her 9-week ultrasound than her 6-week ultrasound  The nasal bone and nuchal translucency appears normal  No malformations are seen on today's early ultrasound  The patient was informed of the findings and counseled about the limitations of the exam in detecting all forms of fetal congenital abnormalities  She does not report any vaginal bleeding or uterine cramping or contractions  Specific counseling was provided on the following problems:  1  We discussed the options for genetic screening which include invasive testing on the fetal placenta or on fetal skin cells within the amniotic fluid and compared this to noninvasive testing which includes cell free DNA screening and the sequential screen  We reviewed the risks, the benefits and the limitations of each  In the end patient she is not sure if she wants to proceed with cell free DNA screening  She reports she will call the office if she decides to have it in the future  2  With a history of gestational hypertension that developed at the end of her last pregnancy she may be at increased risk for developing recurrent gestational hypertension or even preeclampsia in this pregnancy  Recommend baseline preeclamptic labs (which were completed and were normal) and would recommend she start baby aspirin 162 mg daily till 36 weeks and 0 days to decrease her recurrence risk  3  We reviewed the difficulty in dating her pregnancy since she does not have 28-day cycles and the views of her 6-week dating scan are difficult to visualize well  Having accurate dates is very important because she tends to have smaller babies and she wants to avoid early induction for poor dating  I feel that the 6-week ultrasound is less accurate than the 9-week ultrasound for dating based on my review of the images    If her 20-week ultrasound seems to correlate better with her 9-week ultrasound then with her 6-week ultrasound we discussed the possibility of updating her due date to match her 9-week ultrasound  Future tests recommended:  Screening for spina bifida with an MSAFP screen is a future test that can be prescribed through her OB office  This blood work should be drawn preferably at 16 to 18 weeks so that the results return prior to her next scan  The test though can be run until 21 weeks and 6 days if needed  Future ultrasounds ordered today:   1  Fetal Level II ultrasound imaging is recommended at 19-20 weeks' gestation  Pre visit time reviewing her records   15 minutes  Face to face time 10 minutes  Post visit time on documentation of note, updating her problem list, adding orders and prescriptions 15 minutes  Procedures that were completed today were charged separately  The level of decision making was low complexity      Chelsey Reagan MD

## 2023-06-30 ENCOUNTER — TELEMEDICINE (OUTPATIENT)
Dept: BEHAVIORAL/MENTAL HEALTH CLINIC | Facility: CLINIC | Age: 23
End: 2023-06-30
Payer: COMMERCIAL

## 2023-06-30 DIAGNOSIS — F32.A DEPRESSION AFFECTING PREGNANCY IN FIRST TRIMESTER, ANTEPARTUM: Primary | ICD-10-CM

## 2023-06-30 DIAGNOSIS — O99.341 DEPRESSION AFFECTING PREGNANCY IN FIRST TRIMESTER, ANTEPARTUM: Primary | ICD-10-CM

## 2023-06-30 PROCEDURE — 90791 PSYCH DIAGNOSTIC EVALUATION: CPT | Performed by: COUNSELOR

## 2023-06-30 NOTE — PSYCH
Virtual AWV Consent    Verification of patient location: confirmed at home     Patient is located at Home in the following state in which I hold an active license [place]    The patient was identified by name and date of birth  Markus Herndon was informed that this is a telemedicine visit and that the visit is being conducted through the [employer]  She agrees to proceed     My office door was closed  No one else was in the room  She acknowledged consent and understanding of privacy and security of the video platform  The patient has agreed to participate and understands they can discontinue the visit at any time  Patient is aware this is a billable service  Reason for visit is therapy follow up     Encounter provider [facility]    Provider located at [place] [phone]      Recent Visits  No visits were found meeting these conditions  Showing recent visits within past 7 days and meeting all other requirements  Today's Visits  Date Type Provider Dept   06/30/23 Telemedicine [facility]   Showing today's visits and meeting all other requirements  Future Appointments  No visits were found meeting these conditions  Showing future appointments within next 150 days and meeting all other requirements           Visit Time  Total Visit Duration: 53 minutes      Behavioral Health Psychotherapy Assessment    Date of Initial Psychotherapy Assessment: 06/30/23  Referral Source: YUDITH Kline, [facility]  Has a release of information been signed for the referral source? No    Preferred Name: Markus Herndon  Preferred Pronouns: She/her  YOB: 2000 Age: 23 y o    Sex assigned at birth: female   Gender Identity: Female   Race:   Preferred Language: English    Emergency Contact:  Full Name: Misael Longo   Relationship to Client:   Contact information: in cell phone     Primary Care Physician:  No primary care provider on file  No primary provider on file  None  Has a release of information been signed? NA    Physical Health History:  Past surgical procedures: None noted  Do you have a history of any of the following: other Denies  Do you have any mobility issues? No    Relevant Family History:  Alan Gilmore reports that there is a family history of depression/suicide by the maternal great grandfather  Presenting Problem (What brings you in?)  Alan Gilmore reports a history of postpartum depression with her first pregnancy  Mental Health Advance Directive:  Do you currently have a Mental Health Advance Directive?yes    Diagnosis:  No diagnosis found      Initial Assessment:     Current Mental Status:    Appearance: appropriate and casual      Behavior/Manner: cooperative      Affect/Mood:  Depressed and irritable    Speech:  Normal    Sleep:  Normal    Oriented to: oriented to self, oriented to place and oriented to time       Clinical Symptoms    Depression: yes      Depression Symptoms: depressed mood, sleep disturbance and irritable      Anxiety Symptoms: irritable      Have you ever been assaultive to others or the environment: No      Have you ever been self-injurious: No      Counseling History:  Previous Counseling or Treatment  (Mental Health or Drug & Alcohol): No    Have you previously taken psychiatric medications: No      Suicide Risk Assessment  Have you ever had a suicide attempt: No    Have you had incidents of suicidal ideation: No    Are you currently experiencing suicidal thoughts: No      Substance Abuse/Addiction Assessment:  Alcohol: No    Heroin: No    Fentanyl: No    Opiates: No    Cocaine: No    Amphetamines: No    Hallucinogens: No    Club Drugs: No    Benzodiazepines: No    Other Rx Meds: No    Marijuana: No    Tobacco/Nicotine: No    Have you experienced blackouts as a result of substance use: No    Have you had any periods of abstinence: No Have you experienced symptoms of withdrawal: No    Have you ever overdosed on any substances?: No      Compulsive Behaviors:  Compulsive Behavior Information:  Denies     Disordered Eating History:  Do you have a history of disordered eating: No      Social Determinants of Health:    SDOH:  Other    Other SDOH:  Toddler, pregnancy,     Trauma and Abuse History:    Have you ever been abused: No      Legal History:    Have you ever been arrested  or had a DUI: No      Have you been incarcerated: No      Are you currently on parole/probation: No      Any current Children and Youth involvement: No      Any pending legal charges: No      Relationship History:    Current marital status:       Natural Supports:   Mother and siblings    Relationship History:  Sister is very close to her;   MARYJANE is very close to her    Employment History    Are you currently employed: Yes      Sources of income/financial support:  Work     History:      Status: no history of [employer] duty  Educational History:     Have you ever been diagnosed with a learning disability: No      Highest level of education:  Bachelor's Degree    Have you ever had an IEP or 504-plan: No      Do you need assistance with reading or writing: No          Visit start and stop times:    06/30/23  Start Time: 0705   Stop Time: 0758  Total Time: 53 minutes

## 2023-07-04 PROBLEM — Z3A.13 13 WEEKS GESTATION OF PREGNANCY: Status: ACTIVE | Noted: 2023-06-01

## 2023-07-05 ENCOUNTER — INITIAL PRENATAL (OUTPATIENT)
Dept: OBGYN CLINIC | Facility: CLINIC | Age: 23
End: 2023-07-05

## 2023-07-05 VITALS
SYSTOLIC BLOOD PRESSURE: 118 MMHG | TEMPERATURE: 98.1 F | WEIGHT: 161.8 LBS | BODY MASS INDEX: 28.66 KG/M2 | HEART RATE: 85 BPM | DIASTOLIC BLOOD PRESSURE: 70 MMHG

## 2023-07-05 DIAGNOSIS — Z34.80 SUPERVISION OF OTHER NORMAL PREGNANCY, ANTEPARTUM: ICD-10-CM

## 2023-07-05 DIAGNOSIS — O09.291 IUGR (INTRAUTERINE GROWTH RESTRICTION) IN PRIOR PREGNANCY, PREGNANT, FIRST TRIMESTER: ICD-10-CM

## 2023-07-05 DIAGNOSIS — Z87.59 HISTORY OF GESTATIONAL HYPERTENSION: ICD-10-CM

## 2023-07-05 DIAGNOSIS — Z11.3 SCREEN FOR STD (SEXUALLY TRANSMITTED DISEASE): ICD-10-CM

## 2023-07-05 DIAGNOSIS — Z34.91 FIRST TRIMESTER PREGNANCY: Primary | ICD-10-CM

## 2023-07-05 DIAGNOSIS — Z3A.13 13 WEEKS GESTATION OF PREGNANCY: ICD-10-CM

## 2023-07-05 PROCEDURE — 87491 CHLMYD TRACH DNA AMP PROBE: CPT | Performed by: OBSTETRICS & GYNECOLOGY

## 2023-07-05 PROCEDURE — 87591 N.GONORRHOEAE DNA AMP PROB: CPT | Performed by: OBSTETRICS & GYNECOLOGY

## 2023-07-05 PROCEDURE — PNV: Performed by: OBSTETRICS & GYNECOLOGY

## 2023-07-05 NOTE — PATIENT INSTRUCTIONS
Pregnancy at [address] to [address]:   You are now at the end of your first trimester and entering your second trimester. Morning sickness usually goes away by this time. You may have other symptoms such as fatigue, frequent urination, and headaches. You may have gained 2 to 4 pounds by now. DISCHARGE INSTRUCTIONS:   Return to the emergency department if:   You have pain or cramping in your abdomen or low back. You have heavy vaginal bleeding or clotting. You pass material that looks like tissue or large clots. Collect the material and bring it with you. Call your doctor or obstetrician if:   You cannot keep food or drinks down, and you are losing weight. You have light bleeding. You have chills or a fever. You have vaginal itching, burning, or pain. You have yellow, green, white, or foul-smelling vaginal discharge. You have pain or burning when you urinate, less urine than usual, or pink or bloody urine. You have questions or concerns about your condition or care. How to care for yourself at this stage of your pregnancy:       Get plenty of rest.  You may feel more tired than normal. You may need to take naps or go to bed earlier. Manage nausea and vomiting. Avoid fatty and spicy foods. Eat small meals throughout the day instead of large meals. Gladys may help to decrease nausea. Ask your healthcare provider about other ways of decreasing nausea and vomiting. Eat a variety of healthy foods. Healthy foods include fruits, vegetables, whole-grain breads, low-fat dairy foods, beans, lean meats, and fish. Drink liquids as directed. Ask how much liquid to drink each day and which liquids are best for you. Limit caffeine to less than 200 milligrams each day. Limit your intake of fish to 2 servings each week. Choose fish low in mercury such as canned light tuna, shrimp, salmon, cod, or tilapia.  Do not  eat fish high in mercury such as swordfish, tilefish, lyndsay mackerel, and shark. Take prenatal vitamins as directed. Your need for certain vitamins and minerals, such as folic acid, increases during pregnancy. Prenatal vitamins provide some of the extra vitamins and minerals you need. Prenatal vitamins may also help to decrease the risk of certain birth defects. Do not smoke. Smoking increases your risk of a miscarriage and other health problems during your pregnancy. Smoking can cause your baby to be born too early or weigh less at birth. Ask your healthcare provider for information if you need help quitting. Do not drink alcohol. Alcohol passes from your body to your baby through the placenta. It can affect your baby's brain development and cause fetal alcohol syndrome (FAS). FAS is a group of conditions that causes mental, behavior, and growth problems. Talk to your healthcare provider before you take any medicines. Many medicines may harm your baby if you take them when you are pregnant. Do not take any medicines, vitamins, herbs, or supplements without first talking to your healthcare provider. Never use illegal or street drugs (such as marijuana or cocaine) while you are pregnant. Safety tips during pregnancy:   Avoid hot tubs and saunas. Do not use a hot tub or sauna while you are pregnant, especially during your first trimester. Hot tubs and saunas may raise your baby's temperature and increase the risk of birth defects. Avoid toxoplasmosis. This is an infection caused by eating raw meat or being around infected cat feces. It can cause birth defects, miscarriages, and other problems. Wash your hands after you touch raw meat. Make sure any meat is well-cooked before you eat it. Avoid raw eggs and unpasteurized milk. Use gloves or ask someone else to clean your cat's litter box while you are pregnant. Changes happening with your baby: Your baby has fully formed fingernails and toenails. Your baby's heartbeat can now be heard.  Ask your healthcare provider if you can listen to your baby's heartbeat. By week 14, your baby is over 4 inches long from the top of the head to the rump (baby's bottom). Your baby weighs over 3 ounces. Prenatal care:  Prenatal care is a series of visits with your healthcare provider throughout your pregnancy. During the first 28 weeks of your pregnancy, you will see your healthcare provider 1 time each month. Prenatal care can help prevent problems during pregnancy and childbirth. Your healthcare provider will check your blood pressure and weight. Your baby's heart rate will also be checked. You may also need the following at some visits:  A pelvic exam  allows your healthcare provider to see your cervix (the bottom part of your uterus). Your healthcare provider will use a speculum to open your vagina. He or she will check the size and shape of your uterus. Blood tests  may be done to check for any of the following:    Gestational diabetes or anemia (low iron level)    Blood type or Rh factor, or certain birth defects    Immunity to certain diseases, such as chickenpox or rubella    An infection, such as a sexually transmitted infection, HIV, or hepatitis B    Hepatitis B  may need to be prevented or treated. Hepatitis B is inflammation of the liver caused by the hepatitis B virus (HBV). HBV can spread from a mother to her baby during delivery. You will be checked for HBV as early as possible in the first trimester of each pregnancy. You need the test even if you received the hepatitis B vaccine or were tested before. You may need to have an HBV infection treated before you give birth. Urine tests  may also be done to check for sugar and protein. These can be signs of gestational diabetes or preeclampsia. Urine tests may also be done to check for signs of infection. A fetal ultrasound  shows pictures of your baby inside your uterus.  The pictures are used to check your baby's development, movement, and position. Genetic disorder screening tests  may be offered to you. These tests check your baby's risk for genetic disorders such as Down syndrome. A screening test includes a blood test and ultrasound. Follow up with your doctor or obstetrician as directed:  Go to all prenatal visits. Write down your questions so you remember to ask them during your visits. © Copyright Naila Hodgson 2022 Information is for End User's use only and may not be sold, redistributed or otherwise used for commercial purposes. The above information is an  only. It is not intended as medical advice for individual conditions or treatments. Talk to your doctor, nurse or pharmacist before following any medical regimen to see if it is safe and effective for you.

## 2023-07-05 NOTE — PROGRESS NOTES
OB/GYN  PRENATAL H&P VISIT  [place]  2023  10:03 PM  Dr. Dez Carbajal MD      SUBJECTIVE  Patient is here for initial PE.  OB history was reviewed. [id number]  Estimated Date of Delivery: 24  13w4d      Bleeding no  Cramping/Pelvic Pain no  Abnormal Discharge no  Nausea yes  Vomiting yes    OB History    Para Term  AB Living   2 1 1 0 0 1   SAB IAB Ectopic Multiple Live Births   0 0 0 0 1      # Outcome Date GA Lbr Parth/2nd Weight Sex Delivery Anes PTL Lv   2 Current            1 Term 10/12/21 39w2d / 00:28 2905 g (6 lb 6.5 oz) F Vag-Spont EPI N JONG      Birth Comments: Followed for possible IUGR with normal Dopplers in pregnancy. Developed COVID in the second trimester. Had mild gestational hypertension in labor that did not require medical management and resolved after delivery      Name: Marlene NG)      Apgar1: 9  Apgar5: 9       Review of Systems   Constitutional: Negative. HENT: Negative. Eyes: Negative. Respiratory: Negative. Cardiovascular: Negative. Gastrointestinal: Negative. Endocrine: Negative. Genitourinary:        As noted in HPI   Musculoskeletal: Negative. Skin: Negative. Allergic/Immunologic: Negative. Neurological: Negative. Hematological: Negative. Psychiatric/Behavioral: Negative.         Past Medical History:   Diagnosis Date   • Depression        Past Surgical History:   Procedure Laterality Date   • FOOT SURGERY      fracture from gymnastics       Social History     Socioeconomic History   • Marital status: Single     Spouse name: Not on file   • Number of children: Not on file   • Years of education: Not on file   • Highest education level: Not on file   Occupational History   • Not on file   Tobacco Use   • Smoking status: Never   • Smokeless tobacco: Never   Vaping Use   • Vaping Use: Never used   Substance and Sexual Activity   • Alcohol use: Not Currently   • Drug use: Never   • Sexual activity: Not Currently Partners: Male   Other Topics Concern   • Not on file   Social History Narrative   • Not on file     Social Determinants of Health     Financial Resource Strain: Not on file   Food Insecurity: Not on file   Transportation Needs: Not on file   Physical Activity: Not on file   Stress: Not on file   Social Connections: Not on file   Intimate Partner Violence: Not on file   Housing Stability: Not on file       OBJECTIVE    LMP 03/21/2023       Physical Exam  Constitutional:       Appearance: Normal appearance. Genitourinary:   Breasts:     Right: Normal. No swelling, bleeding, inverted nipple, mass, nipple discharge, skin change or tenderness. Left: Normal. No bleeding, inverted nipple, mass, nipple discharge, skin change or tenderness. HENT:      Head: Normocephalic. Nose: Nose normal.   Eyes:      Conjunctiva/sclera: Conjunctivae normal.   Cardiovascular:      Rate and Rhythm: Normal rate. Heart sounds: Normal heart sounds. Pulmonary:      Effort: Pulmonary effort is normal.      Breath sounds: Normal breath sounds. Chest:      Chest wall: No mass, lacerations, deformity, swelling, tenderness, crepitus or edema. Abdominal:      General: There is no distension. Palpations: Abdomen is soft. Tenderness: There is no abdominal tenderness. Musculoskeletal:      Cervical back: Neck supple. Lymphadenopathy:      Cervical: No cervical adenopathy. Upper Body:      Right upper body: No supraclavicular or axillary adenopathy. Left upper body: No supraclavicular or axillary adenopathy. Neurological:      General: No focal deficit present. Mental Status: She is alert and oriented to person, place, and time. Skin:     General: Skin is warm and dry. Psychiatric:         Mood and Affect: Mood normal.         Behavior: Behavior normal.         Thought Content: Thought content normal.   Vitals reviewed.                ASSESSMENT AND PLAN    Problem List        Other Supervision of other normal pregnancy, antepartum    Overview       CF/ SMA testing: declines  Covid vaccine: not completed          History of gestational hypertension    Overview     Developed in labor and did not require medical management and resolved after birth. Baseline urine TP/ creatinine at 12 wks:  0.12         IUGR (intrauterine growth restriction) in prior pregnancy, pregnant, first trimester    Overview     Weighed 6 pounds 6 ounces at birth. Delivered at 39 weeks. Had normal umb Dopplers in pregnancy. 12 weeks gestation of pregnancy    Depression    Overview     Referral sent to behavioral health         Depression affecting pregnancy in first trimester, antepartum            1. Pregnancy: H&P completed today. PN Labs reviewed today. Prenatal course discussed with patient, including appointment schedule. Warning signs discussed and reviewed. 2. Screening: Pap smear 4/15/21 NILM ([facility]). Pap not indicated. Patient declined pelvic exam.  She agreed to gonorrhea and chlamydia PCR via urine    3. Genetic testing: declines    4. Follow up: Return in 4 weeks.  .    5.  Start low-dose aspirin for prevention of preeclampsia due to history of gestational hypertension      Future Appointments   Date Time Provider [address] [place] [address]   2023  9:15 AM Alona Mcclure MD [facility]   2023  7:00 AM Nithin Sanchez LPC [facility]   2023  2:30 PM  US 1 Elysia Mcclure MD  10:03 PM

## 2023-07-06 LAB
C TRACH DNA SPEC QL NAA+PROBE: NEGATIVE
N GONORRHOEA DNA SPEC QL NAA+PROBE: NEGATIVE

## 2023-08-03 ENCOUNTER — ROUTINE PRENATAL (OUTPATIENT)
Dept: OBGYN CLINIC | Facility: CLINIC | Age: 23
End: 2023-08-03
Payer: COMMERCIAL

## 2023-08-03 VITALS
HEART RATE: 70 BPM | TEMPERATURE: 97.8 F | OXYGEN SATURATION: 99 % | DIASTOLIC BLOOD PRESSURE: 78 MMHG | SYSTOLIC BLOOD PRESSURE: 122 MMHG | BODY MASS INDEX: 29.52 KG/M2 | WEIGHT: 166.6 LBS | HEIGHT: 63 IN

## 2023-08-03 DIAGNOSIS — Z87.59 HISTORY OF GESTATIONAL HYPERTENSION: ICD-10-CM

## 2023-08-03 DIAGNOSIS — Z3A.17 17 WEEKS GESTATION OF PREGNANCY: Primary | ICD-10-CM

## 2023-08-03 DIAGNOSIS — Z34.80 SUPERVISION OF OTHER NORMAL PREGNANCY, ANTEPARTUM: ICD-10-CM

## 2023-08-03 DIAGNOSIS — O09.292 IUGR (INTRAUTERINE GROWTH RESTRICTION) IN PRIOR PREGNANCY, PREGNANT, SECOND TRIMESTER: ICD-10-CM

## 2023-08-03 PROBLEM — F32.A DEPRESSION: Status: RESOLVED | Noted: 2023-06-15 | Resolved: 2023-08-03

## 2023-08-03 PROCEDURE — 99213 OFFICE O/P EST LOW 20 MIN: CPT | Performed by: OBSTETRICS & GYNECOLOGY

## 2023-08-03 NOTE — ASSESSMENT & PLAN NOTE
Did start therapy although it is somewhat expensive. States overall mood is much better - thinks depression was instigated partly by weaning her daughter.  It has been awhile now since weaning and since then things are better

## 2023-08-03 NOTE — PROGRESS NOTES
S: 23 y.o. Bill Benjamin 17w6d here for routine prenatal visit. Chief Complaint   Patient presents with   • Routine Prenatal Visit     No concerns         OB complaints:  Contractions: no  Leakage: no  Bleeding: no  Fetal movement: yes    Nausea improving. Not vomiting in past 5 days       O:  /78 (BP Location: Right arm, Patient Position: Sitting, Cuff Size: Adult)   Pulse 70   Temp 97.8 °F (36.6 °C) (Tympanic)   Ht 5' 3" (1.6 m)   Wt 75.6 kg (166 lb 9.6 oz)   LMP 2023   SpO2 99%   BMI 29.51 kg/m²       Review of Systems   Constitutional: Negative for chills and fever. Eyes: Negative for visual disturbance. Respiratory: Negative for chest tightness and shortness of breath. Cardiovascular: Negative for chest pain. Gastrointestinal: Negative for abdominal pain, diarrhea, nausea and vomiting. Genitourinary: Negative for pelvic pain and vaginal bleeding. As noted in HPI   Skin: Negative for rash. Neurological: Negative for headaches. All other systems reviewed and are negative. Physical Exam  Constitutional:       General: She is not in acute distress. Appearance: Normal appearance. HENT:      Head: Normocephalic and atraumatic. Cardiovascular:      Rate and Rhythm: Normal rate. Pulmonary:      Effort: Pulmonary effort is normal. No respiratory distress. Abdominal:      General: There is no distension. Palpations: Abdomen is soft. Tenderness: There is no abdominal tenderness. There is no guarding or rebound. Neurological:      General: No focal deficit present. Mental Status: She is alert. Psychiatric:         Mood and Affect: Mood normal.         Behavior: Behavior normal.   Vitals and nursing note reviewed.               Fetal Heart Rate: 150    Pregravid Weight/BMI: 70.8 kg (156 lb) (BMI 27.64)  Current Weight: 75.6 kg (166 lb 9.6 oz)   Total Weight Gain: 4.808 kg (10 lb 9.6 oz)     Pre- Vitals    Flowsheet Row Most Recent Value Prenatal Assessment    Fetal Heart Rate 150   Movement Present   Prenatal Vitals    Blood Pressure 122/78   Weight - Scale 75.6 kg (166 lb 9.6 oz)   Urine Albumin/Glucose    Dilation/Effacement/Station    Vaginal Drainage    Edema                   Problem List        Other    Supervision of other normal pregnancy, antepartum    Overview       CF/ SMA testing: declines  Covid vaccine: not completed   Declines aneuploidy, NTD screening          Current Assessment & Plan     Reviewed msAFP for NTD screening - pt declines   Anatomy US scheduled  OB precautions reviewed         History of gestational hypertension    Overview     Developed in labor and did not require medical management and resolved after birth. Baseline urine TP/ creatinine at 12 wks:  0.12  Low-dose aspirin until 36 weeks         Current Assessment & Plan     Taking aspirin          IUGR (intrauterine growth restriction) in prior pregnancy, pregnant, second trimester    Overview     Weighed 6 pounds 6 ounces at birth. Delivered at 39 weeks. Had normal umb Dopplers in pregnancy. - growth US          17 weeks gestation of pregnancy    Depression affecting pregnancy in first trimester, antepartum    Current Assessment & Plan     Did start therapy although it is somewhat expensive. States overall mood is much better - thinks depression was instigated partly by weaning her daughter.  It has been awhile now since weaning and since then things are better                              Russell South MD  8/3/2023  2:13 PM

## 2023-08-24 ENCOUNTER — ROUTINE PRENATAL (OUTPATIENT)
Dept: PERINATAL CARE | Facility: OTHER | Age: 23
End: 2023-08-24
Payer: COMMERCIAL

## 2023-08-24 VITALS
DIASTOLIC BLOOD PRESSURE: 60 MMHG | HEART RATE: 91 BPM | BODY MASS INDEX: 30.23 KG/M2 | SYSTOLIC BLOOD PRESSURE: 102 MMHG | HEIGHT: 63 IN | WEIGHT: 170.6 LBS

## 2023-08-24 DIAGNOSIS — Z36.86 ENCOUNTER FOR ANTENATAL SCREENING FOR CERVICAL LENGTH: ICD-10-CM

## 2023-08-24 DIAGNOSIS — Z3A.20 20 WEEKS GESTATION OF PREGNANCY: ICD-10-CM

## 2023-08-24 DIAGNOSIS — O43.192 MARGINAL INSERTION OF UMBILICAL CORD AFFECTING MANAGEMENT OF MOTHER IN SECOND TRIMESTER: ICD-10-CM

## 2023-08-24 DIAGNOSIS — O09.292 IUGR (INTRAUTERINE GROWTH RESTRICTION) IN PRIOR PREGNANCY, PREGNANT, SECOND TRIMESTER: Primary | ICD-10-CM

## 2023-08-24 PROCEDURE — 76811 OB US DETAILED SNGL FETUS: CPT | Performed by: OBSTETRICS & GYNECOLOGY

## 2023-08-24 PROCEDURE — 76817 TRANSVAGINAL US OBSTETRIC: CPT | Performed by: OBSTETRICS & GYNECOLOGY

## 2023-08-24 NOTE — PROGRESS NOTES
Ultrasound Probe Disinfection    A transvaginal ultrasound was performed. Prior to use, disinfection was performed with High Level Disinfection Process ([employer]). Probe serial number [id number]: [id number]  was used.       Ever Quiroz  08/24/23  2:42 PM

## 2023-08-24 NOTE — PROGRESS NOTES
The patient was seen today for an ultrasound. Please see ultrasound report (located under Ob Procedures) for additional details. Thank you very much for allowing us to participate in the care of this very nice patient. Should you have any questions, please do not hesitate to contact me. On exam today, the patient appears well, in no acute distress, and denies any complaints. Zeeshan Espinosa previously declined genetic screening. The fetal anatomic survey is complete. There is no sonographic evidence of fetal abnormalities at this time. Good fetal movement and tone are seen. The amniotic fluid volume appears normal. A transvaginal ultrasound was performed to assess the cervix, placenta, and PCI , which was not seen well transabdominally. The cervical length was 4.14 centimeters, which is normal for the current gestational age. There was no significant funneling or dynamic changes appreciated. The placenta is not low-lying as it measures greater than 2 cm from the internal os. A marginal placental cord insertion site is identified emanating from the inferior aspect of the placenta and on the left lateral aspect and is greater than 4 cm from the internal os. Free loops of cord are noted in the lower uterine segment. The patient was informed of today's findings and all of her questions were answered. The limitations of ultrasound were reviewed. We discussed today's findings of an inferior marginal placental cord insertion site without evidence of low-lying placenta or vasa previa. We reviewed that she may be at increased risk for a funic presentation and  I recommend follow-up at around 32 weeks to assess fetal growth as well as umbilical cord location. Thank you very much for allowing us to participate in the care of this very nice patient. Should you have any questions, please do not hesitate to contact our office. Dil[place] [id number] [facility]  Attending Physician, Maternal-Fetal Medicine  [facility] \[facility]\""

## 2023-08-30 ENCOUNTER — ROUTINE PRENATAL (OUTPATIENT)
Dept: OBGYN CLINIC | Facility: CLINIC | Age: 23
End: 2023-08-30

## 2023-08-30 VITALS
DIASTOLIC BLOOD PRESSURE: 76 MMHG | WEIGHT: 171.4 LBS | SYSTOLIC BLOOD PRESSURE: 118 MMHG | HEIGHT: 63 IN | HEART RATE: 102 BPM | BODY MASS INDEX: 30.37 KG/M2

## 2023-08-30 DIAGNOSIS — Z87.59 HISTORY OF GESTATIONAL HYPERTENSION: ICD-10-CM

## 2023-08-30 DIAGNOSIS — Z3A.21 21 WEEKS GESTATION OF PREGNANCY: ICD-10-CM

## 2023-08-30 DIAGNOSIS — O09.292 IUGR (INTRAUTERINE GROWTH RESTRICTION) IN PRIOR PREGNANCY, PREGNANT, SECOND TRIMESTER: ICD-10-CM

## 2023-08-30 DIAGNOSIS — Z34.80 SUPERVISION OF OTHER NORMAL PREGNANCY, ANTEPARTUM: ICD-10-CM

## 2023-08-30 DIAGNOSIS — O43.192 MARGINAL INSERTION OF UMBILICAL CORD AFFECTING MANAGEMENT OF MOTHER IN SECOND TRIMESTER: Primary | ICD-10-CM

## 2023-08-30 PROCEDURE — PNV: Performed by: OBSTETRICS & GYNECOLOGY

## 2023-08-30 NOTE — ASSESSMENT & PLAN NOTE
Reviewed normal anatomy US, has growth US scheduled to f/u cord insertion site  OB precautions reviewed

## 2023-08-30 NOTE — PROGRESS NOTES
S: 23 y.o. [id number] 21w5d here for routine prenatal visit. Chief Complaint   Patient presents with   • Routine Prenatal Visit         OB complaints:  Contractions: no  Leakage: no  Bleeding: no   Fetal movement: yes      O:  /76   Pulse 102   Ht 5' 3" (1.6 m)   Wt 77.7 kg (171 lb 6.4 oz)   LMP 2023   BMI 30.36 kg/m²       Review of Systems   Constitutional: Negative for chills and fever. Eyes: Negative for visual disturbance. Respiratory: Negative for chest tightness and shortness of breath. Cardiovascular: Negative for chest pain. Gastrointestinal: Negative for abdominal pain, diarrhea, nausea and vomiting. Genitourinary: Negative for pelvic pain and vaginal bleeding. As noted in HPI   Skin: Negative for rash. Neurological: Negative for headaches. All other systems reviewed and are negative. Physical Exam  Constitutional:       General: She is not in acute distress. Appearance: Normal appearance. HENT:      Head: Normocephalic and atraumatic. Cardiovascular:      Rate and Rhythm: Normal rate. Pulmonary:      Effort: Pulmonary effort is normal. No respiratory distress. Abdominal:      General: There is no distension. Palpations: Abdomen is soft. Tenderness: There is no abdominal tenderness. There is no guarding or rebound. Comments: gravid   Neurological:      General: No focal deficit present. Mental Status: She is alert. Psychiatric:         Mood and Affect: Mood normal.         Behavior: Behavior normal.   Vitals and nursing note reviewed.            Fundal Height (cm): 21 cm  Fetal Heart Rate: 135    Pregravid Weight/BMI: 70.8 kg (156 lb) (BMI 27.64)  Current Weight: 77.7 kg (171 lb 6.4 oz)   Total Weight Gain: 6.985 kg (15 lb 6.4 oz)     Pre- Vitals    Flowsheet Row Most Recent Value   Prenatal Assessment    Fetal Heart Rate 135   Fundal Height (cm) 21 cm   Movement Present   Prenatal Vitals    Blood Pressure 118/76 Weight - Scale 77.7 kg (171 lb 6.4 oz)   Urine Albumin/Glucose    Dilation/Effacement/Station    Vaginal Drainage    Edema                 Problem List        Other    Supervision of other normal pregnancy, antepartum    Overview       CF/ SMA testing: declines  Covid vaccine: not completed   Declines aneuploidy, NTD screening          Current Assessment & Plan     Reviewed normal anatomy US, has growth US scheduled to f/u cord insertion site  OB precautions reviewed         History of gestational hypertension    Overview     Developed in labor and did not require medical management and resolved after birth. Baseline urine TP/ creatinine at 12 wks:  0.12  Low-dose aspirin until 36 weeks         IUGR (intrauterine growth restriction) in prior pregnancy, pregnant, second trimester    Overview     Weighed 6 pounds 6 ounces at birth. Delivered at 39 weeks. Had normal umb Dopplers in pregnancy. - growth US          21 weeks gestation of pregnancy    Depression affecting pregnancy in first trimester, antepartum    Marginal insertion of umbilical cord affecting management of mother in second trimester    Overview     Inferior marginal placental cord insertion site without evidence of low-lying placenta or vasa previa. We reviewed that she may be at increased risk for a funic presentation and  I recommend follow-up at around 32 weeks to assess fetal growth as well as umbilical cord location.                               Lucretia Purcell MD  8/30/2023  8:45 AM

## 2023-09-25 PROBLEM — Z3A.25 25 WEEKS GESTATION OF PREGNANCY: Status: ACTIVE | Noted: 2023-06-01

## 2023-09-25 NOTE — PROGRESS NOTES
OB/GYN  PN Visit  Cecelia Carvajal  [id number]  2023  8:32 AM  Dr. Dolly Vieira MD    S: 23 y.o. [id number] 25w5d here for PN visit. Chief Complaint   Patient presents with   • Routine Prenatal Visit     No concerns         OB complaints:  Contractions: no  Leakage: no  Bleeding: no   Fetal movement: yes      O:  /70   Pulse (!) 106   Ht 5' 3" (1.6 m)   Wt 78.5 kg (173 lb)   LMP 2023   SpO2 99%   BMI 30.65 kg/m²       Review of Systems   Constitutional: Negative. HENT: Negative. Eyes: Negative. Respiratory: Negative. Cardiovascular: Negative. Gastrointestinal: Negative. Endocrine: Negative. Genitourinary:        As noted in HPI   Musculoskeletal: Negative. Skin: Negative. Allergic/Immunologic: Negative. Neurological: Negative. Hematological: Negative. Psychiatric/Behavioral: Negative. Physical Exam  Constitutional:       General: She is not in acute distress. Appearance: She is well-developed. Abdominal:      Palpations: Abdomen is soft. Tenderness: There is no abdominal tenderness. There is no guarding. Neurological:      Mental Status: She is alert and oriented to person, place, and time. Skin:     General: Skin is warm and dry.    Psychiatric:         Behavior: Behavior normal.             Pregravid Weight/BMI: 70.8 kg (156 lb) (BMI 27.64)  Current Weight: 78.5 kg (173 lb)   Total Weight Gain: 7.711 kg (17 lb)   Pre- Vitals    Flowsheet Row Most Recent Value   Prenatal Assessment    Fetal Heart Rate 150   Fundal Height (cm) 24 cm   Movement Present   Prenatal Vitals    Blood Pressure 118/70   Weight - Scale 78.5 kg (173 lb)   Urine Albumin/Glucose    Dilation/Effacement/Station    Vaginal Drainage    Edema            Problem List        Other    Supervision of other normal pregnancy, antepartum    Overview       CF/ SMA testing: declines  Covid vaccine: not completed   Declines aneuploidy, NTD screening          History of gestational hypertension    Overview     Developed in labor and did not require medical management and resolved after birth. Baseline urine TP/ creatinine at 12 wks:  0.12  Low-dose aspirin until 36 weeks         IUGR (intrauterine growth restriction) in prior pregnancy, pregnant, second trimester    Overview     Weighed 6 pounds 6 ounces at birth. Delivered at 39 weeks. Had normal umb Dopplers in pregnancy. - growth US          25 weeks gestation of pregnancy    Current Assessment & Plan     Declines glucola - will do accuchecks for 1 week  Declines TDAP and flu         Depression affecting pregnancy in first trimester, antepartum    Marginal insertion of umbilical cord affecting management of mother in second trimester    Overview     Inferior marginal placental cord insertion site without evidence of low-lying placenta or vasa previa. We reviewed that she may be at increased risk for a funic presentation and  I recommend follow-up at around 32 weeks to assess fetal growth as well as umbilical cord location.         Other Visit Diagnoses     Second trimester pregnancy    -  Primary         32 week US to check cord and placenta and growth  LSASA until 36 weeks  Declines GLUCOLA - she will check her blood sugars for 1 week  FBS and 2 hour pp      Future Appointments   Date Time Provider [address] [place] [address]   2023  2:00 PM  US [address] [place] [address]               Karyle Fort, MD  2023  8:32 AM

## 2023-09-25 NOTE — PATIENT INSTRUCTIONS
Pregnancy at 23 to 26 Two [place]:   You are now close to or at the beginning of the third trimester. The third trimester starts at 24 weeks and ends with delivery. As your baby gets larger, you may develop certain symptoms. These may include pain in your back or down the sides of your abdomen. You may also have stretch marks on your abdomen, breasts, thighs, or buttocks. You may also have constipation. DISCHARGE INSTRUCTIONS:   Return to the emergency department if:   You develop a severe headache that does not go away. You have new or increased vision changes, such as blurred or spotted vision. You have new or increased swelling in your face or hands. You have vaginal spotting or bleeding. Your water broke or you feel warm water gushing or trickling from your vagina. Call your doctor or obstetrician if:   You have abdominal cramps, pressure, or tightening. You have a change in vaginal discharge. You have light bleeding. You have chills or a fever. You have vaginal itching, burning, or pain. You have yellow, green, white, or foul-smelling vaginal discharge. You have pain or burning when you urinate, less urine than usual, or pink or bloody urine. You have questions or concerns about your condition or care. How to care for yourself at this stage of your pregnancy:       Eat a variety of healthy foods. Healthy foods include fruits, vegetables, whole-grain breads, low-fat dairy foods, beans, lean meats, and fish. Drink liquids as directed. Ask how much liquid to drink each day and which liquids are best for you. Limit caffeine to less than 200 milligrams each day. Limit your intake of fish to 2 servings each week. Choose fish low in mercury such as canned light tuna, shrimp, salmon, cod, or tilapia. Do not  eat fish high in mercury such as swordfish, tilefish, lyndsay mackerel, and shark. Manage back pain.   Do not stand for long periods of time or lift heavy items. Use good posture while you stand, squat, or bend. Wear low-heeled shoes with good support. Rest may also help to relieve back pain. Ask your healthcare provider about exercises you can do to strengthen your back muscles. Take prenatal vitamins as directed. Your need for certain vitamins and minerals, such as folic acid, increases during pregnancy. Prenatal vitamins provide some of the extra vitamins and minerals you need. Prenatal vitamins may also help to decrease the risk of certain birth defects. Talk to your healthcare provider about exercise. Moderate exercise can help you stay fit. Your healthcare provider will help you plan an exercise program that is safe for you during pregnancy. Do not smoke. Smoking increases your risk of a miscarriage and other health problems during your pregnancy. Smoking can cause your baby to be born too early or weigh less at birth. Ask your healthcare provider for information if you need help quitting. Do not drink alcohol. Alcohol passes from your body to your baby through the placenta. It can affect your baby's brain development and cause fetal alcohol syndrome (FAS). FAS is a group of conditions that causes mental, behavior, and growth problems. Talk to your healthcare provider before you take any medicines. Many medicines may harm your baby if you take them when you are pregnant. Do not take any medicines, vitamins, herbs, or supplements without first talking to your healthcare provider. Never use illegal or street drugs (such as marijuana or cocaine) while you are pregnant. Safety tips:   Avoid hot tubs and saunas. Do not use a hot tub or sauna while you are pregnant, especially during your first trimester. Hot tubs and saunas may raise your baby's temperature and increase the risk of birth defects. Avoid toxoplasmosis. This is an infection caused by eating raw meat or being around infected cat feces.  It can cause birth defects, miscarriages, and other problems. Wash your hands after you touch raw meat. Make sure any meat is well-cooked before you eat it. Avoid raw eggs and unpasteurized milk. Use gloves or ask someone else to clean your cat's litter box while you are pregnant. Changes that are happening with your baby:  By 26 weeks, your baby will weigh about 2 pounds. Your baby will be about 10 inches long from the top of the head to the rump (baby's bottom). Your baby's movements are much stronger now. Your baby's eyes are almost completely formed and can partially open. Your baby also sleeps and wakes up. What you need to know about prenatal care: Your healthcare provider will check your blood pressure and weight. You may also need the following:  A urine test  may also be done to check for sugar and protein. These can be signs of gestational diabetes or infection. Protein in your urine may also be a sign of preeclampsia. Preeclampsia is a condition that can develop during week 20 or later of your pregnancy. It causes high blood pressure, and it can cause problems with your kidneys and other organs. A gestational diabetes screen  may be done. Your healthcare provider may order either a 1-step or 2-step oral glucose tolerance test (OGTT). 1-step OGTT:  Your blood sugar level will be tested after you have not eaten for 8 hours (fasting). You will then be given a glucose drink. Your level will be tested again 1 hour and 2 hours after you finish the drink. 2-step OGTT:  You do not have to fast for the first part of the test. You will have the glucose drink at any time of day. Your blood sugar level will be checked 1 hour later. If your blood sugar is higher than a certain level, another test will be ordered. You will fast and your blood sugar level will be tested. You will have the glucose drink. Your blood will be tested again 1 hour, 2 hours, and 3 hours after you finish the glucose drink.     Fundal height  is a measurement of your uterus to check your baby's growth. This number is usually the same as the number of weeks that you have been pregnant. Your baby's heart rate  will be checked. Follow up with your doctor or obstetrician as directed:  Write down your questions so you remember to ask them during your visits. © Copyright [employer] 2023 Information is for End User's use only and may not be sold, redistributed or otherwise used for commercial purposes. The above information is an  only. It is not intended as medical advice for individual conditions or treatments. Talk to your doctor, nurse or pharmacist before following any medical regimen to see if it is safe and effective for you.

## 2023-09-27 ENCOUNTER — ROUTINE PRENATAL (OUTPATIENT)
Dept: OBGYN CLINIC | Facility: CLINIC | Age: 23
End: 2023-09-27

## 2023-09-27 ENCOUNTER — APPOINTMENT (OUTPATIENT)
Dept: LAB | Facility: CLINIC | Age: 23
End: 2023-09-27
Payer: COMMERCIAL

## 2023-09-27 VITALS
SYSTOLIC BLOOD PRESSURE: 118 MMHG | WEIGHT: 173 LBS | HEART RATE: 106 BPM | BODY MASS INDEX: 30.65 KG/M2 | DIASTOLIC BLOOD PRESSURE: 70 MMHG | HEIGHT: 63 IN | OXYGEN SATURATION: 99 %

## 2023-09-27 DIAGNOSIS — Z34.92 SECOND TRIMESTER PREGNANCY: Primary | ICD-10-CM

## 2023-09-27 DIAGNOSIS — Z34.80 SUPERVISION OF OTHER NORMAL PREGNANCY, ANTEPARTUM: ICD-10-CM

## 2023-09-27 DIAGNOSIS — Z87.59 HISTORY OF GESTATIONAL HYPERTENSION: ICD-10-CM

## 2023-09-27 DIAGNOSIS — Z34.92 SECOND TRIMESTER PREGNANCY: ICD-10-CM

## 2023-09-27 DIAGNOSIS — O43.192 MARGINAL INSERTION OF UMBILICAL CORD AFFECTING MANAGEMENT OF MOTHER IN SECOND TRIMESTER: ICD-10-CM

## 2023-09-27 DIAGNOSIS — O09.292 IUGR (INTRAUTERINE GROWTH RESTRICTION) IN PRIOR PREGNANCY, PREGNANT, SECOND TRIMESTER: ICD-10-CM

## 2023-09-27 DIAGNOSIS — Z3A.25 25 WEEKS GESTATION OF PREGNANCY: ICD-10-CM

## 2023-09-27 LAB
BASOPHILS # BLD AUTO: 0.03 THOUSANDS/ÂΜL (ref 0–0.1)
BASOPHILS NFR BLD AUTO: 0 % (ref 0–1)
EOSINOPHIL # BLD AUTO: 0.22 THOUSAND/ÂΜL (ref 0–0.61)
EOSINOPHIL NFR BLD AUTO: 3 % (ref 0–6)
ERYTHROCYTE [DISTWIDTH] IN BLOOD BY AUTOMATED COUNT: 13.7 % (ref 11.6–15.1)
HCT VFR BLD AUTO: 35.5 % (ref 34.8–46.1)
HGB BLD-MCNC: 12 G/DL (ref 11.5–15.4)
IMM GRANULOCYTES # BLD AUTO: 0.07 THOUSAND/UL (ref 0–0.2)
IMM GRANULOCYTES NFR BLD AUTO: 1 % (ref 0–2)
LYMPHOCYTES # BLD AUTO: 1.91 THOUSANDS/ÂΜL (ref 0.6–4.47)
LYMPHOCYTES NFR BLD AUTO: 23 % (ref 14–44)
MCH RBC QN AUTO: 28.6 PG (ref 26.8–34.3)
MCHC RBC AUTO-ENTMCNC: 33.8 G/DL (ref 31.4–37.4)
MCV RBC AUTO: 85 FL (ref 82–98)
MONOCYTES # BLD AUTO: 0.73 THOUSAND/ÂΜL (ref 0.17–1.22)
MONOCYTES NFR BLD AUTO: 9 % (ref 4–12)
NEUTROPHILS # BLD AUTO: 5.47 THOUSANDS/ÂΜL (ref 1.85–7.62)
NEUTS SEG NFR BLD AUTO: 64 % (ref 43–75)
NRBC BLD AUTO-RTO: 0 /100 WBCS
PLATELET # BLD AUTO: 233 THOUSANDS/UL (ref 149–390)
PMV BLD AUTO: 11.3 FL (ref 8.9–12.7)
RBC # BLD AUTO: 4.19 MILLION/UL (ref 3.81–5.12)
TREPONEMA PALLIDUM IGG+IGM AB [PRESENCE] IN SERUM OR PLASMA BY IMMUNOASSAY: NORMAL
WBC # BLD AUTO: 8.43 THOUSAND/UL (ref 4.31–10.16)

## 2023-09-27 PROCEDURE — PNV: Performed by: OBSTETRICS & GYNECOLOGY

## 2023-09-27 PROCEDURE — 86780 TREPONEMA PALLIDUM: CPT

## 2023-09-27 PROCEDURE — 85025 COMPLETE CBC W/AUTO DIFF WBC: CPT

## 2023-09-27 PROCEDURE — 36415 COLL VENOUS BLD VENIPUNCTURE: CPT

## 2023-10-13 PROBLEM — O43.193 MARGINAL INSERTION OF UMBILICAL CORD AFFECTING MANAGEMENT OF MOTHER IN THIRD TRIMESTER: Status: ACTIVE | Noted: 2023-08-24

## 2023-10-13 PROBLEM — O09.293 IUGR (INTRAUTERINE GROWTH RESTRICTION) IN PRIOR PREGNANCY, PREGNANT, THIRD TRIMESTER: Status: ACTIVE | Noted: 2023-05-02

## 2023-10-13 PROBLEM — O99.340 DEPRESSION AFFECTING PREGNANCY: Status: ACTIVE | Noted: 2023-06-30

## 2023-10-13 PROBLEM — Z3A.28 28 WEEKS GESTATION OF PREGNANCY: Status: ACTIVE | Noted: 2023-06-01

## 2023-10-13 NOTE — PATIENT INSTRUCTIONS
Pregnancy at 27 to 30 Two [place]:   You may notice new symptoms such as shortness of breath, heartburn, or swelling of your ankles and feet. You may also have trouble sleeping or contractions. DISCHARGE INSTRUCTIONS:   Return to the emergency department if:   You develop a severe headache that does not go away. You have new or increased vision changes, such as blurred or spotted vision. You have new or increased swelling in your face or hands. You have vaginal spotting or bleeding. Your water broke or you feel warm water gushing or trickling from your vagina. Call your doctor or obstetrician if:   You have more than 5 contractions in 1 hour. You notice any changes in your baby's movements. You have abdominal cramps, pressure, or tightening. You have a change in vaginal discharge. You have chills or a fever. You have vaginal itching, burning, or pain. You have yellow, green, white, or foul-smelling vaginal discharge. You have pain or burning when you urinate, less urine than usual, or pink or bloody urine. You have questions or concerns about your condition or care. How to care for yourself at this stage of your pregnancy:       Eat a variety of healthy foods. Healthy foods include fruits, vegetables, whole-grain breads, low-fat dairy foods, beans, lean meats, and fish. Drink liquids as directed. Ask how much liquid to drink each day and which liquids are best for you. Limit caffeine to less than 200 milligrams each day. Limit your intake of fish to 2 servings each week. Choose fish low in mercury such as canned light tuna, shrimp, salmon, cod, or tilapia. Do not  eat fish high in mercury such as swordfish, tilefish, lyndsay mackerel, and shark. Manage heartburn  by eating 4 or 5 small meals each day instead of large meals. Avoid spicy food. Manage swelling  by lying down and putting your feet up. Take prenatal vitamins as directed. Your need for certain vitamins and minerals, such as folic acid, increases during pregnancy. Prenatal vitamins provide some of the extra vitamins and minerals you need. Prenatal vitamins may also help to decrease the risk of certain birth defects. Talk to your healthcare provider about exercise. Moderate exercise can help you stay fit. Your healthcare provider will help you plan an exercise program that is safe for you during pregnancy. Do not smoke. Smoking increases your risk of a miscarriage and other health problems during your pregnancy. Smoking can cause your baby to be born too early or weigh less at birth. Ask your healthcare provider for information if you need help quitting. Do not drink alcohol. Alcohol passes from your body to your baby through the placenta. It can affect your baby's brain development and cause fetal alcohol syndrome (FAS). FAS is a group of conditions that causes mental, behavior, and growth problems. Talk to your healthcare provider before you take any medicines. Many medicines may harm your baby if you take them when you are pregnant. Do not take any medicines, vitamins, herbs, or supplements without first talking to your healthcare provider. Never use illegal or street drugs (such as marijuana or cocaine) while you are pregnant. Safety tips during pregnancy:   Avoid hot tubs and saunas. Do not use a hot tub or sauna while you are pregnant, especially during your first trimester. Hot tubs and saunas may raise your baby's temperature and increase the risk of birth defects. Avoid toxoplasmosis. This is an infection caused by eating raw meat or being around infected cat feces. It can cause birth defects, miscarriages, and other problems. Wash your hands after you touch raw meat. Make sure any meat is well-cooked before you eat it. Avoid raw eggs and unpasteurized milk. Use gloves or ask someone else to clean your cat's litter box while you are pregnant. Changes that are happening with your baby:  By 30 weeks, your baby may weigh more than 3 pounds. Your baby may be about 11 inches long from the top of the head to the rump (baby's bottom). Your baby's eyes open and close now. Your baby's kicks and movements are more forceful at this time. What you need to know about prenatal care: Your healthcare provider will check your blood pressure and weight. You may also need the following:  Blood tests  may be done to check for anemia or blood type. A urine test  may also be done to check for sugar and protein. These can be signs of gestational diabetes or infection. Protein in your urine may also be a sign of preeclampsia. Preeclampsia is a condition that can develop during week 20 or later of your pregnancy. It causes high blood pressure, and it can cause problems with your kidneys and other organs. A Tdap vaccine and flu vaccine  may be recommended by your healthcare provider. A gestational diabetes screen  may be done. Your healthcare provider may order either a 1-step or 2-step oral glucose tolerance test (OGTT). 1-step OGTT:  Your blood sugar level will be tested after you have not eaten for 8 hours (fasting). You will then be given a glucose drink. Your level will be tested again 1 hour and 2 hours after you finish the drink. 2-step OGTT:  You do not have to fast for the first part of the test. You will have the glucose drink at any time of day. Your blood sugar level will be checked 1 hour later. If your blood sugar is higher than a certain level, another test will be ordered. You will fast and your blood sugar level will be tested. You will have the glucose drink. Your blood will be tested again 1 hour, 2 hours, and 3 hours after you finish the glucose drink. Fundal height  is a measurement of your uterus to check your baby's growth. This number is usually the same as the number of weeks that you have been pregnant.  Your healthcare provider may also check your baby's position. Your baby's heart rate  will be checked. Follow up with your doctor or obstetrician as directed:  Write down your questions so you remember to ask them during your visits. © Copyright [employer] 2023 Information is for End User's use only and may not be sold, redistributed or otherwise used for commercial purposes. The above information is an  only. It is not intended as medical advice for individual conditions or treatments. Talk to your doctor, nurse or pharmacist before following any medical regimen to see if it is safe and effective for you.

## 2023-10-13 NOTE — PROGRESS NOTES
OB/GYN  PN Visit  Fabienne Caban  [id number]  10/18/2023  8:36 AM  Dr. Aleisha Hallman MD    S: 20 y.o. [id number] 28w4d here for PN visit. Chief Complaint   Patient presents with    Routine Prenatal Visit     No concerns         OB complaints:  Contractions: no  Leakage: no  Bleeding: no  Fetal movement: yes      O:  /66   Pulse (!) 115   Ht 5' 3" (1.6 m)   Wt 79.4 kg (175 lb)   LMP 2023   SpO2 99%   BMI 31.00 kg/m²       Review of Systems   Constitutional: Negative. HENT: Negative. Eyes: Negative. Respiratory: Negative. Cardiovascular: Negative. Gastrointestinal: Negative. Endocrine: Negative. Genitourinary:         As noted in HPI   Musculoskeletal: Negative. Skin: Negative. Allergic/Immunologic: Negative. Neurological: Negative. Hematological: Negative. Psychiatric/Behavioral: Negative. Physical Exam  Constitutional:       General: She is not in acute distress. Appearance: She is well-developed. Abdominal:      Palpations: Abdomen is soft. Tenderness: There is no abdominal tenderness. There is no guarding. Neurological:      Mental Status: She is alert and oriented to person, place, and time. Skin:     General: Skin is warm and dry.    Psychiatric:         Behavior: Behavior normal.             Pregravid Weight/BMI: 70.8 kg (156 lb) (BMI 27.64)  Current Weight: 79.4 kg (175 lb)   Total Weight Gain: 8.618 kg (19 lb)   Pre- Vitals      Flowsheet Row Most Recent Value   Prenatal Assessment    Fetal Heart Rate 153   Fundal Height (cm) 28 cm   Prenatal Vitals    Blood Pressure 100/66   Weight - Scale 79.4 kg (175 lb)   Urine Albumin/Glucose    Dilation/Effacement/Station    Vaginal Drainage    Edema              Problem List          Other    Supervision of other normal pregnancy, antepartum    Overview       CF/ SMA testing: declines  Covid vaccine: not completed   Declines aneuploidy, NTD screening          History of gestational hypertension    Overview     Developed in labor and did not require medical management and resolved after birth. Baseline urine TP/ creatinine at 12 wks:  0.12  Low-dose aspirin until 36 weeks         IUGR (intrauterine growth restriction) in prior pregnancy, pregnant, third trimester    Overview     Weighed 6 pounds 6 ounces at birth. Delivered at 39 weeks. Had normal umb Dopplers in pregnancy. - growth US          28 weeks gestation of pregnancy    Depression affecting pregnancy    Marginal insertion of umbilical cord affecting management of mother in third trimester    Overview     Inferior marginal placental cord insertion site without evidence of low-lying placenta or vasa previa. We reviewed that she may be at increased risk for a funic presentation and  I recommend follow-up at around 32 weeks to assess fetal growth as well as umbilical cord location.           Other Visit Diagnoses       Third trimester pregnancy    -  Primary           Declines flu and TDAP  C/o feet swelling  No preeclampsia but did have gestational HTN postpartum  Advised to observe swelling for now, monitor BPs, call if 140/90  Advised to call if with headache, blurry vision  Follow-up in 2 weeks  Yellow folder given      Future Appointments   Date Time Provider [address] [place] [address]   2023  2:00 PM  [facility]               Tanner De La Rosa MD  10/18/2023  8:36 AM

## 2023-10-18 ENCOUNTER — ROUTINE PRENATAL (OUTPATIENT)
Dept: OBGYN CLINIC | Facility: CLINIC | Age: 23
End: 2023-10-18

## 2023-10-18 VITALS
WEIGHT: 175 LBS | SYSTOLIC BLOOD PRESSURE: 100 MMHG | HEIGHT: 63 IN | DIASTOLIC BLOOD PRESSURE: 66 MMHG | BODY MASS INDEX: 31.01 KG/M2 | OXYGEN SATURATION: 99 % | HEART RATE: 115 BPM

## 2023-10-18 DIAGNOSIS — Z3A.28 28 WEEKS GESTATION OF PREGNANCY: ICD-10-CM

## 2023-10-18 DIAGNOSIS — O43.193 MARGINAL INSERTION OF UMBILICAL CORD AFFECTING MANAGEMENT OF MOTHER IN THIRD TRIMESTER: ICD-10-CM

## 2023-10-18 DIAGNOSIS — Z87.59 HISTORY OF GESTATIONAL HYPERTENSION: ICD-10-CM

## 2023-10-18 DIAGNOSIS — Z34.93 THIRD TRIMESTER PREGNANCY: Primary | ICD-10-CM

## 2023-10-18 DIAGNOSIS — O09.293 IUGR (INTRAUTERINE GROWTH RESTRICTION) IN PRIOR PREGNANCY, PREGNANT, THIRD TRIMESTER: ICD-10-CM

## 2023-10-18 DIAGNOSIS — Z34.80 SUPERVISION OF OTHER NORMAL PREGNANCY, ANTEPARTUM: ICD-10-CM

## 2023-10-18 PROCEDURE — PNV: Performed by: OBSTETRICS & GYNECOLOGY

## 2023-11-01 ENCOUNTER — ROUTINE PRENATAL (OUTPATIENT)
Dept: OBGYN CLINIC | Facility: CLINIC | Age: 23
End: 2023-11-01

## 2023-11-01 VITALS
BODY MASS INDEX: 33.66 KG/M2 | DIASTOLIC BLOOD PRESSURE: 80 MMHG | HEART RATE: 104 BPM | SYSTOLIC BLOOD PRESSURE: 122 MMHG | WEIGHT: 190 LBS | HEIGHT: 63 IN

## 2023-11-01 DIAGNOSIS — O43.193 MARGINAL INSERTION OF UMBILICAL CORD AFFECTING MANAGEMENT OF MOTHER IN THIRD TRIMESTER: ICD-10-CM

## 2023-11-01 DIAGNOSIS — O09.293 IUGR (INTRAUTERINE GROWTH RESTRICTION) IN PRIOR PREGNANCY, PREGNANT, THIRD TRIMESTER: Primary | ICD-10-CM

## 2023-11-01 DIAGNOSIS — Z87.59 HISTORY OF GESTATIONAL HYPERTENSION: ICD-10-CM

## 2023-11-01 DIAGNOSIS — Z34.80 SUPERVISION OF OTHER NORMAL PREGNANCY, ANTEPARTUM: ICD-10-CM

## 2023-11-01 DIAGNOSIS — Z3A.30 30 WEEKS GESTATION OF PREGNANCY: ICD-10-CM

## 2023-11-01 PROCEDURE — PNV: Performed by: OBSTETRICS & GYNECOLOGY

## 2023-11-01 NOTE — ASSESSMENT & PLAN NOTE
Consents - she will bring next visit  Reviewed RSV vaccination timing/indication - she will do some research and let us know if interested   OB precautions reviewed

## 2023-11-01 NOTE — PROGRESS NOTES
S: 20 y.o. [id number] 30w5d here for routine prenatal visit. Chief Complaint   Patient presents with    Routine Prenatal Visit         OB complaints:  Contractions: no  Leakage: no  Bleeding: no  Fetal movement: yes      O:  /80 (BP Location: Right arm, Patient Position: Sitting, Cuff Size: Adult)   Pulse 104   Ht 5' 3" (1.6 m)   Wt 86.2 kg (190 lb)   LMP 2023   BMI 33.66 kg/m²       Review of Systems   Constitutional:  Negative for chills and fever. Eyes:  Negative for visual disturbance. Respiratory:  Negative for chest tightness and shortness of breath. Cardiovascular:  Negative for chest pain. Gastrointestinal:  Negative for abdominal pain, diarrhea, nausea and vomiting. Genitourinary:  Negative for pelvic pain and vaginal bleeding. As noted in HPI   Skin:  Negative for rash. Neurological:  Negative for headaches. All other systems reviewed and are negative. Physical Exam  Constitutional:       General: She is not in acute distress. Appearance: Normal appearance. HENT:      Head: Normocephalic and atraumatic. Cardiovascular:      Rate and Rhythm: Normal rate. Pulmonary:      Effort: Pulmonary effort is normal. No respiratory distress. Abdominal:      General: There is no distension. Palpations: Abdomen is soft. Tenderness: There is no abdominal tenderness. There is no guarding or rebound. Comments: gravid   Neurological:      General: No focal deficit present. Mental Status: She is alert. Psychiatric:         Mood and Affect: Mood normal.         Behavior: Behavior normal.   Vitals and nursing note reviewed.            Fundal Height (cm): 30 cm  Fetal Heart Rate: 155    Pregravid Weight/BMI: 70.8 kg (156 lb) (BMI 27.64)  Current Weight: 86.2 kg (190 lb)   Total Weight Gain: 15.4 kg (34 lb)     Pre- Vitals      Flowsheet Row Most Recent Value   Prenatal Assessment    Fetal Heart Rate 155   Fundal Height (cm) 30 cm Movement Present   Prenatal Vitals    Blood Pressure 122/80   Weight - Scale 86.2 kg (190 lb)   Urine Albumin/Glucose    Dilation/Effacement/Station    Vaginal Drainage    Edema                     Problem List       Supervision of other normal pregnancy, antepartum    Overview       CF/ SMA testing: declines  Covid vaccine: not completed   Declines aneuploidy, NTD screening   Declines flu, tdap vaccines          Current Assessment & Plan     Consents - she will bring next visit  Reviewed RSV vaccination timing/indication - she will do some research and let us know if interested   OB precautions reviewed         History of gestational hypertension    Overview     Developed in labor and did not require medical management and resolved after birth. Baseline urine TP/ creatinine at 12 wks:  0.12  Low-dose aspirin until 36 weeks         IUGR (intrauterine growth restriction) in prior pregnancy, pregnant, third trimester    Overview     Weighed 6 pounds 6 ounces at birth. Delivered at 39 weeks. Had normal umb Dopplers in pregnancy. - growth US          Current Assessment & Plan     Growth US scheduled          30 weeks gestation of pregnancy    Depression affecting pregnancy    Marginal insertion of umbilical cord affecting management of mother in third trimester    Overview     Inferior marginal placental cord insertion site without evidence of low-lying placenta or vasa previa. We reviewed that she may be at increased risk for a funic presentation and  I recommend follow-up at around 32 weeks to assess fetal growth as well as umbilical cord location.                               Nolvia Graves MD  11/1/2023  8:47 AM

## 2023-11-14 PROBLEM — Z3A.32 32 WEEKS GESTATION OF PREGNANCY: Status: ACTIVE | Noted: 2023-06-01

## 2023-11-14 NOTE — PROGRESS NOTES
OB/GYN  PN Visit  Emil Montalvo  [id number]  11/15/2023  3:58 PM  Dr. Karyle Fort, MD    S: 20 y.o. Angie Salgado here for PN visit. Chief Complaint   Patient presents with    Routine Prenatal Visit     No concerns         OB complaints:  Contractions: no  Leakage: no  Bleeding: no  Fetal movement: yes      O:  /80   Pulse 98   Temp 97.9 °F (36.6 °C) (Tympanic)   Ht 5' 3" (1.6 m)   Wt 88.5 kg (195 lb)   LMP 2023   SpO2 99%   BMI 34.54 kg/m²       Review of Systems   Constitutional: Negative. HENT: Negative. Eyes: Negative. Respiratory: Negative. Cardiovascular: Negative. Gastrointestinal: Negative. Endocrine: Negative. Genitourinary:         As noted in HPI   Musculoskeletal: Negative. Skin: Negative. Allergic/Immunologic: Negative. Neurological: Negative. Hematological: Negative. Psychiatric/Behavioral: Negative. Physical Exam  Constitutional:       General: She is not in acute distress. Appearance: Normal appearance. She is well-developed. Abdominal:      Palpations: Abdomen is soft. Tenderness: There is no abdominal tenderness. There is no guarding. Neurological:      Mental Status: She is alert and oriented to person, place, and time. Skin:     General: Skin is warm and dry.    Psychiatric:         Behavior: Behavior normal.             Pregravid Weight/BMI: 70.8 kg (156 lb) (BMI 27.64)  Current Weight: 88.5 kg (195 lb)   Total Weight Gain: 17.7 kg (39 lb)   Pre-Neisha Vitals      Flowsheet Row Most Recent Value   Prenatal Assessment    Fetal Heart Rate 145   Fundal Height (cm) 32 cm   Movement Present   Prenatal Vitals    Blood Pressure 130/80   Weight - Scale 88.5 kg (195 lb)   Urine Albumin/Glucose    Dilation/Effacement/Station    Vaginal Drainage    Edema              Problem List          Other    Supervision of other normal pregnancy, antepartum    Overview       CF/ SMA testing: declines  Covid vaccine: not completed Declines aneuploidy, NTD screening   Declines flu, tdap vaccines          History of gestational hypertension    Overview     Developed in labor and did not require medical management and resolved after birth. Baseline urine TP/ creatinine at 12 wks:  0.12  Low-dose aspirin until 36 weeks         IUGR (intrauterine growth restriction) in prior pregnancy, pregnant, third trimester    Overview     Weighed 6 pounds 6 ounces at birth. Delivered at 39 weeks. Had normal umb Dopplers in pregnancy. - growth US          32 weeks gestation of pregnancy    Depression affecting pregnancy    Marginal insertion of umbilical cord affecting management of mother in third trimester    Overview     Inferior marginal placental cord insertion site without evidence of low-lying placenta or vasa previa. We reviewed that she may be at increased risk for a funic presentation and  I recommend follow-up at around 32 weeks to assess fetal growth as well as umbilical cord location.          Elevated BP without diagnosis of hypertension     Other Visit Diagnoses       Third trimester pregnancy    -  Primary           Growth scan on Fri  Follow-up in  2 weeks  Birth plan, consent received  BP recheck 130/80 - no headache or blurry vision or epigastric pain  Preeclampsia labs, recheck BP at [facility]     Daily BPs at home, report BP over 140/90      Future Appointments   Date Time Provider [address] [place] [address]   2023  2:00 PM  [facility]   2023  3:15 PM Alberto Cardoza MD [facility]               Alberto Cardoza MD  11/15/2023  3:58 PM

## 2023-11-14 NOTE — PATIENT INSTRUCTIONS
Pregnancy at 31 to 34 [address] [place]:   You may continue to have symptoms such as shortness of breath, heartburn, contractions, or swelling of your ankles and feet. You may be gaining about 1 pound a week now. DISCHARGE INSTRUCTIONS:   Return to the emergency department if:   You develop a severe headache that does not go away. You have new or increased vision changes, such as blurred or spotted vision. You have new or increased swelling in your face or hands. You have vaginal spotting or bleeding. Your water broke or you feel warm water gushing or trickling from your vagina. Call your obstetrician if:   You have more than 5 contractions in 1 hour. You notice any changes in your baby's movements. You have abdominal cramps, pressure, or tightening. You have a change in vaginal discharge. You have chills or a fever. You have vaginal itching, burning, or pain. You have yellow, green, white, or foul-smelling vaginal discharge. You have pain or burning when you urinate, less urine than usual, or pink or bloody urine. You have questions or concerns about your condition or care. How to care for yourself at this stage of your pregnancy:       Eat a variety of healthy foods. Healthy foods include fruits, vegetables, whole-grain breads, low-fat dairy foods, beans, lean meats, and fish. Drink liquids as directed. Ask how much liquid to drink each day and which liquids are best for you. Limit caffeine to less than 200 milligrams each day. Limit your intake of fish to 2 servings each week. Choose fish low in mercury such as canned light tuna, shrimp, salmon, cod, or tilapia. Do not  eat fish high in mercury such as swordfish, tilefish, lyndsay mackerel, and shark. Manage heartburn  by eating 4 or 5 small meals each day instead of large meals. Avoid spicy food. Manage swelling  by lying down and putting your feet up. Take prenatal vitamins as directed. Your need for certain vitamins and minerals, such as folic acid, increases during pregnancy. Prenatal vitamins provide some of the extra vitamins and minerals you need. Prenatal vitamins may also help to decrease the risk of certain birth defects. Talk to your healthcare provider about exercise. Moderate exercise can help you stay fit. Your healthcare provider will help you plan an exercise program that is safe for you during pregnancy. Do not smoke. Smoking increases your risk of a miscarriage and other health problems during your pregnancy. Smoking can cause your baby to be born too early or weigh less at birth. Ask your healthcare provider for information if you need help quitting. Do not drink alcohol. Alcohol passes from your body to your baby through the placenta. It can affect your baby's brain development and cause fetal alcohol syndrome (FAS). FAS is a group of conditions that causes mental, behavior, and growth problems. Talk to your healthcare provider before you take any medicines. Many medicines may harm your baby if you take them when you are pregnant. Do not take any medicines, vitamins, herbs, or supplements without first talking to your healthcare provider. Never use illegal or street drugs (such as marijuana or cocaine) while you are pregnant. Safety tips during pregnancy:   Avoid hot tubs and saunas. Do not use a hot tub or sauna while you are pregnant, especially during your first trimester. Hot tubs and saunas may raise your baby's temperature and increase the risk of birth defects. Avoid toxoplasmosis. This is an infection caused by eating raw meat or being around infected cat feces. It can cause birth defects, miscarriages, and other problems. Wash your hands after you touch raw meat. Make sure any meat is well-cooked before you eat it. Avoid raw eggs and unpasteurized milk. Use gloves or ask someone else to clean your cat's litter box while you are pregnant. Changes happening with your baby:  By 34 weeks, your baby may weigh more than 5 pounds. Your baby will be about 12 ½ inches long from the top of the head to the rump (baby's bottom). Your baby is gaining about ½ pound a week. Your baby's eyes open and close now. Your baby's kicks and movements are more forceful at this time. What you need to know about prenatal care: Your healthcare provider will check your blood pressure and weight. You may also need the following:  A urine test  may also be done to check for sugar and protein. These can be signs of gestational diabetes or infection. Protein in your urine may also be a sign of preeclampsia. Preeclampsia is a condition that can develop during week 20 or later of your pregnancy. It causes high blood pressure, and it can cause problems with your kidneys and other organs. A gestational diabetes screen  may be done. Your healthcare provider may order either a 1-step or 2-step oral glucose tolerance test (OGTT). 1-step OGTT:  Your blood sugar level will be tested after you have not eaten for 8 hours (fasting). You will then be given a glucose drink. Your level will be tested again 1 hour and 2 hours after you finish the drink. 2-step OGTT:  You do not have to fast for the first part of the test. You will have the glucose drink at any time of day. Your blood sugar level will be checked 1 hour later. If your blood sugar is higher than a certain level, another test will be ordered. You will fast and your blood sugar level will be tested. You will have the glucose drink. Your blood will be tested again 1 hour, 2 hours, and 3 hours after you finish the glucose drink. A Tdap vaccine  may be recommended by your healthcare provider. Fundal height  is a measurement of your uterus to check your baby's growth. This number is usually the same as the number of weeks that you have been pregnant.  Your healthcare provider may also check your baby's position. Your baby's heart rate  will be checked. Follow up with your obstetrician as directed:  Write down your questions so you remember to ask them during your visits. © Copyright [employer] 2023 Information is for End User's use only and may not be sold, redistributed or otherwise used for commercial purposes. The above information is an  only. It is not intended as medical advice for individual conditions or treatments. Talk to your doctor, nurse or pharmacist before following any medical regimen to see if it is safe and effective for you.

## 2023-11-15 ENCOUNTER — ROUTINE PRENATAL (OUTPATIENT)
Dept: OBGYN CLINIC | Facility: CLINIC | Age: 23
End: 2023-11-15

## 2023-11-15 VITALS
SYSTOLIC BLOOD PRESSURE: 130 MMHG | WEIGHT: 195 LBS | OXYGEN SATURATION: 99 % | TEMPERATURE: 97.9 F | HEART RATE: 98 BPM | HEIGHT: 63 IN | DIASTOLIC BLOOD PRESSURE: 80 MMHG | BODY MASS INDEX: 34.55 KG/M2

## 2023-11-15 DIAGNOSIS — F32.A DEPRESSION AFFECTING PREGNANCY: ICD-10-CM

## 2023-11-15 DIAGNOSIS — O09.293 IUGR (INTRAUTERINE GROWTH RESTRICTION) IN PRIOR PREGNANCY, PREGNANT, THIRD TRIMESTER: ICD-10-CM

## 2023-11-15 DIAGNOSIS — R03.0 ELEVATED BP WITHOUT DIAGNOSIS OF HYPERTENSION: ICD-10-CM

## 2023-11-15 DIAGNOSIS — Z34.80 SUPERVISION OF OTHER NORMAL PREGNANCY, ANTEPARTUM: ICD-10-CM

## 2023-11-15 DIAGNOSIS — Z3A.32 32 WEEKS GESTATION OF PREGNANCY: ICD-10-CM

## 2023-11-15 DIAGNOSIS — O43.193 MARGINAL INSERTION OF UMBILICAL CORD AFFECTING MANAGEMENT OF MOTHER IN THIRD TRIMESTER: ICD-10-CM

## 2023-11-15 DIAGNOSIS — O99.340 DEPRESSION AFFECTING PREGNANCY: ICD-10-CM

## 2023-11-15 DIAGNOSIS — Z34.93 THIRD TRIMESTER PREGNANCY: Primary | ICD-10-CM

## 2023-11-15 DIAGNOSIS — Z87.59 HISTORY OF GESTATIONAL HYPERTENSION: ICD-10-CM

## 2023-11-15 PROCEDURE — PNV: Performed by: OBSTETRICS & GYNECOLOGY

## 2023-11-16 ENCOUNTER — APPOINTMENT (OUTPATIENT)
Dept: LAB | Facility: CLINIC | Age: 23
End: 2023-11-16
Payer: COMMERCIAL

## 2023-11-16 DIAGNOSIS — Z3A.32 32 WEEKS GESTATION OF PREGNANCY: ICD-10-CM

## 2023-11-16 DIAGNOSIS — R03.0 ELEVATED BP WITHOUT DIAGNOSIS OF HYPERTENSION: ICD-10-CM

## 2023-11-16 DIAGNOSIS — Z34.93 THIRD TRIMESTER PREGNANCY: ICD-10-CM

## 2023-11-16 LAB
ALBUMIN SERPL BCP-MCNC: 3.4 G/DL (ref 3.5–5)
ALP SERPL-CCNC: 128 U/L (ref 34–104)
ALT SERPL W P-5'-P-CCNC: 8 U/L (ref 7–52)
ANION GAP SERPL CALCULATED.3IONS-SCNC: 9 MMOL/L
AST SERPL W P-5'-P-CCNC: 15 U/L (ref 13–39)
BILIRUB SERPL-MCNC: 0.22 MG/DL (ref 0.2–1)
BUN SERPL-MCNC: 10 MG/DL (ref 5–25)
CALCIUM ALBUM COR SERPL-MCNC: 9.5 MG/DL (ref 8.3–10.1)
CALCIUM SERPL-MCNC: 9 MG/DL (ref 8.4–10.2)
CHLORIDE SERPL-SCNC: 103 MMOL/L (ref 96–108)
CO2 SERPL-SCNC: 24 MMOL/L (ref 21–32)
CREAT SERPL-MCNC: 0.61 MG/DL (ref 0.6–1.3)
CREAT UR-MCNC: 209.2 MG/DL
ERYTHROCYTE [DISTWIDTH] IN BLOOD BY AUTOMATED COUNT: 12.8 % (ref 11.6–15.1)
GFR SERPL CREATININE-BSD FRML MDRD: 126 ML/MIN/1.73SQ M
GLUCOSE P FAST SERPL-MCNC: 81 MG/DL (ref 65–99)
HCT VFR BLD AUTO: 35.2 % (ref 34.8–46.1)
HGB BLD-MCNC: 11.1 G/DL (ref 11.5–15.4)
MCH RBC QN AUTO: 26 PG (ref 26.8–34.3)
MCHC RBC AUTO-ENTMCNC: 31.5 G/DL (ref 31.4–37.4)
MCV RBC AUTO: 82 FL (ref 82–98)
PLATELET # BLD AUTO: 222 THOUSANDS/UL (ref 149–390)
PMV BLD AUTO: 11.8 FL (ref 8.9–12.7)
POTASSIUM SERPL-SCNC: 3.8 MMOL/L (ref 3.5–5.3)
PROT SERPL-MCNC: 6.1 G/DL (ref 6.4–8.4)
PROT UR-MCNC: 35 MG/DL
PROT/CREAT UR: 0.17 MG/G{CREAT} (ref 0–0.1)
RBC # BLD AUTO: 4.27 MILLION/UL (ref 3.81–5.12)
SODIUM SERPL-SCNC: 136 MMOL/L (ref 135–147)
WBC # BLD AUTO: 7.35 THOUSAND/UL (ref 4.31–10.16)

## 2023-11-16 PROCEDURE — 84156 ASSAY OF PROTEIN URINE: CPT | Performed by: OBSTETRICS & GYNECOLOGY

## 2023-11-16 PROCEDURE — 80053 COMPREHEN METABOLIC PANEL: CPT

## 2023-11-16 PROCEDURE — 85027 COMPLETE CBC AUTOMATED: CPT

## 2023-11-16 PROCEDURE — 82570 ASSAY OF URINE CREATININE: CPT | Performed by: OBSTETRICS & GYNECOLOGY

## 2023-11-16 PROCEDURE — 36415 COLL VENOUS BLD VENIPUNCTURE: CPT

## 2023-11-17 ENCOUNTER — ULTRASOUND (OUTPATIENT)
Dept: PERINATAL CARE | Facility: OTHER | Age: 23
End: 2023-11-17
Payer: COMMERCIAL

## 2023-11-17 ENCOUNTER — TELEPHONE (OUTPATIENT)
Dept: PERINATAL CARE | Facility: OTHER | Age: 23
End: 2023-11-17

## 2023-11-17 VITALS
WEIGHT: 195 LBS | DIASTOLIC BLOOD PRESSURE: 70 MMHG | BODY MASS INDEX: 34.55 KG/M2 | SYSTOLIC BLOOD PRESSURE: 122 MMHG | HEART RATE: 80 BPM | HEIGHT: 63 IN

## 2023-11-17 DIAGNOSIS — O09.293 IUGR (INTRAUTERINE GROWTH RESTRICTION) IN PRIOR PREGNANCY, PREGNANT, THIRD TRIMESTER: ICD-10-CM

## 2023-11-17 DIAGNOSIS — Z87.59 HISTORY OF GESTATIONAL HYPERTENSION: ICD-10-CM

## 2023-11-17 DIAGNOSIS — Z36.89 ENCOUNTER FOR ULTRASOUND TO ASSESS FETAL GROWTH: ICD-10-CM

## 2023-11-17 DIAGNOSIS — Z3A.33 33 WEEKS GESTATION OF PREGNANCY: ICD-10-CM

## 2023-11-17 DIAGNOSIS — O43.193 MARGINAL INSERTION OF UMBILICAL CORD AFFECTING MANAGEMENT OF MOTHER IN THIRD TRIMESTER: Primary | ICD-10-CM

## 2023-11-17 PROCEDURE — 76816 OB US FOLLOW-UP PER FETUS: CPT | Performed by: OBSTETRICS & GYNECOLOGY

## 2023-11-17 PROCEDURE — 99213 OFFICE O/P EST LOW 20 MIN: CPT | Performed by: OBSTETRICS & GYNECOLOGY

## 2023-11-17 NOTE — LETTER
November 17, 2023     Viv Multani, [address]   [address]    Patient: Tad Nelson   YOB: 2000   Date of Visit: 11/17/2023       Dear Dr. Elton Thibodeaux: Thank you for referring Tad Nelson to me for evaluation. Below are my notes for this consultation. If you have questions, please do not hesitate to call me. I look forward to following your patient along with you. Sincerely,        Lakesha Patrick MD        CC: No Recipients    Lakesha Patrick MD  11/17/2023  2:34 PM  Sign when Signing Visit  [facility]: Tad Nelson was seen today at 33w0d for fetal growth assessment ultrasound. See ultrasound report under "OB Procedures" tab.   Please don't hesitate to contact our office with any concerns or questions.  -Lakesha Patrick MD

## 2023-11-17 NOTE — PROGRESS NOTES
[facility]: Carolyn Khan was seen today at 33w0d for fetal growth assessment ultrasound. See ultrasound report under "OB Procedures" tab.   Please don't hesitate to contact our office with any concerns or questions.  -Yoko Wyatt MD

## 2023-11-21 ENCOUNTER — ROUTINE PRENATAL (OUTPATIENT)
Dept: PERINATAL CARE | Facility: OTHER | Age: 23
End: 2023-11-21
Payer: COMMERCIAL

## 2023-11-21 VITALS
WEIGHT: 197.2 LBS | HEART RATE: 97 BPM | HEIGHT: 63 IN | SYSTOLIC BLOOD PRESSURE: 138 MMHG | BODY MASS INDEX: 34.94 KG/M2 | DIASTOLIC BLOOD PRESSURE: 72 MMHG

## 2023-11-21 DIAGNOSIS — O43.193 MARGINAL INSERTION OF UMBILICAL CORD AFFECTING MANAGEMENT OF MOTHER IN THIRD TRIMESTER: Primary | ICD-10-CM

## 2023-11-21 DIAGNOSIS — O13.3 GESTATIONAL HYPERTENSION, THIRD TRIMESTER: ICD-10-CM

## 2023-11-21 DIAGNOSIS — Z3A.33 33 WEEKS GESTATION OF PREGNANCY: ICD-10-CM

## 2023-11-21 DIAGNOSIS — Z36.86 ENCOUNTER FOR ANTENATAL SCREENING FOR CERVICAL LENGTH: ICD-10-CM

## 2023-11-21 PROCEDURE — 99214 OFFICE O/P EST MOD 30 MIN: CPT | Performed by: STUDENT IN AN ORGANIZED HEALTH CARE EDUCATION/TRAINING PROGRAM

## 2023-11-21 PROCEDURE — 76815 OB US LIMITED FETUS(S): CPT | Performed by: STUDENT IN AN ORGANIZED HEALTH CARE EDUCATION/TRAINING PROGRAM

## 2023-11-21 PROCEDURE — 76817 TRANSVAGINAL US OBSTETRIC: CPT | Performed by: STUDENT IN AN ORGANIZED HEALTH CARE EDUCATION/TRAINING PROGRAM

## 2023-11-21 NOTE — LETTER
November 22, 2023     Karyle Fort, [address]   [address]    Patient: Emil Montalvo   YOB: 2000   Date of Visit: 11/21/2023       Dear Dr. Marquis Alvarado: Thank you for referring Emil Montalvo to me for evaluation. Below are my notes for this consultation. Scott Massey does not have a funic presentation or a vasa previa. She does however now meet criteria for gestational hypertension. Twice weekly NST with weekly MARIAM, weekly labs with urine protein creatinine ratio, and delivery at 37 weeks recommended. If you have questions, please do not hesitate to call me. I look forward to following your patient along with you. Sincerely,        Sylvie Sotelo MD        CC: No Recipients    Sylvie Sotelo MD  11/22/2023  2:00 PM  Sign when Signing Visit  [facility]: Ms. Patrice Zuluaga was seen today for  transvaginal ultrasound . See ultrasound report under "OB Procedures" tab. MDM:   I. Diagnoses/Problems addressed:  gHTN  II. Data: I reviewed 3 lab tests ordered by another provider. III. Risk of morbidity: Low    Please don't hesitate to contact our office with any concerns or questions.   -Sylvie Sotelo MD

## 2023-11-21 NOTE — PROGRESS NOTES
Ultrasound Probe Disinfection    A transvaginal ultrasound was performed. Prior to use, disinfection was performed with [employer] ([employer] Probe serial number F2: [id number] was used.       Alberto Estrada  11/21/23  3:31 PM

## 2023-11-22 PROBLEM — O13.3 GESTATIONAL HYPERTENSION, THIRD TRIMESTER: Status: ACTIVE | Noted: 2023-11-15

## 2023-11-22 NOTE — PROGRESS NOTES
[place]: Ms. Victorino Owen was seen today for  transvaginal ultrasound . See ultrasound report under "OB Procedures" tab. MDM:   I. Diagnoses/Problems addressed:  gHTN  II. Data: I reviewed 3 lab tests ordered by another provider. III. Risk of morbidity: Low    Please don't hesitate to contact our office with any concerns or questions.   -Antionette Peck MD

## 2023-11-24 ENCOUNTER — TELEPHONE (OUTPATIENT)
Dept: PERINATAL CARE | Facility: OTHER | Age: 23
End: 2023-11-24

## 2023-11-24 NOTE — TELEPHONE ENCOUNTER
Lvm for patient to call the office back to schedule f/u appts. Needs twice weekly NST/MARIAM. Patient would like to do 1 at her OB office and the other with us.

## 2023-11-27 PROBLEM — Z3A.34 34 WEEKS GESTATION OF PREGNANCY: Status: ACTIVE | Noted: 2023-06-01

## 2023-11-27 NOTE — PROGRESS NOTES
OB/GYN  PN Visit  Cecilia Duvall  [id number]  2023  4:12 PM  Dr. Rodriguez Chacon MD    S: 20 y.o. [id number] 34w4d here for PN visit. Chief Complaint   Patient presents with    Routine Prenatal Visit     Wants to discuss last two mfm appts     She has no headache or blurry vision. She reports BPs at home 130s/80s      OB complaints:  Contractions: no  Leakage: no  Bleeding: no  Fetal movement: yes      O:  /92 (BP Location: Right arm, Patient Position: Sitting, Cuff Size: Adult)   Pulse 99   Ht 5' 3" (1.6 m)   Wt 91.1 kg (200 lb 12.8 oz)   LMP 2023   BMI 35.57 kg/m²       Review of Systems   Constitutional: Negative. HENT: Negative. Eyes: Negative. Respiratory: Negative. Cardiovascular: Negative. Gastrointestinal: Negative. Endocrine: Negative. Genitourinary:         As noted in HPI   Musculoskeletal: Negative. Skin: Negative. Allergic/Immunologic: Negative. Neurological: Negative. Hematological: Negative. Psychiatric/Behavioral: Negative. Physical Exam  Constitutional:       General: She is not in acute distress. Appearance: Normal appearance. She is well-developed. Abdominal:      Palpations: Abdomen is soft. Tenderness: There is no abdominal tenderness. There is no guarding. Neurological:      Mental Status: She is alert and oriented to person, place, and time. Skin:     General: Skin is warm and dry.    Psychiatric:         Behavior: Behavior normal.             Pregravid Weight/BMI: 70.8 kg (156 lb) (BMI 27.64)  Current Weight: 91.1 kg (200 lb 12.8 oz)   Total Weight Gain: 20.3 kg (44 lb 12.8 oz)   Pre- Vitals      Flowsheet Row Most Recent Value   Prenatal Assessment    Fetal Heart Rate 150   Fundal Height (cm) 33 cm   Movement Present   Presentation Vertex   Prenatal Vitals    Blood Pressure 142/92   Weight - Scale 91.1 kg (200 lb 12.8 oz)   Urine Albumin/Glucose    Dilation/Effacement/Station    Vaginal Drainage Edema              Problem List          Cardiovascular and Mediastinum    Gestational hypertension, third trimester    Overview     Diastolic BP 92 @ 32 weeks   /84 at 33 weeks --> gHTN  Delivery at 37 weeks  Twice weekly testing  Weekly labs            Other    Supervision of other normal pregnancy, antepartum    Overview       CF/ SMA testing: declines  Covid vaccine: not completed   Declines aneuploidy, NTD screening   Declines flu, tdap vaccines          History of gestational hypertension    Overview     Developed in labor and did not require medical management and resolved after birth. Baseline urine TP/ creatinine at 12 wks:  0.12  Low-dose aspirin until 36 weeks         IUGR (intrauterine growth restriction) in prior pregnancy, pregnant, third trimester    Overview     IUGR was suspected but birth weight was normal, 6lb6oz         34 weeks gestation of pregnancy    Depression affecting pregnancy    Marginal insertion of umbilical cord affecting management of mother in third trimester    Overview     Inferior marginal placental cord insertion site without evidence of low-lying placenta or vasa previa. We reviewed that she may be at increased risk for a funic presentation and  I recommend follow-up at around 32 weeks to assess fetal growth as well as umbilical cord location.           Other Visit Diagnoses       Third trimester pregnancy    -  Primary             Patient states she is unable to do twice weekly testing but can do weekly testing  Will do weekly testing  NST and MARIAM done  Discussed weekly labs and delivery at 37 weeks  Continue BP checks at home        Future Appointments   Date Time Provider [address]  [facility]   12/6/2023  8:45 AM Óscar Ndiaye MD Complete [facility]   12/13/2023  9:00 AM Erin Bazzi MD Complete Brittney Mary MD  11/28/2023  4:12 PM

## 2023-11-28 ENCOUNTER — ROUTINE PRENATAL (OUTPATIENT)
Dept: OBGYN CLINIC | Facility: CLINIC | Age: 23
End: 2023-11-28
Payer: COMMERCIAL

## 2023-11-28 VITALS
BODY MASS INDEX: 35.58 KG/M2 | HEIGHT: 63 IN | WEIGHT: 200.8 LBS | HEART RATE: 99 BPM | DIASTOLIC BLOOD PRESSURE: 92 MMHG | SYSTOLIC BLOOD PRESSURE: 142 MMHG

## 2023-11-28 DIAGNOSIS — O43.193 MARGINAL INSERTION OF UMBILICAL CORD AFFECTING MANAGEMENT OF MOTHER IN THIRD TRIMESTER: ICD-10-CM

## 2023-11-28 DIAGNOSIS — Z34.80 SUPERVISION OF OTHER NORMAL PREGNANCY, ANTEPARTUM: ICD-10-CM

## 2023-11-28 DIAGNOSIS — O09.293 IUGR (INTRAUTERINE GROWTH RESTRICTION) IN PRIOR PREGNANCY, PREGNANT, THIRD TRIMESTER: ICD-10-CM

## 2023-11-28 DIAGNOSIS — Z87.59 HISTORY OF GESTATIONAL HYPERTENSION: ICD-10-CM

## 2023-11-28 DIAGNOSIS — O13.3 GESTATIONAL HYPERTENSION, THIRD TRIMESTER: ICD-10-CM

## 2023-11-28 DIAGNOSIS — Z34.93 THIRD TRIMESTER PREGNANCY: ICD-10-CM

## 2023-11-28 DIAGNOSIS — Z3A.34 34 WEEKS GESTATION OF PREGNANCY: Primary | ICD-10-CM

## 2023-11-28 PROCEDURE — 59025 FETAL NON-STRESS TEST: CPT | Performed by: OBSTETRICS & GYNECOLOGY

## 2023-11-28 PROCEDURE — PNV: Performed by: OBSTETRICS & GYNECOLOGY

## 2023-11-28 PROCEDURE — 76815 OB US LIMITED FETUS(S): CPT | Performed by: OBSTETRICS & GYNECOLOGY

## 2023-11-28 NOTE — PATIENT INSTRUCTIONS
Pregnancy at 28 to [address] [facility]:   You are considered full term at the beginning of 37 weeks. Your breathing may be easier if your baby has moved down into a head-down position. You may need to urinate more often because the baby may be pressing on your bladder. You may also feel more discomfort and get tired easily. DISCHARGE INSTRUCTIONS:   Return to the emergency department if:   You develop a severe headache that does not go away. You have new or increased vision changes, such as blurred or spotted vision. You have new or increased swelling in your face or hands. You have vaginal spotting or bleeding. Your water broke or you feel warm water gushing or trickling from your vagina. Call your obstetrician if:   You have more than 5 contractions in 1 hour. You notice any changes in your baby's movements. You have abdominal cramps, pressure, or tightening. You have a change in vaginal discharge. You have chills or a fever. You have vaginal itching, burning, or pain. You have yellow, green, white, or foul-smelling vaginal discharge. You have pain or burning when you urinate, less urine than usual, or pink or bloody urine. You have questions or concerns about your condition or care. How to care for yourself at this stage of your pregnancy:       Eat a variety of healthy foods. Healthy foods include fruits, vegetables, whole-grain breads, low-fat dairy foods, beans, lean meats, and fish. Drink liquids as directed. Ask how much liquid to drink each day and which liquids are best for you. Limit caffeine to less than 200 milligrams each day. Limit your intake of fish to 2 servings each week. Choose fish low in mercury such as canned light tuna, shrimp, salmon, cod, or tilapia. Do not  eat fish high in mercury such as swordfish, tilefish, lyndsay mackerel, and shark. Take prenatal vitamins as directed.   Your need for certain vitamins and minerals, such as folic acid, increases during pregnancy. Prenatal vitamins provide some of the extra vitamins and minerals you need. Prenatal vitamins may also help to decrease the risk of certain birth defects. Rest as needed. Put your feet up if you have swelling in your ankles and feet. Talk to your healthcare provider about exercise. Moderate exercise can help you stay fit. Your healthcare provider will help you plan an exercise program that is safe for you during pregnancy. Do not smoke. Smoking increases your risk of a miscarriage and other health problems during your pregnancy. Smoking can cause your baby to be born early or weigh less at birth. Ask your healthcare provider for information if you need help quitting. Do not drink alcohol. Alcohol passes from your body to your baby through the placenta. It can affect your baby's brain development and cause fetal alcohol syndrome (FAS). FAS is a group of conditions that causes mental, behavior, and growth problems. Talk to your healthcare provider before you take any medicines. Many medicines may harm your baby if you take them when you are pregnant. Do not take any medicines, vitamins, herbs, or supplements without first talking to your healthcare provider. Never use illegal or street drugs (such as marijuana or cocaine) while you are pregnant. Safety tips:   Avoid hot tubs and saunas. Do not use a hot tub or sauna while you are pregnant, especially during your first trimester. Hot tubs and saunas may raise your baby's temperature and increase the risk of birth defects. Avoid toxoplasmosis. This is an infection caused by eating raw meat or being around infected cat feces. It can cause birth defects, miscarriages, and other problems. Wash your hands after you touch raw meat. Make sure any meat is well-cooked before you eat it. Avoid raw eggs and unpasteurized milk.  Use gloves or ask someone else to clean your cat's litter box while you are pregnant. Ask your healthcare provider about travel. The most comfortable time to travel is during the second trimester. Ask your provider if you can travel after 36 weeks. You may not be able to travel in an airplane after 36 weeks. He or she may also recommend you avoid long road trips. Changes happening with your baby:  By 38 weeks, your baby may weigh between 6 and 9 pounds. Your baby may be about 14 inches long from the top of the head to the rump (baby's bottom). Your baby hears well enough to know your voice. As your baby gets larger, you may feel fewer kicks and more stretching and rolling. Your baby may move into a head-down position. Your baby will also rest lower in your abdomen. What you need to know about prenatal care: Your healthcare provider will check your blood pressure and weight. You may also need the following:  A urine test  may also be done to check for sugar and protein. These can be signs of gestational diabetes or infection. Protein in your urine may also be a sign of preeclampsia. Preeclampsia is a condition that can develop during week 20 or later of your pregnancy. It causes high blood pressure, and it can cause problems with your kidneys and other organs. A gestational diabetes screen  may be done. Your healthcare provider may order either a 1-step or 2-step oral glucose tolerance test (OGTT). 1-step OGTT:  Your blood sugar level will be tested after you have not eaten for 8 hours (fasting). You will then be given a glucose drink. Your level will be tested again 1 hour and 2 hours after you finish the drink. 2-step OGTT:  You do not have to fast for the first part of the test. You will have the glucose drink at any time of day. Your blood sugar level will be checked 1 hour later. If your blood sugar is higher than a certain level, another test will be ordered. You will fast and your blood sugar level will be tested. You will have the glucose drink.  Your blood will be tested again 1 hour, 2 hours, and 3 hours after you finish the glucose drink. A blood test  may be done to check for anemia (low iron level). A Tdap vaccine  may be recommended by your healthcare provider. A group B strep test  is a test that is done to check for group B strep infection. Group B strep is a type of bacteria that may be found in the vagina or rectum. It can be passed to your baby during delivery if you have it. Your healthcare provider will take swab your vagina or rectum and send the sample to the lab for tests. Fundal height  is a measurement of your uterus to check your baby's growth. This number is usually the same as the number of weeks that you have been pregnant. Your healthcare provider may also check your baby's position. Your baby's heart rate  will be checked. Follow up with your obstetrician as directed:  Write down your questions so you remember to ask them during your visits. © Copyright [facility] 2023 Information is for End User's use only and may not be sold, redistributed or otherwise used for commercial purposes. The above information is an  only. It is not intended as medical advice for individual conditions or treatments. Talk to your doctor, nurse or pharmacist before following any medical regimen to see if it is safe and effective for you.

## 2023-11-30 ENCOUNTER — APPOINTMENT (OUTPATIENT)
Dept: LAB | Facility: CLINIC | Age: 23
End: 2023-11-30
Payer: COMMERCIAL

## 2023-12-03 ENCOUNTER — DOCUMENTATION (OUTPATIENT)
Dept: BEHAVIORAL/MENTAL HEALTH CLINIC | Facility: CLINIC | Age: 23
End: 2023-12-03

## 2023-12-03 NOTE — PROGRESS NOTES
Psychotherapy Discharge Summary    Preferred Name: Evelyn Saab  YOB: 2000    Admission date to psychotherapy: June 30, 2023    Referred by: Domenico Tovar    Presenting Problem: Postpartum features    Course of treatment included : individual therapy     Progress/Outcome of Treatment Goals (brief summary of course of treatment) Dawna Gan only attended the intake and one session. She did not require future treatment. Treatment Complications (if any): None noted. Treatment Progress: excellent    Current [facility] Provider: None noted. Discharge Medications include: Please see list.     Discharge Date: December 3, 2023    Discharge Diagnosis: No diagnosis found. Criteria for Discharge: completed treatment goals and objectives and is no longer in need of services    Aftercare recommendations include (include specific referral names and phone numbers, if appropriate): Dawna Gan is encouraged to speak with her provider in order to get a referral for psychiatric services if she needs in the future.      Prognosis: excellent

## 2023-12-05 ENCOUNTER — NURSE TRIAGE (OUTPATIENT)
Dept: OTHER | Facility: OTHER | Age: 23
End: 2023-12-05

## 2023-12-05 ENCOUNTER — HOSPITAL ENCOUNTER (INPATIENT)
Facility: HOSPITAL | Age: 23
LOS: 2 days | Discharge: HOME | End: 2023-12-07
Attending: OBSTETRICS & GYNECOLOGY | Admitting: OBSTETRICS & GYNECOLOGY
Payer: COMMERCIAL

## 2023-12-05 DIAGNOSIS — Z87.59 HISTORY OF GESTATIONAL HYPERTENSION: ICD-10-CM

## 2023-12-05 PROBLEM — O99.340 DEPRESSION AFFECTING PREGNANCY: Status: RESOLVED | Noted: 2023-06-30 | Resolved: 2023-12-05

## 2023-12-05 PROBLEM — O14.90 PREECLAMPSIA: Status: ACTIVE | Noted: 2023-11-15

## 2023-12-05 PROBLEM — O42.90 AMNIOTIC FLUID LEAKING: Status: ACTIVE | Noted: 2023-12-05

## 2023-12-05 PROBLEM — F32.A DEPRESSION AFFECTING PREGNANCY: Status: RESOLVED | Noted: 2023-06-30 | Resolved: 2023-12-05

## 2023-12-05 LAB
ABO GROUP BLD: NORMAL
ALBUMIN SERPL BCP-MCNC: 3.7 G/DL (ref 3.5–5)
ALP SERPL-CCNC: 164 U/L (ref 34–104)
ALT SERPL W P-5'-P-CCNC: 12 U/L (ref 7–52)
ANION GAP SERPL CALCULATED.3IONS-SCNC: 6 MMOL/L
AST SERPL W P-5'-P-CCNC: 17 U/L (ref 13–39)
BASE EXCESS BLDCOA CALC-SCNC: -3.3 MMOL/L (ref 3–11)
BASE EXCESS BLDCOV CALC-SCNC: -4.8 MMOL/L (ref 1–9)
BILIRUB SERPL-MCNC: 0.24 MG/DL (ref 0.2–1)
BLD GP AB SCN SERPL QL: NEGATIVE
BUN SERPL-MCNC: 13 MG/DL (ref 5–25)
CALCIUM SERPL-MCNC: 8.7 MG/DL (ref 8.4–10.2)
CHLORIDE SERPL-SCNC: 104 MMOL/L (ref 96–108)
CO2 SERPL-SCNC: 24 MMOL/L (ref 21–32)
CREAT SERPL-MCNC: 0.68 MG/DL (ref 0.6–1.3)
ERYTHROCYTE [DISTWIDTH] IN BLOOD BY AUTOMATED COUNT: 13 % (ref 11.6–15.1)
GFR SERPL CREATININE-BSD FRML MDRD: 121 ML/MIN/1.73SQ M
GLUCOSE SERPL-MCNC: 79 MG/DL (ref 65–140)
HCO3 BLDCOA-SCNC: 23 MMOL/L (ref 17.3–27.3)
HCO3 BLDCOV-SCNC: 18.9 MMOL/L (ref 12.2–28.6)
HCT VFR BLD AUTO: 34.9 % (ref 34.8–46.1)
HGB BLD-MCNC: 11.4 G/DL (ref 11.5–15.4)
MCH RBC QN AUTO: 25.4 PG (ref 26.8–34.3)
MCHC RBC AUTO-ENTMCNC: 32.7 G/DL (ref 31.4–37.4)
MCV RBC AUTO: 78 FL (ref 82–98)
O2 CT VFR BLDCOA CALC: 11.7 ML/DL
OXYHGB MFR BLDCOA: 51.4 %
OXYHGB MFR BLDCOV: 66.3 %
PCO2 BLDCOA: 45.8 MM[HG] (ref 30–60)
PCO2 BLDCOV: 32 MM HG (ref 27–43)
PH BLDCOA: 7.32 [PH] (ref 7.23–7.43)
PH BLDCOV: 7.39 [PH] (ref 7.19–7.49)
PLATELET # BLD AUTO: 234 THOUSANDS/UL (ref 149–390)
PMV BLD AUTO: 12.1 FL (ref 8.9–12.7)
PO2 BLDCOA: 23.1 MM HG (ref 5–25)
PO2 BLDCOV: 26.4 MM HG (ref 15–45)
POTASSIUM SERPL-SCNC: 3.8 MMOL/L (ref 3.5–5.3)
PROT SERPL-MCNC: 6.7 G/DL (ref 6.4–8.4)
RBC # BLD AUTO: 4.48 MILLION/UL (ref 3.81–5.12)
RH BLD: POSITIVE
SAO2 % BLDCOV: 14.4 ML/DL
SODIUM SERPL-SCNC: 134 MMOL/L (ref 135–147)
SPECIMEN EXPIRATION DATE: NORMAL
TREPONEMA PALLIDUM IGG+IGM AB [PRESENCE] IN SERUM OR PLASMA BY IMMUNOASSAY: NORMAL
WBC # BLD AUTO: 8.73 THOUSAND/UL (ref 4.31–10.16)

## 2023-12-05 PROCEDURE — 86901 BLOOD TYPING SEROLOGIC RH(D): CPT

## 2023-12-05 PROCEDURE — 80053 COMPREHEN METABOLIC PANEL: CPT

## 2023-12-05 PROCEDURE — 87150 DNA/RNA AMPLIFIED PROBE: CPT

## 2023-12-05 PROCEDURE — 85027 COMPLETE CBC AUTOMATED: CPT

## 2023-12-05 PROCEDURE — 88307 TISSUE EXAM BY PATHOLOGIST: CPT | Performed by: PATHOLOGY

## 2023-12-05 PROCEDURE — NC001 PR NO CHARGE: Performed by: OBSTETRICS & GYNECOLOGY

## 2023-12-05 PROCEDURE — 86850 RBC ANTIBODY SCREEN: CPT

## 2023-12-05 PROCEDURE — 86900 BLOOD TYPING SEROLOGIC ABO: CPT

## 2023-12-05 PROCEDURE — 99212 OFFICE O/P EST SF 10 MIN: CPT

## 2023-12-05 PROCEDURE — 82805 BLOOD GASES W/O2 SATURATION: CPT | Performed by: OBSTETRICS & GYNECOLOGY

## 2023-12-05 PROCEDURE — 3E0P7VZ INTRODUCTION OF HORMONE INTO FEMALE REPRODUCTIVE, VIA NATURAL OR ARTIFICIAL OPENING: ICD-10-PCS | Performed by: OBSTETRICS & GYNECOLOGY

## 2023-12-05 PROCEDURE — 86780 TREPONEMA PALLIDUM: CPT

## 2023-12-05 PROCEDURE — 59409 OBSTETRICAL CARE: CPT | Performed by: OBSTETRICS & GYNECOLOGY

## 2023-12-05 RX ORDER — DOCUSATE SODIUM 100 MG/1
100 CAPSULE, LIQUID FILLED ORAL 2 TIMES DAILY
Status: DISCONTINUED | OUTPATIENT
Start: 2023-12-05 | End: 2023-12-07 | Stop reason: HOSPADM

## 2023-12-05 RX ORDER — ACETAMINOPHEN 325 MG/1
650 TABLET ORAL EVERY 6 HOURS SCHEDULED
Status: DISCONTINUED | OUTPATIENT
Start: 2023-12-05 | End: 2023-12-07 | Stop reason: HOSPADM

## 2023-12-05 RX ORDER — DIAPER,BRIEF,INFANT-TODD,DISP
1 EACH MISCELLANEOUS DAILY PRN
Status: DISCONTINUED | OUTPATIENT
Start: 2023-12-05 | End: 2023-12-07 | Stop reason: HOSPADM

## 2023-12-05 RX ORDER — CALCIUM CARBONATE 500 MG/1
1000 TABLET, CHEWABLE ORAL DAILY PRN
Status: DISCONTINUED | OUTPATIENT
Start: 2023-12-05 | End: 2023-12-07 | Stop reason: HOSPADM

## 2023-12-05 RX ORDER — ONDANSETRON 2 MG/ML
4 INJECTION INTRAMUSCULAR; INTRAVENOUS EVERY 8 HOURS PRN
Status: DISCONTINUED | OUTPATIENT
Start: 2023-12-05 | End: 2023-12-07 | Stop reason: HOSPADM

## 2023-12-05 RX ORDER — ONDANSETRON 2 MG/ML
4 INJECTION INTRAMUSCULAR; INTRAVENOUS EVERY 6 HOURS PRN
Status: DISCONTINUED | OUTPATIENT
Start: 2023-12-05 | End: 2023-12-05

## 2023-12-05 RX ORDER — SODIUM CHLORIDE, SODIUM LACTATE, POTASSIUM CHLORIDE, CALCIUM CHLORIDE 600; 310; 30; 20 MG/100ML; MG/100ML; MG/100ML; MG/100ML
125 INJECTION, SOLUTION INTRAVENOUS CONTINUOUS
Status: DISCONTINUED | OUTPATIENT
Start: 2023-12-05 | End: 2023-12-05

## 2023-12-05 RX ORDER — DIPHENHYDRAMINE HYDROCHLORIDE 50 MG/ML
25 INJECTION INTRAMUSCULAR; INTRAVENOUS EVERY 6 HOURS PRN
Status: DISCONTINUED | OUTPATIENT
Start: 2023-12-05 | End: 2023-12-07 | Stop reason: HOSPADM

## 2023-12-05 RX ORDER — OXYTOCIN/RINGER'S LACTATE 30/500 ML
1-30 PLASTIC BAG, INJECTION (ML) INTRAVENOUS
Status: DISCONTINUED | OUTPATIENT
Start: 2023-12-05 | End: 2023-12-05

## 2023-12-05 RX ORDER — LORAZEPAM 2 MG/ML
INJECTION INTRAMUSCULAR
Status: DISPENSED
Start: 2023-12-05 | End: 2023-12-06

## 2023-12-05 RX ORDER — LABETALOL HYDROCHLORIDE 5 MG/ML
20 INJECTION, SOLUTION INTRAVENOUS ONCE
Status: DISCONTINUED | OUTPATIENT
Start: 2023-12-05 | End: 2023-12-05

## 2023-12-05 RX ORDER — IBUPROFEN 600 MG/1
600 TABLET ORAL EVERY 6 HOURS SCHEDULED
Status: DISCONTINUED | OUTPATIENT
Start: 2023-12-06 | End: 2023-12-07 | Stop reason: HOSPADM

## 2023-12-05 RX ORDER — OXYTOCIN/RINGER'S LACTATE 30/500 ML
250 PLASTIC BAG, INJECTION (ML) INTRAVENOUS ONCE
Status: DISCONTINUED | OUTPATIENT
Start: 2023-12-05 | End: 2023-12-07 | Stop reason: HOSPADM

## 2023-12-05 RX ORDER — BUPIVACAINE HYDROCHLORIDE 2.5 MG/ML
30 INJECTION, SOLUTION EPIDURAL; INFILTRATION; INTRACAUDAL ONCE AS NEEDED
Status: DISCONTINUED | OUTPATIENT
Start: 2023-12-05 | End: 2023-12-05

## 2023-12-05 RX ADMIN — SODIUM CHLORIDE 5 MILLION UNITS: 0.9 INJECTION, SOLUTION INTRAVENOUS at 12:56

## 2023-12-05 RX ADMIN — Medication 2 MILLI-UNITS/MIN: at 13:39

## 2023-12-05 RX ADMIN — SODIUM CHLORIDE, SODIUM LACTATE, POTASSIUM CHLORIDE, AND CALCIUM CHLORIDE 125 ML/HR: .6; .31; .03; .02 INJECTION, SOLUTION INTRAVENOUS at 12:50

## 2023-12-05 RX ADMIN — SODIUM CHLORIDE 2.5 MILLION UNITS: 9 INJECTION, SOLUTION INTRAVENOUS at 17:27

## 2023-12-05 NOTE — ASSESSMENT & PLAN NOTE
CBC/CMP wnl, PC ratio prior to admission 0.52  Asymptomatic  Systolic (43AZA), UXK:876 , Min:111 , LDI:965   Diastolic (48VIB), GWJ:18, Min:71, Max:106  Sustained SRBP x2 after delivery (with systolics in 111B) . Declined Labetalol & Mag. +1 reflexes upper, +3 lower. Pt declined IV labetalol at time of severe range blood pressures and declined magnesium infusion. Counseled on risks of developing eclampsia or having neurologic changes.    Plan for nursing to continue to assess for refelexes  Pressures remain mostly normotensive with occasional mild range blood pressure in postpartum period  Continue to monitor  Plan to enroll patient in Obstetric Postpartum Blood Pressure Monitoring Program, patient amenable

## 2023-12-05 NOTE — ASSESSMENT & PLAN NOTE
Cephalic by ultrasound.  Clinical EFW by Leopold's: 6lbs  GBS unknown status, pending, plan to start penicillin  Plan for pitocin aumentation  Analgesia and/or epidural at patient request  Follow up CBC, Syphilis screening, blood type & antibody screen

## 2023-12-05 NOTE — OB LABOR/OXYTOCIN SAFETY PROGRESS
Labor Progress Note - Jerad Montague 20 y.o. female MRN: [id number]    Unit/Bed#: L&D 322-01 Encounter: [id number]       Contraction Frequency (minutes): 0  Contraction Quality: Not applicable  Tachysystole: No   Cervical Dilation: 2        Cervical Effacement: 50  Fetal Station: -3  Baseline Rate: 140 bpm  Fetal Heart Rate: 165 BPM  FHR Category: 1               Vital Signs:   Vitals:    23 1225   BP: 143/98   Pulse: 72   Resp:    Temp:        Notes/comments:   Patient requesting to pump for 30 minutes before starting pitocin. Patient is concerned about the risk of fetal decelerations. Explained to patient that decelerations in fetal heart rate is owing to contractions and not pitocin, and that regardless of the source of contractions, if the fetus was going to have decelerations the source would not make a difference. Explained the risk of waiting to start pitocin including onset of infection leading to fetal distress and potentially requiring a  section. Patient states understanding and requests to delay pitocin by 30 minutes.       Rosalinda Fletcher MD 2023 12:41 PM

## 2023-12-05 NOTE — PLAN OF CARE

## 2023-12-05 NOTE — TELEPHONE ENCOUNTER
Reason for Disposition  • Leakage of fluid from vagina    Answer Assessment - Initial Assessment Questions  1. ONSET: "When did the symptoms begin?"         Large gush of fluid just before 6:30am today    2. CONTRACTIONS: "Are you having any contractions?" If yes, ask: "Describe the contractions that you are having." (e.g., duration, frequency, regularity, severity)      Denies- uncomfortable but nothing cyclical    3. MARQUEZ: "What date are you expecting to deliver?"      1/5/24    4. PARITY: "Have you had a baby before?" If Yes, ask: "How long did the labor last?"      [id number]    5. FETAL MOVEMENT: "Has the baby's movement decreased or changed significantly from normal?"      Normal     6. OTHER SYMPTOMS: "Do you have any other symptoms?" (e.g., abdominal pain, vaginal bleeding, fever, hand/facial swelling)      Denies     35w4d. Contacted on call provider who recommends patient go to L&D for evaluation. Contacted patient to make her aware of on-call providers recommendation. She was agreeable and will be leaving shortly. TC message sent to L&D charge nurse to make aware of above information.     Protocols used: Pregnancy - Rupture of Membranes-ADULT-

## 2023-12-05 NOTE — TELEPHONE ENCOUNTER
Regardin week pregnant water broke  ----- Message from [employer] sent at 2023  6:37 AM EST -----  I am 35 weeks pregnant and my water broke at 630 am having no contractions.

## 2023-12-05 NOTE — H&P
H&P - Obstetrics   Marisel Montague 20 y.o. female MRN: [id number]  Unit/Bed#: L&D 322-01 Encounter: [id number]    Assessment and Plan:  20 y.o.  at 35w4d who is being admitted for PPROM. By issue:  * Amniotic fluid leaking  Assessment & Plan  Cephalic by ultrasound. Clinical EFW by Leopold's: 6lbs  GBS unknown status, pending, plan to start penicillin  Plan for pitocin aumentation  Analgesia and/or epidural at patient request  Follow up CBC, Syphilis screening, blood type & antibody screen     Gestational hypertension, third trimester  Assessment & Plan  CBC/CMP pending, plan to pull P:Cr off of harvey   Asymptomatic  Systolic (92WFS), RNU:183 , Min:131 , SCV:021   Diastolic (55XTE), FAO:83, Min:90, Max:101      Marginal insertion of umbilical cord affecting management of mother in third trimester  Assessment & Plan  33 week ultrasound negative for funic presentation or vasa previa     34 weeks gestation of pregnancy  Assessment & Plan  Received routine prenatal care  EFW 37% at 33w        Plan of care discussed with Dr. Meng Mills. This patient will be an INPATIENT and I certify the anticipated length of stay is >2 Midnights. History of Present Illness     Chief Complaint: "I broke my water"    History of Present Illness:  Matilda Schwartz is a 20 y.o.  with an MARQUEZ of 2024, by Ultrasound at 35w4d weeks gestation who presents with leaking of fluid. Denies chest pain, SOB, lower extremity pain, headache, or  vision changes. Contractions: denies  Loss of fluid: present since 630 am  Vaginal bleeding: denies  Fetal movement: present    ROS otherwise negative.      Obstetrician: [facility]    Pregnancy complications:   GHTN  Marginal insertion of umbilical cord   Depression    OB History    Para Term  AB Living   2 1 1   0 1   SAB IAB Ectopic Multiple Live Births   0 0   0 1      # Outcome Date GA Lbr Parth/2nd Weight Sex Delivery Anes PTL Lv   2 Current            1 Term 10/12/21 39w2d / 00:28 2905 g (6 lb 6.5 oz) F Vag-Spont EPI N JONG      Birth Comments: Followed for possible IUGR with normal Dopplers in pregnancy. Developed COVID in the second trimester. Had mild gestational hypertension in labor that did not require medical management and resolved after delivery       Baby complications/comments: EFW 37% at 33w     Review of Systems  12-point ROS negative unless stated in HPI. Historical Information   Past Medical History:   Diagnosis Date    Depression     Funic presentation 2023     Past Surgical History:   Procedure Laterality Date    FOOT SURGERY      fracture from gymnastics     Social History   Social History     Substance and Sexual Activity   Alcohol Use Not Currently     Social History     Substance and Sexual Activity   Drug Use Never     Social History     Tobacco Use   Smoking Status Never   Smokeless Tobacco Never     Family History:   Family History   Problem Relation Age of Onset    [place]'s disease Father     Ovarian cancer Maternal Grandmother     Diabetes Paternal Grandmother     Lymphoma Paternal Grandmother     Colon cancer Paternal Grandfather         > 48       Meds/Allergies      Medications Prior to Admission   Medication    aspirin 81 mg chewable tablet    Prenatal Multivit-Min-Fe-FA (PRE- PO)      No Known Allergies    Objective:  Vitals: Blood pressure 145/100, pulse 90, temperature 97.8 °F (36.6 °C), temperature source Oral, resp. rate 18, last menstrual period 2023, currently breastfeeding. There is no height or weight on file to calculate BMI.     Physical Exam  GEN: alert and oriented x 3, no apparent distress, appears well  CARDIAC: regular rate and rhythm  PULMONARY: normal effort, clear to auscultation  ABDOMEN: gravid, soft, no tenderness  GENITOURINARY: normal external female genitalia, 2/50/-3   EXTREMITIES: nontender, no edema  FETAL ASSESSMENT:  Fetal heart rate: 140/Moderate variability/15x15 accelerations/no decelerations; Category I  [place]: no contractions    Prenatal Labs:   Blood type: A+  Antibody screen: negative  HIV: non-reactive  Hepatitis B surface antigen: non-reactive  Hepatitis C antibody: non-reactive  Syphilis screening: negative  Gonorrhea/Chlamydia: negative/negative  Rubella: Immune  Varicella: Unknown  Urine culture: Lactobacillus  Fasting GTT: 81 mg/dL  Group B strep: pending  Last Hemoglobin: 11.4 g/dL  Last Platelet count: 681 K    Invasive Devices       None                   Aurelia Pozo MD   PGY-I, OBGYN  12/5/2023  10:09 AM

## 2023-12-05 NOTE — OB LABOR/OXYTOCIN SAFETY PROGRESS
Oxytocin Safety Progress Check Note - Surendra Montague 20 y.o. female MRN: [id number]    Unit/Bed#: L&D 322-01 Encounter: [id number]    Dose (riley-units/min) Oxytocin: 8 riley-units/min  Contraction Frequency (minutes): 3-4  Contraction Quality: Mild  Tachysystole: No   Cervical Dilation: 4        Cervical Effacement: 60  Fetal Station: -2  Baseline Rate: 135 bpm  Fetal Heart Rate: 140 BPM  FHR Category: 1               Vital Signs:   Vitals:    12/05/23 1613   BP:    Pulse:    Resp:    Temp: 97.9 °F (36.6 °C)       Notes/comments:   Pitocin has been on for about 4 hours, patient starting to become more uncomfortable, SVE as above, will continue to increase pitocin as able and plan for next exam in 2-4 hours, will try to limit SVEs to lower infection risk      Todd Mancera MD 12/5/2023 4:41 PM

## 2023-12-06 ENCOUNTER — TELEPHONE (OUTPATIENT)
Dept: PSYCHIATRY | Facility: CLINIC | Age: 23
End: 2023-12-06

## 2023-12-06 LAB
ALBUMIN SERPL BCP-MCNC: 3 G/DL (ref 3.5–5)
ALP SERPL-CCNC: 127 U/L (ref 34–104)
ALT SERPL W P-5'-P-CCNC: 11 U/L (ref 7–52)
ANION GAP SERPL CALCULATED.3IONS-SCNC: 5 MMOL/L
AST SERPL W P-5'-P-CCNC: 18 U/L (ref 13–39)
BILIRUB SERPL-MCNC: 0.18 MG/DL (ref 0.2–1)
BUN SERPL-MCNC: 10 MG/DL (ref 5–25)
CALCIUM ALBUM COR SERPL-MCNC: 9.1 MG/DL (ref 8.3–10.1)
CALCIUM SERPL-MCNC: 8.3 MG/DL (ref 8.4–10.2)
CHLORIDE SERPL-SCNC: 107 MMOL/L (ref 96–108)
CO2 SERPL-SCNC: 23 MMOL/L (ref 21–32)
CREAT SERPL-MCNC: 0.52 MG/DL (ref 0.6–1.3)
ERYTHROCYTE [DISTWIDTH] IN BLOOD BY AUTOMATED COUNT: 12.8 % (ref 11.6–15.1)
GFR SERPL CREATININE-BSD FRML MDRD: 133 ML/MIN/1.73SQ M
GLUCOSE SERPL-MCNC: 91 MG/DL (ref 65–140)
HCT VFR BLD AUTO: 28.3 % (ref 34.8–46.1)
HGB BLD-MCNC: 9.2 G/DL (ref 11.5–15.4)
MCH RBC QN AUTO: 25.3 PG (ref 26.8–34.3)
MCHC RBC AUTO-ENTMCNC: 32.5 G/DL (ref 31.4–37.4)
MCV RBC AUTO: 78 FL (ref 82–98)
PLATELET # BLD AUTO: 193 THOUSANDS/UL (ref 149–390)
PMV BLD AUTO: 11.4 FL (ref 8.9–12.7)
POTASSIUM SERPL-SCNC: 3.9 MMOL/L (ref 3.5–5.3)
PROT SERPL-MCNC: 5.4 G/DL (ref 6.4–8.4)
RBC # BLD AUTO: 3.64 MILLION/UL (ref 3.81–5.12)
SODIUM SERPL-SCNC: 135 MMOL/L (ref 135–147)
WBC # BLD AUTO: 11.21 THOUSAND/UL (ref 4.31–10.16)

## 2023-12-06 PROCEDURE — 85027 COMPLETE CBC AUTOMATED: CPT

## 2023-12-06 PROCEDURE — 80053 COMPREHEN METABOLIC PANEL: CPT

## 2023-12-06 PROCEDURE — 99024 POSTOP FOLLOW-UP VISIT: CPT | Performed by: NURSE PRACTITIONER

## 2023-12-06 RX ORDER — CALCIUM GLUCONATE 94 MG/ML
1 INJECTION, SOLUTION INTRAVENOUS ONCE AS NEEDED
Status: DISCONTINUED | OUTPATIENT
Start: 2023-12-06 | End: 2023-12-07 | Stop reason: HOSPADM

## 2023-12-06 RX ADMIN — DOCUSATE SODIUM 100 MG: 100 CAPSULE, LIQUID FILLED ORAL at 18:41

## 2023-12-06 RX ADMIN — ACETAMINOPHEN 325MG 650 MG: 325 TABLET ORAL at 02:24

## 2023-12-06 RX ADMIN — DOCUSATE SODIUM 100 MG: 100 CAPSULE, LIQUID FILLED ORAL at 07:42

## 2023-12-06 NOTE — L&D DELIVERY NOTE
DELIVERY NOTE  Jatinder Walker 20 y.o. female MRN: [id number]  Unit/Bed#: L&D 322-01 Encounter: [id number]    Obstetrician:    Dr. Mallory Perkins MD    Assistant:   Dr. Malvin Montoya DO    Pre-Delivery Diagnosis:   IUP @ 35w4d   premature rupture of membranes  Preeclampsia without severe features  GBS unknown, s/p adequate PCN in labor  Rh positive      Post-Delivery Diagnosis:   Same as above - Delivered  Viable female fetus      Procedure:  Spontaneous vaginal delivery      Anesthesia: none      Specimens:   Cord blood obtained   Placenta; normal appearing, marginal insertion, intact   Arterial and venous blood gases (below)     Gases:  Umbilical Cord Venous Blood Gas:  Results from last 7 days   Lab Units 23   PH COV  7.390   PCO2 COV mm HG 32.0   HCO3 COV mmol/L 18.9   BASE EXC COV mmol/L -4.8*   O2 CT CD VB mL/dL 14.4   O2 HGB, VENOUS CORD % 87.9     Umbilical Cord Arterial Blood Gas:  Results from last 7 days   Lab Units 23   PH COA  7.319   PCO2 COA  45.8   PO2 COA mm HG 23.1   HCO3 COA mmol/L 23.0   BASE EXC COA mmol/L -3.3*   O2 CONTENT CORD ART ml/dl 11.7   O2 HGB, ARTERIAL CORD % 51.4     Quantitative Blood Loss:   149 mL           Complications:    None apparent    Brief Description of Labor Course:  Jatinder Walker is a 20 y.o.  at 35 weeks and 4 days who was initially admitted for  premature rupture of membranes. Her initial cervical exam was 2/50/-3. She was then started on Pitocin titration for augmentation. On recheck she was 4/60/-2 at 1640. She progressed to complete cervical dilation and began pushing. Pt was in hands and knees position at time of evaluation. Description of Delivery:   With the assistance of maternal expulsive forces, the fetal vertex delivered spontaneously. A nuchal cord was not noted. The anterior left  shoulder was delivered atraumatically, The contralateral arm was delivered immediately after with maternal pushing.  The remainder of the fetus delivered spontaneously at [address] [facility], resulting in a viable female . Upon delivery, the infant was passed underneath the mother and placed on the mothers abdomen and the cord was doubly clamped after several minutes of delayed cord clamping at mother's request. The  was noted to have good tone and cry spontaneously. There was no evidence of injury  The  was examined by nursing at bedside. Umbilical cord blood and umbilical artery and venous gases were collected and sent to the lab. An intact placenta was delivered spontaneously at  using gentle cord traction and was noted to have a marginally-inserted 3-vessel cord. Active management of the third stage of labor was undertaken with IV pitocin at 250 milliunits/min.  Outcome:  Living  with APGARS 9 (1 min) and 9 (5 min).  weight: pending    Inspection of the perineum, vagina, labia, cervix, and urethra revealed a no lacerations. Bleeding was noted to be under control. Uterus firm at umbilicus -1. At the conclusion of the delivery, all needle, sponge, and instrument counts were noted to be correct. Patient tolerated the procedure well and was allowed to recover in labor and delivery room with family and  before being transferred to the post-partum floor. Conclusion:  Mother and baby are currently recovering nicely in stable condition. Attending Supervision:   Dr. Connie Jacob MD was present for the entire procedure.     Dixie Hicks DO  OB/GYN PGY-2  2023 8:32 PM

## 2023-12-06 NOTE — DISCHARGE SUMMARY
Discharge Summary - Matilda Schwartz 20 y.o. female MRN: [id number]    Unit/Bed#: L&D 307-01 Encounter: [id number]    ADMISSION  Admission Date: 2023   Admitting Attending: Dr. Joellen Randall MD  Admitting Diagnoses:   Patient Active Problem List   Diagnosis    Supervision of other normal pregnancy, antepartum    History of gestational hypertension    IUGR (intrauterine growth restriction) in prior pregnancy, pregnant, third trimester    34 weeks gestation of pregnancy    Marginal insertion of umbilical cord affecting management of mother in third trimester    Preeclampsia with severe features, deliverted    Amniotic fluid leaking     (spontaneous vaginal delivery)       DELIVERY  Delivery Method: Vaginal, Spontaneous    Delivery Date and Time: 2023  7:48 PM   Delivery Attendin [facility]  Discharge Date: 23  Discharge Attending: Dr. Lashawn Cristobal  Discharge Diagnosis:   Same, Delivered  Clinical course: Admission to Delivery  Matilda Schwartz is a 20 y.o. Nilsa Khris at 35 weeks and 4 days who was initially admitted for  premature rupture of membranes. Her initial cervical exam was 2/50/-3. She was then started on Pitocin titration for augmentation. On recheck she was 4/60/-2 at 1640. She progressed to complete cervical dilation and began pushing. Pt was in hands and knees position at time of evaluation. Delivery  Route of Delivery: Vaginal, Spontaneous   Anesthesia: none  QBL: Non-Surgical QBL (mL): 149        Delivery: Vaginal, Spontaneous  at 2023  7:48 PM   Laceration: Perineal: None  Repaired? Baby's Weight: 2510 g (5 lb 8.5 oz) ; 88.54     Apgar scores: 9  and 9  at 1 and 5 minutes, respectively      Clinical Course: Post-Delivery:  The post delivery course was notable for diagnosis of preeclampsia with severe features. Pt noted to have 2 sustained systolic severe range blood pressures in 160s that were 15 minutes apart after delivery.  Diagnosis of preeclampsia with severe features was reviewed with patient and she was counseled on standard of care treatment, including risks of delaying or not initiating treatment. L&D team recommended treatment with IV antihypertensives and initiation of magnesium infusion. Pt declined IV antihypertensives and wanted to see if her severe range blood pressures persisted. She also declined magnesium infusion at that time. She was asymptomatic at time of evaluation. Reflexes at time of evaluation were noted to be +1 in upper extremities and +3 in lower extremities with 1 beat of clonus in both ankles. She continued to have occasional mild range blood pressures but remained mostly normotensive through the rest of her postpartum course. On the day of discharge, the patient was ambulating, voiding spontaneously, tolerating oral intake, and hemodynamically stable. She was able to reasonably perform all ADLs. She had appropriate bowel function. Pain was well-controlled. She was discharged home on postpartum/postop day #2 without complications. She was enrolled in the Obstetric Postpartum Blood Pressure Monitoring Program. Patient was instructed to follow up with her OB as an outpatient and was given appropriate warnings to call her provider with problems or concerns. Pertinent lab findings included:   Blood type A positive.      Last three Hgb values:  Lab Results   Component Value Date    HGB 9.2 (L) 2023    HGB 11.4 (L) 2023    HGB 10.8 (L) 2023        Problem-specific follow-up plans included the following:  Problem List          Cardiovascular and Mediastinum    Preeclampsia with severe features, deliverted    Overview     Diastolic BP 92 @ 32 weeks   /84 at 33 weeks --> gHTN  Planned delivery at 37 weeks, spontaneous  labor at 35w 4d  Postpartum severe range BP two readings <2hrs after delivery         Current Assessment & Plan     CBC/CMP wnl, PC ratio prior to admission 0. 52  Asymptomatic  Systolic (84FDI), YJL:247 , Min:111 , AGE:695   Diastolic (71ALV), ZTX:45, Min:71, Max:106  Sustained SRBP x2 after delivery (with systolics in 978T) . Declined Labetalol & Mag. +1 reflexes upper, +3 lower. Pt declined IV labetalol at time of severe range blood pressures and declined magnesium infusion. Counseled on risks of developing eclampsia or having neurologic changes. Plan for nursing to continue to assess for refelexes  Pressures remain mostly normotensive with occasional mild range blood pressure in postpartum period  Continue to monitor  Plan to enroll patient in Obstetric Postpartum Blood Pressure Monitoring Program, patient amenable              Other    * (Principal)  (spontaneous vaginal delivery)    Current Assessment & Plan          Continue routine post partum care  Pain well controlled: tylenol/motrin scheduled  Lochia within normal limits: continue to monitor   OOB: as able, encourage ambulation  Passing flatus  Voiding spontaneously  Breastfeeding  Baby in: room  Dispo: anticipate d/c home pending BP management           Supervision of other normal pregnancy, antepartum    Overview       CF/ SMA testing: declines  Covid vaccine: not completed   Declines aneuploidy, NTD screening   Declines flu, tdap vaccines          History of gestational hypertension    Overview     Developed in labor and did not require medical management and resolved after birth. Baseline urine TP/ creatinine at 12 wks:  0.12  Low-dose aspirin until 36 weeks         IUGR (intrauterine growth restriction) in prior pregnancy, pregnant, third trimester    Overview     IUGR was suspected but birth weight was normal, 6lb6oz         34 weeks gestation of pregnancy    Marginal insertion of umbilical cord affecting management of mother in third trimester    Overview     Inferior marginal placental cord insertion site without evidence of low-lying placenta or vasa previa.   We reviewed that she may be at increased risk for a funic presentation and  I recommend follow-up at around 32 weeks to assess fetal growth as well as umbilical cord location. Amniotic fluid leaking     Other Visit Diagnoses       Postpartum care and examination of lactating mother    -  Primary             Discharge med list:     Medication List      START taking these medications     acetaminophen 325 mg tablet; Commonly known as: TYLENOL; Take 2 tablets   (650 mg total) by mouth every 6 (six) hours   benzocaine-menthol-lanolin-aloe 20-0.5 % topical spray; Commonly known   as: DERMOPLAST; Apply 1 Application topically every 6 (six) hours as   needed for irritation or mild pain   Blood Pressure Monitor Automat Laura; Use once for 1 dose   ibuprofen 600 mg tablet; Commonly known as: MOTRIN; Take 1 tablet (600   mg total) by mouth every 6 (six) hours   witch hazel-glycerin topical pad; Commonly known as: TUCKS; Apply 1 Pad   topically every 4 (four) hours as needed for irritation     CONTINUE taking these medications     aspirin 81 mg chewable tablet; Chew 2 tablets (162 mg total) daily   PRE-SYLVIE PO       Condition at discharge:   good     Disposition:   See After Visit Summary for discharge disposition information.     Planned Readmission:   No    Nafisa Martínez MD  PGY-1 OBGYN

## 2023-12-06 NOTE — LACTATION NOTE
This note was copied from a baby's chart. CONSULT - LACTATION  Baby Girl Abhilash Montague 1 days female MRN: [id number]    [facility] Room / Bed: L&D 307(N)/L&D 307(N) Encounter: [id number]    Maternal Information     MOTHER:  Angy Montague  Maternal Age: 20 y.o.   OB History: # 1 - Date: 10[birth year], Sex: Female, Weight: 2905 g (6 lb 6.5 oz), GA: 39w2d, Delivery: Vaginal, Spontaneous, Apgar1: 9, Apgar5: 9, Living: Living, Birth Comments: Followed for possible IUGR with normal Dopplers in pregnancy. Developed COVID in the second trimester. Had mild gestational hypertension in labor that did not require medical management and resolved after delivery    # 2 - Date: 23, Sex: Female, Weight: 2510 g (5 lb 8.5 oz), GA: 35w4d, Delivery: Vaginal, Spontaneous, Apgar1: 9, Apgar5: 9, Living: Living, Birth Comments: None   Previouse breast reduction surgery? No    Lactation history:   Has patient previously breast fed: Yes   How long had patient previously breast fed: 18 mos   Previous breast feeding complications:       Past Surgical History:   Procedure Laterality Date    FOOT SURGERY      fracture from gymnastics        Birth information:  YOB: 2025   Time of birth: 7:48 PM   Sex: female   Delivery type: Vaginal, Spontaneous   Birth Weight: 2510 g (5 lb 8.5 oz)   Percent of Weight Change: 0%     Gestational Age: 32w2d   [unfilled]    Assessment     Breast and nipple assessment: normal assessment     Assessment: normal assessment    Feeding assessment: feeding well  LATCH:  Latch: Grasps breast, tongue down, lips flanged, rhythmic sucking   Audible Swallowing: Spontaneous and intermittent (24 hours old)   Type of Nipple: Everted (After stimulation)   Comfort (Breast/Nipple): Soft/non-tender   Hold (Positioning): Partial assist, teach one side, mother does other, staff holds   LATCH Score: 9        Having latch problems?  No   Position(s) Used Crunchbutton Breasts/Nipples   Left Breast Soft   Right Breast Soft   Left Nipple Everted   Right Nipple Everted   Intervention Hand expression   Breastfeeding Status Yes   Breastfeeding Progress Not yet established;Breastfeeding well   Breast Pump   Pump 3  (CM consult for SS9)   Patient Follow-Up   Lactation Consult Status 2   Follow-Up Type Inpatient;Call as needed   Other OB Lactation Documentation    Additional Problem Noted Jena Cullen is a LPI baby who was able to latch well to the breast when alignment was provided. HE is highly effective. Education provided on LPI status babies. (Declines RSB and D/C booklets.)       Information on hand expression given. Discussed benefits of knowing how to manually express breast including stimulating milk supply, softening nipple for latch and evacuating breast in the event of engorgement. Reviewed how to bring baby to the breast so that her lower lip and chin touch the breast with her nose just above the nipple to encourage a wider, more asymmetric latch. Encouraged parents to call for assistance, questions, and concerns about breastfeeding. Extension provided.     Feeding recommendations:  breast feed on demand    Xavier Mcmullen RN 12/6/2023 4:18 PM

## 2023-12-06 NOTE — NURSING NOTE
D/C teaching reviewed with pt and . Save Your Life magnet reviewed and given. Understanding verbalized and all questions answered at this time.

## 2023-12-06 NOTE — PLAN OF CARE
Problem: BIRTH - VAGINAL/ SECTION  Goal: Fetal and maternal status remain reassuring during the birth process  Description: INTERVENTIONS:  - Monitor vital signs  - Monitor fetal heart rate  - Monitor uterine activity  - Monitor labor progression (vaginal delivery)  - DVT prophylaxis  - Antibiotic prophylaxis  2023 by Enma Reyez  Outcome: Completed  2023 by Enma Reyez  Outcome: Progressing  Goal: Emotionally satisfying birthing experience for mother/fetus  Description: Interventions:  - Assess, plan, implement and evaluate the nursing care given to the patient in labor  - Advocate the philosophy that each childbirth experience is a unique experience and support the family's chosen level of involvement and control during the labor process   - Actively participate in both the patient's and family's teaching of the birth process  - Consider cultural, Sikh and age-specific factors and plan care for the patient in labor  2023 by Enma Reyez  Outcome: Completed  2023 by Enma Reyez  Outcome: Progressing

## 2023-12-06 NOTE — PROGRESS NOTES
POSTPARTUM NOTE  Dante Salgado 20 y.o. female MRN: [id number]  Unit/Bed#: L&D 307-01 Encounter: [id number]    Dante Salgado is a   at postpartum day 1 from a vaginal delivery on 2023 at [address] [place] [address],[address]. Baby is at bedside. Complications included: preeclampsia w/severe features. Review of Systems:   -Constitutional: denies headache, ambulating without issue, reports pain currently 2/10   -Breasts: denies tenderness   -Cardiovascular: denies chest pain or SOB   -Gynecologic: lochia moderate   -Urinary: voiding without issue   -GI: tolerating PO, passing flatus, no bowel movement yet    Physical Exam:   -Vitals:   Vitals:    23 0400 23 0500 23 0700 23 0800   BP: 136/98 140/95 (!) 154/106    BP Location: Right arm Right arm Left arm    Pulse: 80  65    Resp: 16  18    Temp:   97.7 °F (36.5 °C)    TempSrc:   Oral    SpO2: 98%   98%   Weight:       Height:          -General: A&Ox3, no acute distress noted   -Pulmonary: normal effort, no SOB noted   -Cardiovascular: regular HR   -Abdomen: soft, non-tender, non-distended, + bowel sounds x4 quadrants, uterine fundus firm @at the umbilicus   -Extremities: nontender, no edema noted, 3+ DTR's BLE   -Breasts: deferred   -Pelvic exam: deferred    Results from last 7 days   Lab Units 2318 23  1054 23  1243   HEMOGLOBIN g/dL 9.2* 11.4* 10.8*   HEMATOCRIT % 28.3* 34.9 33.2*   MCV fL 78* 78* 81*     Results from last 7 days   Lab Units 23  0618 23  1054 23  1243   BUN mg/dL 10 13 11   CREATININE mg/dL 0.52* 0.68 0.69     Results from last 7 days   Lab Units 2318 23  1054 23  1243   AST U/L 18 17 16   ALT U/L 11 12 10     Assessment/Plan:  1. Continue routine postpartum care. Ambulation and self-care encouraged. 2. Contraception: Declines. 3. Feeding infant via breast.  4. She is Rh positive. Rhogam is not indicated. 5. Vaccine status:  No vaccines are currently indicated.   6. Continues to decline anti-HTN medications. Will check BP every 2 hours. Reviewed with patient that we cannot discharge her to home until BP's are well-controlled.     Ángel Brown, [address] [place] [address]  12/6/2023

## 2023-12-06 NOTE — PROGRESS NOTES
Hypertension post delivery    Dawna Gan had 2 high blood pressures in the 160s 15 minutes apart at 2109 and 2124. I was notified at 2129 and placed an order for labetalol 20 mg IV. Dawna Gan declined the treatment and requested to "wait until I have 3 or 4 in a row elevated."  Dr. Jarret Robles and I went to the bedside to discuss the diagnosis and management of preeclampsia. Dawna Gan already has the diagnosis of preeclampsia without severe features. We explained the spectrum of hypertensive disorders of pregnancy including progression of preeclampsia without severe features to preeclampsia with severe features and eclampsia and subsequent complications including seizure or stroke. We explained the standard of care including prompt treatment of severe range blood pressures after 2 readings. Over the course of our conversation another 15 minutes had elapsed and the repeat blood pressure was taken. It was no longer severe. I explained to Dawna Gan that we still recommend maintaining the diagnosis of preeclampsia with severe features. We still recommend frequent blood pressure checks and will continue them every 15 minutes. Given the nonsevere blood pressure reading, it is reasonable to withhold labetalol 20 mg at this time. Nevertheless we still recommend IV magnesium sulfate for seizure prophylaxis. Dawna Gan refuses magnesium sulfate for seizure prophylaxis at this time. She states if her blood pressure is elevated again she would except labetalol. We will continue to monitor closely and administer IV labetalol if any one reading is elevated to severe range. Should this circumstance arise, we will discuss and recommend magnesium sulfate seizure prophylaxis again. Patient denies symptoms of preeclampsia at the moment. She denies headache, vision changes, or right upper quadrant pain. I checked her reflexes and they are 1+ in her upper extremities and 3+ in her lower extremities.   She has 1 beat of clonus in both ankles. I explained the importance of monitoring reflexes as it reflects neuronal excitability. Patient is agreeable to continued observation but declines magnesium sulfate.     Ran Ozuna MD  OB/GYN  12/5/2023 9:51 PM

## 2023-12-06 NOTE — PLAN OF CARE
Problem: POSTPARTUM  Goal: Experiences normal postpartum course  Description: INTERVENTIONS:  - Monitor maternal vital signs  - Assess uterine involution and lochia  2023 by Sharon Reed  Outcome: Progressing  2023 by Sharon Reed  Outcome: Progressing  Goal: Appropriate maternal -  bonding  Description: INTERVENTIONS:  - Identify family support  - Assess for appropriate maternal/infant bonding   -Encourage maternal/infant bonding opportunities  - Referral to  or  as needed  2023 by Sharon Reed  Outcome: Progressing  2023 by Sharon Reed  Outcome: Progressing  Goal: Establishment of infant feeding pattern  Description: INTERVENTIONS:  - Assess breast/bottle feeding  - Refer to lactation as needed  2023 by Sharon Reed  Outcome: Progressing  2023 by Sharon Reed  Outcome: Progressing  Goal: Incision(s), wounds(s) or drain site(s) healing without S/S of infection  Description: INTERVENTIONS  - Assess and document dressing, incision, wound bed, drain sites and surrounding tissue  - Provide patient and family education  2023 by Sharon Reed  Outcome: Progressing  2023 by Sharon Reed  Outcome: Progressing

## 2023-12-07 VITALS
SYSTOLIC BLOOD PRESSURE: 147 MMHG | WEIGHT: 200.81 LBS | OXYGEN SATURATION: 99 % | DIASTOLIC BLOOD PRESSURE: 108 MMHG | TEMPERATURE: 97.9 F | HEIGHT: 63 IN | BODY MASS INDEX: 35.58 KG/M2 | RESPIRATION RATE: 16 BRPM | HEART RATE: 76 BPM

## 2023-12-07 LAB
DME PARACHUTE DELIVERY DATE ACTUAL: NORMAL
DME PARACHUTE DELIVERY DATE REQUESTED: NORMAL
DME PARACHUTE ITEM DESCRIPTION: NORMAL
DME PARACHUTE ORDER STATUS: NORMAL
DME PARACHUTE SUPPLIER NAME: NORMAL
DME PARACHUTE SUPPLIER PHONE: NORMAL
GP B STREP DNA SPEC QL NAA+PROBE: NEGATIVE

## 2023-12-07 PROCEDURE — 99024 POSTOP FOLLOW-UP VISIT: CPT | Performed by: OBSTETRICS & GYNECOLOGY

## 2023-12-07 RX ORDER — IBUPROFEN 600 MG/1
600 TABLET ORAL EVERY 6 HOURS SCHEDULED
Qty: 30 TABLET | Refills: 0 | Status: SHIPPED | OUTPATIENT
Start: 2023-12-07

## 2023-12-07 RX ORDER — ACETAMINOPHEN 325 MG/1
650 TABLET ORAL EVERY 6 HOURS SCHEDULED
Refills: 0
Start: 2023-12-07

## 2023-12-07 RX ADMIN — ACETAMINOPHEN 325MG 650 MG: 325 TABLET ORAL at 09:54

## 2023-12-07 RX ADMIN — DOCUSATE SODIUM 100 MG: 100 CAPSULE, LIQUID FILLED ORAL at 09:54

## 2023-12-07 RX ADMIN — ACETAMINOPHEN 325MG 650 MG: 325 TABLET ORAL at 01:25

## 2023-12-07 NOTE — CASE MANAGEMENT
Case Management Progress Note    Patient name Marilyn Marvin  Location L&D 307/L&D 025-34 MRN [id number]  : 2000 Date 2023       LOS (days): 2  Geometric Mean LOS (GMLOS) (days):   Days to GMLOS:        OBJECTIVE:        Current admission status: Inpatient  Preferred Pharmacy:   [address] [place] [address] #[id number] Denice Muse, [address]  [address] [place] [id number]  Phone: [phone] Fax: [phone]    Primary Care Provider: No primary care provider on file. Primary Insurance: [facility]  Secondary Insurance:     PROGRESS NOTE:    CM received consult for breast pump. [facility]OB requesting Spectra S9 pump. CM placed order via [employer]. Order approved. Pump will be sent out for shipping to home today. CM informed [facility].

## 2023-12-07 NOTE — PLAN OF CARE
Problem: POSTPARTUM  Goal: Experiences normal postpartum course  Description: INTERVENTIONS:  - Monitor maternal vital signs  - Assess uterine involution and lochia  2023 by Brooke Morillo RN  Outcome: Completed  2023 by Brooke Morillo RN  Outcome: Progressing  Goal: Appropriate maternal -  bonding  Description: INTERVENTIONS:  - Identify family support  - Assess for appropriate maternal/infant bonding   -Encourage maternal/infant bonding opportunities  - Referral to  or  as needed  2023 by Brooke Morillo RN  Outcome: Completed  2023 by Brooke Morillo RN  Outcome: Progressing  Goal: Establishment of infant feeding pattern  Description: INTERVENTIONS:  - Assess breast/bottle feeding  - Refer to lactation as needed  2023 by Brooke Morillo RN  Outcome: Completed  2023 by Brooke Morillo RN  Outcome: Progressing  Goal: Incision(s), wounds(s) or drain site(s) healing without S/S of infection  Description: INTERVENTIONS  - Assess and document dressing, incision, wound bed, drain sites and surrounding tissue  - Provide patient and family education  - Perform skin care/dressing changes every   2023 by Brooke Morillo RN  Outcome: Completed  2023 by Brooke Morillo RN  Outcome: Progressing

## 2023-12-07 NOTE — ASSESSMENT & PLAN NOTE
Continue routine post partum care  Pain well controlled: tylenol/motrin scheduled  Lochia within normal limits: continue to monitor   OOB: as able, encourage ambulation  Passing flatus  Voiding spontaneously  Breastfeeding  Baby in: room  Dispo: anticipate d/c home pending BP management

## 2023-12-07 NOTE — PROGRESS NOTES
Patient enrolled in Home BP monitoring program with [employer] BP cuff. Confirmed with Yousif Alaniz was received.

## 2023-12-07 NOTE — PROGRESS NOTES
Obstetrics Progress Note  Lakshmi Villalobos 20 y.o. female MRN: [id number]  Unit/Bed#: L&D 307-01 Encounter: [id number]    Assessment/Plan:    Postpartum Day #2 s/p  delivery complicated by preeclampsia with severe features (sustained severe range blood pressures) but declined labetalol, magnesium, and oral antihypertensive medication, currently stable. Baby in room. By issue:    *  (spontaneous vaginal delivery)  Assessment & Plan       Continue routine post partum care  Pain well controlled: tylenol/motrin scheduled  Lochia within normal limits: continue to monitor   OOB: as able, encourage ambulation  Passing flatus  Voiding spontaneously  Breastfeeding  Baby in: room  Dispo: anticipate d/c home pending BP management      Preeclampsia with severe features, deliverted  Assessment & Plan  CBC/CMP wnl, PC ratio prior to admission 0.52  Asymptomatic  Systolic (66XZR), EOE:752 , Min:111 , XSJ:443   Diastolic (62VOB), GNY:27, Min:71, Max:106  Sustained SRBP x2 after delivery (with systolics in 590H) . Declined Labetalol & Mag. +1 reflexes upper, +3 lower. Pt declined IV labetalol at time of severe range blood pressures and declined magnesium infusion. Counseled on risks of developing eclampsia or having neurologic changes. Plan for nursing to continue to assess for refelexes  Pressures remain mostly normotensive with occasional mild range blood pressure in postpartum period  Continue to monitor  Plan to enroll patient in Obstetric Postpartum Blood Pressure Monitoring Program, patient amenable        Subjective/Objective     Subjective:   No acute events overnight. Pain: controlled. Tolerating PO: yes. Voiding: yes. Flatus: passing. Ambulating: yes. Chest pain: no. Shortness of breath: no. Leg pain: no. Lochia: within normal limits.  Baby in room, feeding via breast.    Objective:   Vitals:   Temp:  [97.7 °F (36.5 °C)-98.6 °F (37 °C)] 97.9 °F (36.6 °C)  HR:  [65-86] 85  Resp:  [18] 18  BP: (111-154)/() 132/90 No intake or output data in the 24 hours ending 12/07/23 0645    Lab Results   Component Value Date    WBC 11.21 (H) 12/06/2023    HGB 9.2 (L) 12/06/2023    HCT 28.3 (L) 12/06/2023    MCV 78 (L) 12/06/2023     12/06/2023       Physical Exam:   General: alert and oriented x3, in no apparent distress  Cardiovascular: regular rate  Pulmonary: normal effort  Abdomen: Soft, non-tender, non-distended, no rebound or guarding.  Uterine fundus firm and non-tender, +1 cm above the umbilicus  Extremities: Non tender with no edema      Eagle Gonzalez MD  PGY-I, OBGYN  12/7/2023, 6:45 AM

## 2023-12-07 NOTE — UTILIZATION REVIEW
Mother and baby discharged on 2023     NOTIFICATION OF INPATIENT ADMISSION   MATERNITY/DELIVERY AUTHORIZATION REQUEST   SERVICING FACILITY:   [address] [place], [place] [address]t e - L&D, [place], NICU  [address]  Tax ID: [id number]  NPI: [id number] ATTENDING PROVIDER:  Attending Name and NPI#: Bisi Ortega Md [[id number]]  Address: [address]  Phone: [address] [place] [address]  Place of Service Code: 21  Inpatient Admission Date/Time: 23 10:09 AM  Discharge Date/Time: 2023  1:44 PM  Admitting Diagnosis Code/Description:  Vaginal discharge during pregnancy in third trimester [O26.893, N89.8]   Mother: Fabienne Caban 2000 Estimated Date of Delivery: 24  Delivering clinician: Rich Plaza    OB History          2    Para   2    Term   1       1    AB   0    Living   2         SAB   0    IAB   0    Ectopic        Multiple   0    Live Births   2                Name & MRN:   Information for the patient's :  Sugey Brice Girl Jerad Paulina) [[id number]]   [place] Delivery Information:  Sex: female  Delivered 2023 7:48 PM by Vaginal, Spontaneous; Gestational Age: 32w2d    [place] Measurements:  Weight: 5 lb 8.5 oz (2510 g); Height: 18"    APGAR 1 minute 5 minutes 10 minutes   Totals: 9 9      [place] Birth Information: 20 y.o. female MRN: [id number] Unit/Bed#: L&D 307-01   Birthweight: No birth weight on file. Gestational Age: <None> Delivery Type:    APGARS Totals:        UTILIZATION REVIEW CONTACT:  Nile Seip, Utilization   Network Utilization Review Department  Phone: [phone]  Fax [phone]  Email: [email or web address]  Contact for approvals/pending authorizations, clinical reviews, and discharge.    PHYSICIAN ADVISORY SERVICES:  Medical Necessity Denial & Zygw-tb-Rkrc Review  Phone: [phone]  Fax: [phone]  Email: [email or web address]   DISCHARGE SUPPORT TEAM:  For Patients Discharge Needs & Updates  Phone: [phone] opt. 2 Fax: [phone]  Email: [email or web address]        NOTIFICATION OF ADMISSION DISCHARGE   This is a Notification of Discharge from [address] Please be advised that this patient has been discharge from our facility. Below you will find the admission and discharge date and time including the patient’s disposition. UTILIZATION REVIEW CONTACT:  Maria A Pompa  Utilization   Network Utilization Review Department  Phone: [phone] x carefully listen to the prompts. All voicemails are confidential.  Email: [email or web address]     ADMISSION INFORMATION  PRESENTATION DATE: 12/5/2023  8:44 AM  OBERVATION ADMISSION DATE:   INPATIENT ADMISSION DATE: 12/5/23 10:09 AM   DISCHARGE DATE: 12/7/2023  1:44 PM   DISPOSITION:Home/Self Care    Network Utilization Review Department  ATTENTION: Please call with any questions or concerns to [phone] and carefully listen to the prompts so that you are directed to the right person. All voicemails are confidential.   For Discharge needs, contact Care Management DC Support Team at [phone] opt. 2  Send all requests for admission clinical reviews, approved or denied determinations and any other requests to dedicated fax number below belonging to the campus where the patient is receiving treatment.  List of dedicated fax numbers for the Facilities:  [place] DENIALS (Administrative/Medical Necessity) [phone]   DISCHARGE SUPPORT TEAM ([phone] [facility] (Maternity/NICU/Pediatrics) [phone] 333 e [facility] [address] [place] [address] [address] [address] [facility] [address] [address] [address] [phone]

## 2023-12-07 NOTE — LACTATION NOTE
This note was copied from a baby's chart. CONSULT - LACTATION  Baby Girl Cesilia Montague 2 days female MRN: [id number]    [facility] Room / Bed: L&D 307(N)/L&D 307(N) Encounter: [id number]    Maternal Information     MOTHER:  Angy Montague  Maternal Age: 20 y.o.   OB History: # 1 - Date: 10/12/21, Sex: Female, Weight: 2905 g (6 lb 6.5 oz), GA: 39w2d, Delivery: Vaginal, Spontaneous, Apgar1: 9, Apgar5: 9, Living: Living, Birth Comments: Followed for possible IUGR with normal Dopplers in pregnancy. Developed COVID in the second trimester. Had mild gestational hypertension in labor that did not require medical management and resolved after delivery    # 2 - Date: 23, Sex: Female, Weight: 2510 g (5 lb 8.5 oz), GA: 35w4d, Delivery: Vaginal, Spontaneous, Apgar1: 9, Apgar5: 9, Living: Living, Birth Comments: None   Previouse breast reduction surgery? No    Lactation history:   Has patient previously breast fed: Yes   How long had patient previously breast fed: 18 mos   Previous breast feeding complications:       Past Surgical History:   Procedure Laterality Date    FOOT SURGERY      fracture from gymnastics        Birth information:  YOB: 2025   Time of birth: 7:48 PM   Sex: female   Delivery type: Vaginal, Spontaneous   Birth Weight: 2510 g (5 lb 8.5 oz)   Percent of Weight Change: -4%     Gestational Age: 32w2d   [unfilled]    Assessment     Breast and nipple assessment: normal assessment    [place] Assessment: normal assessment    Feeding assessment: feeding well  LATCH:  Latch: Grasps breast, tongue down, lips flanged, rhythmic sucking   Audible Swallowing: Spontaneous and intermittent (24 hours old)   Type of Nipple: Everted (After stimulation)   Comfort (Breast/Nipple): Soft/non-tender   Hold (Positioning): No assist from staff, mother able to position/hold infant   LATCH Score: 10        Having latch problems?  No   Position(s) Used Jobber Breasts/Nipples   Left Breast Soft;Filling   Right Breast Soft;Filling   Left Nipple Everted   Right Nipple Everted   Breastfeeding Status Yes   Breastfeeding Progress Breastfeeding well   Patient Follow-Up   Lactation Consult Status 2   Follow-Up Type Inpatient;Call as needed   Other OB Lactation Documentation    Additional Problem Noted Abhilash Aquino is feeding Kimberly Renteria well independently with little need for guidance or reassurance. Feeding recommendations:  breast feed on demand      Abhilash Aquino verbalizes understanding of community resources if she develops challenges with breastfeeding. Encouraged parents to call for assistance, questions, and concerns about breastfeeding. Extension provided.       Roel Fraire RN 12/7/2023 1:03 PM

## 2023-12-08 ENCOUNTER — TELEPHONE (OUTPATIENT)
Age: 23
End: 2023-12-08

## 2023-12-08 NOTE — TELEPHONE ENCOUNTER
Left message on machine for pt to call office re: just reaching out to see how patient doing after delivery. Advised patient to call if any concerns otherwise keep appt as scheduled 12/12/23.

## 2023-12-11 PROCEDURE — 88307 TISSUE EXAM BY PATHOLOGIST: CPT | Performed by: PATHOLOGY

## 2023-12-12 ENCOUNTER — TELEPHONE (OUTPATIENT)
Dept: OBGYN CLINIC | Facility: CLINIC | Age: 23
End: 2023-12-12

## 2023-12-12 ENCOUNTER — POSTPARTUM VISIT (OUTPATIENT)
Dept: OBGYN CLINIC | Facility: CLINIC | Age: 23
End: 2023-12-12

## 2023-12-12 ENCOUNTER — APPOINTMENT (OUTPATIENT)
Dept: LAB | Facility: CLINIC | Age: 23
End: 2023-12-12
Payer: COMMERCIAL

## 2023-12-12 VITALS
SYSTOLIC BLOOD PRESSURE: 150 MMHG | WEIGHT: 185 LBS | BODY MASS INDEX: 32.78 KG/M2 | HEART RATE: 103 BPM | DIASTOLIC BLOOD PRESSURE: 92 MMHG | HEIGHT: 63 IN

## 2023-12-12 PROBLEM — Z3A.35 35 WEEKS GESTATION OF PREGNANCY: Status: ACTIVE | Noted: 2023-06-01

## 2023-12-12 PROCEDURE — 99024 POSTOP FOLLOW-UP VISIT: CPT | Performed by: PHYSICIAN ASSISTANT

## 2023-12-12 RX ORDER — NIFEDIPINE 30 MG/1
30 TABLET, EXTENDED RELEASE ORAL DAILY
Qty: 30 TABLET | Refills: 0 | Status: SHIPPED | OUTPATIENT
Start: 2023-12-12 | End: 2023-12-21 | Stop reason: SDUPTHER

## 2023-12-12 NOTE — PROGRESS NOTES
The patient is a 20 y.o. who presents for a postpartum visit. She is 1 week postpartum following a  delivery on 23. The delivery was at 35w4d gestational weeks. Complications include: pre eclampsia with severe features. Patient says blood pressures at home have mostly been 140/90. Patient says blood pressure yesterday 150/100. Has been having headaches. Yesterday had some visual change. Headache better this am.  She does feel if she is resting and not moving around a lot her BP is normal but when up moving around feels BP goes up. Patient says she declined any intervention in the hospital with meds/ magnesium because her last pregnancy the elevated blood pressure resolved. She is ready to consider medication as she realizes her blood pressure is remaining elevated. Baby " Arvin Kamara" is doing well. Baby is feeding by breast.     Bleeding is light changing pad for hygiene purposes. Bowel function is normal. Bladder function is normal. Patient has not been sexually active. Contraception plans: condoms. Postpartum Depression Scale: 9  Patient says she feels she is doing well and bonding with baby. She denies any concerns with mood at this time but will monitor.            SBIRT screen:  negative    Pediatrician: [facility]       Current Outpatient Medications on File Prior to Visit   Medication Sig Dispense Refill    acetaminophen (TYLENOL) 325 mg tablet Take 2 tablets (650 mg total) by mouth every 6 (six) hours  0    aspirin 81 mg chewable tablet Chew 2 tablets (162 mg total) daily 180 tablet 2    benzocaine-menthol-lanolin-aloe (DERMOPLAST) 20-0.5 % topical spray Apply 1 Application topically every 6 (six) hours as needed for irritation or mild pain  0    ibuprofen (MOTRIN) 600 mg tablet Take 1 tablet (600 mg total) by mouth every 6 (six) hours 30 tablet 0    Prenatal Multivit-Min-Fe-FA (PRE-SYLVIE PO) Take by mouth      witch hazel-glycerin (TUCKS) topical pad Apply 1 Pad topically every 4 (four) hours as needed for irritation  0     No current facility-administered medications on file prior to visit. Vitals:    12/12/23 1101   BP: 150/92   Pulse:    Initial BP: 152/98        Assessment:  Postpartum exam    Plan:  Case was reviewed with Dr. Terrance Rawls  Recommend eval no in ED with serial blood pressures and labs. Patient declines ED eval now. She is willing to go for out patient labs now and start blood pressure medication. She agrees to go to ER if worsening headache, any visual change, abdominal pain or increased swelling. She is aware risk of seizure in pre eclampsia without magnesium.   Nifedepine XL 30mg daily # 30 0 RF  Return to office 1 week

## 2023-12-13 ENCOUNTER — TELEPHONE (OUTPATIENT)
Facility: HOSPITAL | Age: 23
End: 2023-12-13

## 2023-12-14 ENCOUNTER — DOCUMENTATION (OUTPATIENT)
Dept: PERINATAL CARE | Facility: OTHER | Age: 23
End: 2023-12-14

## 2023-12-14 ENCOUNTER — TELEPHONE (OUTPATIENT)
Dept: OBGYN CLINIC | Facility: CLINIC | Age: 23
End: 2023-12-14

## 2023-12-14 NOTE — PROGRESS NOTES
Medium alert BP this morning of 131/90. She was already called by her OB this morning so no further action needed at this time.  Sebastián KINCAID

## 2023-12-14 NOTE — TELEPHONE ENCOUNTER
Phone call to patient to see how she is doing. Patient says doing well. Yesterday had a headache but today feels good and no headache. BP today 131/90. She is taking the nifedepine XL 30mg. Patient will keep appt next week.   And call sooner if any concerns

## 2023-12-18 ENCOUNTER — DOCUMENTATION (OUTPATIENT)
Facility: HOSPITAL | Age: 23
End: 2023-12-18

## 2023-12-18 NOTE — PROGRESS NOTES
Voice message left for Angy to repeat BP and unable to leave voice message for emergency contact. Routing medium alert /96 to OBGYN [facility] clinical [place] for follow up.

## 2023-12-19 NOTE — PROGRESS NOTES
The patient is a 23 y.o. who presents for a postpartum visit. She is 2 week postpartum following a  delivery on 23. The delivery was at 35w4d gestational weeks due to PPROM.  Patient was diagnosed with pre eclampsia with severe features.  She declined hypertensive medication/ magnesium.  Patient was started on Nifedepine XL 30mg daily one week ago.  Patient says she is feeling a lot better.  She noticed a difference a few hours after she took the meds.   She is continuing to check her BP at home getting 115-130/ 80-85.      Postpartum Depression Scale: 7  Patient feels she is doing well.          SBIRT screen:  negative       ROS: negative     Current Outpatient Medications on File Prior to Visit   Medication Sig Dispense Refill    Prenatal Multivit-Min-Fe-FA (PRE-SYLVIE PO) Take by mouth      [DISCONTINUED] NIFEdipine (PROCARDIA XL) 30 mg 24 hr tablet Take 1 tablet (30 mg total) by mouth daily 30 tablet 0    acetaminophen (TYLENOL) 325 mg tablet Take 2 tablets (650 mg total) by mouth every 6 (six) hours (Patient not taking: Reported on 2023)  0    benzocaine-menthol-lanolin-aloe (DERMOPLAST) 20-0.5 % topical spray Apply 1 Application topically every 6 (six) hours as needed for irritation or mild pain (Patient not taking: Reported on 2023)  0    ibuprofen (MOTRIN) 600 mg tablet Take 1 tablet (600 mg total) by mouth every 6 (six) hours (Patient not taking: Reported on 2023) 30 tablet 0    witch hazel-glycerin (TUCKS) topical pad Apply 1 Pad topically every 4 (four) hours as needed for irritation (Patient not taking: Reported on 2023)  0     No current facility-administered medications on file prior to visit.         Vitals:    23 1026   BP: 134/84   Pulse: 92   Temp: 98.7 °F (37.1 °C)   SpO2: 98%         Assessment:  Postpartum exam    Plan:  Continue nifedipine XL 30mg  Continue BP monitoring program  Return to office 4 wks for 6 wk postpartum exam.

## 2023-12-19 NOTE — PROGRESS NOTES
Please call patient.   We got an alert that she had an elevated BP yesterday of 130-96.   Has patient checked BP since?  What have her BP been today?

## 2023-12-21 ENCOUNTER — POSTPARTUM VISIT (OUTPATIENT)
Dept: OBGYN CLINIC | Facility: CLINIC | Age: 23
End: 2023-12-21

## 2023-12-21 VITALS
OXYGEN SATURATION: 98 % | TEMPERATURE: 98.7 F | WEIGHT: 180.4 LBS | BODY MASS INDEX: 31.96 KG/M2 | SYSTOLIC BLOOD PRESSURE: 134 MMHG | DIASTOLIC BLOOD PRESSURE: 84 MMHG | HEIGHT: 63 IN | HEART RATE: 92 BPM

## 2023-12-21 RX ORDER — NIFEDIPINE 30 MG/1
30 TABLET, EXTENDED RELEASE ORAL DAILY
Qty: 30 TABLET | Refills: 0 | Status: SHIPPED | OUTPATIENT
Start: 2023-12-21

## 2023-12-26 ENCOUNTER — TELEPHONE (OUTPATIENT)
Facility: HOSPITAL | Age: 23
End: 2023-12-26

## 2023-12-26 NOTE — TELEPHONE ENCOUNTER
Angy in postpartum home BP monitoring program; has missed 4 consecutive BP checks; voice message left to restart BP checks; unable to leave message for emergency contact due to no voice mail available. Clearing out masimo medium BP alerts and routing readings to OB clinical pool.

## 2024-01-18 ENCOUNTER — POSTPARTUM VISIT (OUTPATIENT)
Dept: OBGYN CLINIC | Facility: CLINIC | Age: 24
End: 2024-01-18
Payer: COMMERCIAL

## 2024-01-18 VITALS
WEIGHT: 178 LBS | SYSTOLIC BLOOD PRESSURE: 128 MMHG | HEART RATE: 92 BPM | HEIGHT: 63 IN | BODY MASS INDEX: 31.54 KG/M2 | DIASTOLIC BLOOD PRESSURE: 70 MMHG

## 2024-01-18 PROBLEM — O09.293 IUGR (INTRAUTERINE GROWTH RESTRICTION) IN PRIOR PREGNANCY, PREGNANT, THIRD TRIMESTER: Status: RESOLVED | Noted: 2023-05-02 | Resolved: 2024-01-18

## 2024-01-18 PROBLEM — Z3A.35 35 WEEKS GESTATION OF PREGNANCY: Status: RESOLVED | Noted: 2023-06-01 | Resolved: 2024-01-18

## 2024-01-18 PROBLEM — O43.193 MARGINAL INSERTION OF UMBILICAL CORD AFFECTING MANAGEMENT OF MOTHER IN THIRD TRIMESTER: Status: RESOLVED | Noted: 2023-08-24 | Resolved: 2024-01-18

## 2024-01-18 PROBLEM — Z87.59 HISTORY OF GESTATIONAL HYPERTENSION: Status: RESOLVED | Noted: 2023-05-02 | Resolved: 2024-01-18

## 2024-01-18 PROBLEM — O42.90 AMNIOTIC FLUID LEAKING: Status: RESOLVED | Noted: 2023-12-05 | Resolved: 2024-01-18

## 2024-01-18 PROBLEM — Z34.80 SUPERVISION OF OTHER NORMAL PREGNANCY, ANTEPARTUM: Status: RESOLVED | Noted: 2023-05-02 | Resolved: 2024-01-18

## 2024-04-17 NOTE — PROGRESS NOTES
Lm for patient to return call   Quality 130: Documentation Of Current Medications In The Medical Record: Current Medications Documented Quality 431: Preventive Care And Screening: Unhealthy Alcohol Use - Screening: Patient not identified as an unhealthy alcohol user when screened for unhealthy alcohol use using a systematic screening method Detail Level: Detailed Quality 226: Preventive Care And Screening: Tobacco Use: Screening And Cessation Intervention: Patient screened for tobacco use and is an ex/non-smoker Quality 47: Advance Care Plan: Advance Care Planning discussed and documented; advance care plan or surrogate decision maker documented in the medical record.

## 2025-01-19 NOTE — TELEPHONE ENCOUNTER
Left message on machine for pt to call office re: patient had labs yesterday. Urine TP/ creatinine was ordered but looks like not done. Trying to find out why? Was patient unable to give lab sample? If needs new order please send to Dr. Finch Innocent as I am out of office on Wednesday.
Spoke to lab, urine was collected and is being run, still pending.
yes